# Patient Record
Sex: FEMALE | Race: WHITE | NOT HISPANIC OR LATINO
[De-identification: names, ages, dates, MRNs, and addresses within clinical notes are randomized per-mention and may not be internally consistent; named-entity substitution may affect disease eponyms.]

---

## 2017-06-20 ENCOUNTER — APPOINTMENT (OUTPATIENT)
Dept: NUCLEAR MEDICINE | Facility: CLINIC | Age: 67
End: 2017-06-20

## 2017-06-20 ENCOUNTER — APPOINTMENT (OUTPATIENT)
Dept: MRI IMAGING | Facility: CLINIC | Age: 67
End: 2017-06-20

## 2017-06-20 ENCOUNTER — OUTPATIENT (OUTPATIENT)
Dept: OUTPATIENT SERVICES | Facility: HOSPITAL | Age: 67
LOS: 1 days | End: 2017-06-20
Payer: MEDICARE

## 2017-06-20 DIAGNOSIS — Z00.8 ENCOUNTER FOR OTHER GENERAL EXAMINATION: ICD-10-CM

## 2017-06-20 PROCEDURE — 72195 MRI PELVIS W/O DYE: CPT | Mod: 26,52

## 2017-06-23 ENCOUNTER — APPOINTMENT (OUTPATIENT)
Dept: NUCLEAR MEDICINE | Facility: CLINIC | Age: 67
End: 2017-06-23

## 2017-06-23 ENCOUNTER — APPOINTMENT (OUTPATIENT)
Dept: MRI IMAGING | Facility: CLINIC | Age: 67
End: 2017-06-23

## 2017-06-23 ENCOUNTER — OUTPATIENT (OUTPATIENT)
Dept: OUTPATIENT SERVICES | Facility: HOSPITAL | Age: 67
LOS: 1 days | End: 2017-06-23

## 2017-06-23 DIAGNOSIS — Z00.8 ENCOUNTER FOR OTHER GENERAL EXAMINATION: ICD-10-CM

## 2017-06-28 ENCOUNTER — OUTPATIENT (OUTPATIENT)
Dept: OUTPATIENT SERVICES | Facility: HOSPITAL | Age: 67
LOS: 1 days | Discharge: HOME | End: 2017-06-28
Payer: MEDICARE

## 2017-06-28 DIAGNOSIS — C55 MALIGNANT NEOPLASM OF UTERUS, PART UNSPECIFIED: ICD-10-CM

## 2017-06-29 ENCOUNTER — APPOINTMENT (OUTPATIENT)
Dept: HEMATOLOGY ONCOLOGY | Facility: CLINIC | Age: 67
End: 2017-06-29

## 2017-06-29 VITALS
OXYGEN SATURATION: 94 % | TEMPERATURE: 99.1 F | BODY MASS INDEX: 29.24 KG/M2 | HEIGHT: 64.57 IN | RESPIRATION RATE: 16 BRPM | DIASTOLIC BLOOD PRESSURE: 90 MMHG | HEART RATE: 85 BPM | WEIGHT: 173.37 LBS | SYSTOLIC BLOOD PRESSURE: 160 MMHG

## 2017-06-29 DIAGNOSIS — F17.200 NICOTINE DEPENDENCE, UNSPECIFIED, UNCOMPLICATED: ICD-10-CM

## 2017-06-29 DIAGNOSIS — Z80.3 FAMILY HISTORY OF MALIGNANT NEOPLASM OF BREAST: ICD-10-CM

## 2017-06-29 DIAGNOSIS — Z78.9 OTHER SPECIFIED HEALTH STATUS: ICD-10-CM

## 2017-06-29 DIAGNOSIS — Z63.4 DISAPPEARANCE AND DEATH OF FAMILY MEMBER: ICD-10-CM

## 2017-06-29 DIAGNOSIS — Z80.1 FAMILY HISTORY OF MALIGNANT NEOPLASM OF TRACHEA, BRONCHUS AND LUNG: ICD-10-CM

## 2017-06-29 SDOH — SOCIAL STABILITY - SOCIAL INSECURITY: DISSAPEARANCE AND DEATH OF FAMILY MEMBER: Z63.4

## 2017-07-10 ENCOUNTER — APPOINTMENT (OUTPATIENT)
Dept: HEMATOLOGY ONCOLOGY | Facility: CLINIC | Age: 67
End: 2017-07-10

## 2017-07-10 ENCOUNTER — RESULT REVIEW (OUTPATIENT)
Age: 67
End: 2017-07-10

## 2017-07-10 VITALS
RESPIRATION RATE: 18 BRPM | SYSTOLIC BLOOD PRESSURE: 150 MMHG | WEIGHT: 170.4 LBS | OXYGEN SATURATION: 94 % | DIASTOLIC BLOOD PRESSURE: 80 MMHG | HEART RATE: 77 BPM | TEMPERATURE: 98.5 F | BODY MASS INDEX: 28.74 KG/M2

## 2017-07-10 LAB
BASOPHILS # BLD AUTO: 0.1 K/UL — SIGNIFICANT CHANGE UP (ref 0–0.2)
BASOPHILS NFR BLD AUTO: 1.2 % — SIGNIFICANT CHANGE UP (ref 0–2)
EOSINOPHIL # BLD AUTO: 0.4 K/UL — SIGNIFICANT CHANGE UP (ref 0–0.5)
EOSINOPHIL NFR BLD AUTO: 3.6 % — SIGNIFICANT CHANGE UP (ref 0–6)
HCT VFR BLD CALC: 43.4 % — SIGNIFICANT CHANGE UP (ref 34.5–45)
HGB BLD-MCNC: 15 G/DL — SIGNIFICANT CHANGE UP (ref 11.5–15.5)
LYMPHOCYTES # BLD AUTO: 19.6 % — SIGNIFICANT CHANGE UP (ref 13–44)
LYMPHOCYTES # BLD AUTO: 2.3 K/UL — SIGNIFICANT CHANGE UP (ref 1–3.3)
MCHC RBC-ENTMCNC: 30.3 PG — SIGNIFICANT CHANGE UP (ref 27–34)
MCHC RBC-ENTMCNC: 34.6 GM/DL — SIGNIFICANT CHANGE UP (ref 32–36)
MCV RBC AUTO: 87.4 FL — SIGNIFICANT CHANGE UP (ref 80–100)
MONOCYTES # BLD AUTO: 0.7 K/UL — SIGNIFICANT CHANGE UP (ref 0–0.9)
MONOCYTES NFR BLD AUTO: 6 % — SIGNIFICANT CHANGE UP (ref 2–14)
NEUTROPHILS # BLD AUTO: 8.2 K/UL — HIGH (ref 1.8–7.4)
NEUTROPHILS NFR BLD AUTO: 69.5 % — SIGNIFICANT CHANGE UP (ref 43–77)
PLATELET # BLD AUTO: 297 K/UL — SIGNIFICANT CHANGE UP (ref 150–400)
RBC # BLD: 4.96 M/UL — SIGNIFICANT CHANGE UP (ref 3.8–5.2)
RBC # FLD: 11.2 % — SIGNIFICANT CHANGE UP (ref 10.3–14.5)
WBC # BLD: 11.9 K/UL — HIGH (ref 3.8–10.5)
WBC # FLD AUTO: 11.9 K/UL — HIGH (ref 3.8–10.5)

## 2017-07-10 RX ORDER — DIAZEPAM 5 MG/1
TABLET ORAL
Refills: 0 | Status: DISCONTINUED | COMMUNITY

## 2017-07-11 ENCOUNTER — CLINICAL ADVICE (OUTPATIENT)
Age: 67
End: 2017-07-11

## 2017-07-11 ENCOUNTER — APPOINTMENT (OUTPATIENT)
Dept: INFUSION THERAPY | Facility: CLINIC | Age: 67
End: 2017-07-11

## 2017-07-11 LAB
ALBUMIN SERPL ELPH-MCNC: 4.9 G/DL
ALP BLD-CCNC: 68 U/L
ALT SERPL-CCNC: 16 U/L
ANION GAP SERPL CALC-SCNC: 21 MMOL/L
AST SERPL-CCNC: 19 U/L
BILIRUB SERPL-MCNC: 0.5 MG/DL
BUN SERPL-MCNC: 21 MG/DL
CALCIUM SERPL-MCNC: 9.6 MG/DL
CHLORIDE SERPL-SCNC: 102 MMOL/L
CO2 SERPL-SCNC: 21 MMOL/L
CREAT SERPL-MCNC: 0.97 MG/DL
GLUCOSE SERPL-MCNC: 114 MG/DL
HBV CORE IGM SER QL: NONREACTIVE
HBV SURFACE AB SER QL: NONREACTIVE
HBV SURFACE AB SERPL IA-ACNC: <3 MIU/ML
HBV SURFACE AG SER QL: NONREACTIVE
HCV AB SER QL: NONREACTIVE
HCV S/CO RATIO: 0.11 S/CO
MAGNESIUM SERPL-MCNC: 2.3 MG/DL
POTASSIUM SERPL-SCNC: 4.2 MMOL/L
PROT SERPL-MCNC: 7.7 G/DL
SODIUM SERPL-SCNC: 144 MMOL/L

## 2017-07-11 PROCEDURE — 93010 ELECTROCARDIOGRAM REPORT: CPT

## 2017-07-12 DIAGNOSIS — Z51.11 ENCOUNTER FOR ANTINEOPLASTIC CHEMOTHERAPY: ICD-10-CM

## 2017-07-12 DIAGNOSIS — C54.1 MALIGNANT NEOPLASM OF ENDOMETRIUM: ICD-10-CM

## 2017-07-12 DIAGNOSIS — R11.2 NAUSEA WITH VOMITING, UNSPECIFIED: ICD-10-CM

## 2017-07-17 ENCOUNTER — OTHER (OUTPATIENT)
Age: 67
End: 2017-07-17

## 2017-07-20 ENCOUNTER — RESULT REVIEW (OUTPATIENT)
Age: 67
End: 2017-07-20

## 2017-07-20 ENCOUNTER — APPOINTMENT (OUTPATIENT)
Dept: HEMATOLOGY ONCOLOGY | Facility: CLINIC | Age: 67
End: 2017-07-20

## 2017-07-20 VITALS
HEART RATE: 68 BPM | DIASTOLIC BLOOD PRESSURE: 75 MMHG | WEIGHT: 167.99 LBS | BODY MASS INDEX: 28.33 KG/M2 | RESPIRATION RATE: 16 BRPM | OXYGEN SATURATION: 98 % | SYSTOLIC BLOOD PRESSURE: 132 MMHG | TEMPERATURE: 98.5 F

## 2017-07-20 LAB
BASOPHILS # BLD AUTO: 0 K/UL — SIGNIFICANT CHANGE UP (ref 0–0.2)
BASOPHILS NFR BLD AUTO: 0.8 % — SIGNIFICANT CHANGE UP (ref 0–2)
EOSINOPHIL # BLD AUTO: 0.3 K/UL — SIGNIFICANT CHANGE UP (ref 0–0.5)
EOSINOPHIL NFR BLD AUTO: 5.1 % — SIGNIFICANT CHANGE UP (ref 0–6)
HCT VFR BLD CALC: 39.3 % — SIGNIFICANT CHANGE UP (ref 34.5–45)
HGB BLD-MCNC: 13.7 G/DL — SIGNIFICANT CHANGE UP (ref 11.5–15.5)
LYMPHOCYTES # BLD AUTO: 1.5 K/UL — SIGNIFICANT CHANGE UP (ref 1–3.3)
LYMPHOCYTES # BLD AUTO: 29 % — SIGNIFICANT CHANGE UP (ref 13–44)
MCHC RBC-ENTMCNC: 30.2 PG — SIGNIFICANT CHANGE UP (ref 27–34)
MCHC RBC-ENTMCNC: 34.9 GM/DL — SIGNIFICANT CHANGE UP (ref 32–36)
MCV RBC AUTO: 86.4 FL — SIGNIFICANT CHANGE UP (ref 80–100)
MONOCYTES # BLD AUTO: 0.4 K/UL — SIGNIFICANT CHANGE UP (ref 0–0.9)
MONOCYTES NFR BLD AUTO: 8 % — SIGNIFICANT CHANGE UP (ref 2–14)
NEUTROPHILS # BLD AUTO: 3 K/UL — SIGNIFICANT CHANGE UP (ref 1.8–7.4)
NEUTROPHILS NFR BLD AUTO: 57 % — SIGNIFICANT CHANGE UP (ref 43–77)
PLATELET # BLD AUTO: 246 K/UL — SIGNIFICANT CHANGE UP (ref 150–400)
RBC # BLD: 4.54 M/UL — SIGNIFICANT CHANGE UP (ref 3.8–5.2)
RBC # FLD: 10.7 % — SIGNIFICANT CHANGE UP (ref 10.3–14.5)
WBC # BLD: 5.3 K/UL — SIGNIFICANT CHANGE UP (ref 3.8–10.5)
WBC # FLD AUTO: 5.3 K/UL — SIGNIFICANT CHANGE UP (ref 3.8–10.5)

## 2017-07-20 RX ORDER — AZITHROMYCIN 250 MG/1
250 TABLET, FILM COATED ORAL
Qty: 6 | Refills: 0 | Status: DISCONTINUED | COMMUNITY
Start: 2017-05-20

## 2017-07-20 RX ORDER — ROSUVASTATIN CALCIUM 5 MG/1
TABLET, FILM COATED ORAL
Refills: 0 | Status: DISCONTINUED | COMMUNITY
End: 2017-07-20

## 2017-07-21 LAB
ALBUMIN SERPL ELPH-MCNC: 4.6 G/DL
ALP BLD-CCNC: 71 U/L
ALT SERPL-CCNC: 11 U/L
ANION GAP SERPL CALC-SCNC: 23 MMOL/L
AST SERPL-CCNC: 15 U/L
BILIRUB SERPL-MCNC: 0.5 MG/DL
BUN SERPL-MCNC: 36 MG/DL
CALCIUM SERPL-MCNC: 10.3 MG/DL
CHLORIDE SERPL-SCNC: 106 MMOL/L
CO2 SERPL-SCNC: 20 MMOL/L
CREAT SERPL-MCNC: 1.59 MG/DL
MAGNESIUM SERPL-MCNC: 2 MG/DL
POTASSIUM SERPL-SCNC: 4.2 MMOL/L
PROT SERPL-MCNC: 7.3 G/DL
SODIUM SERPL-SCNC: 149 MMOL/L

## 2017-07-28 ENCOUNTER — OUTPATIENT (OUTPATIENT)
Dept: OUTPATIENT SERVICES | Facility: HOSPITAL | Age: 67
LOS: 1 days | Discharge: HOME | End: 2017-07-28

## 2017-07-28 DIAGNOSIS — C55 MALIGNANT NEOPLASM OF UTERUS, PART UNSPECIFIED: ICD-10-CM

## 2017-08-01 ENCOUNTER — LABORATORY RESULT (OUTPATIENT)
Age: 67
End: 2017-08-01

## 2017-08-01 ENCOUNTER — RESULT REVIEW (OUTPATIENT)
Age: 67
End: 2017-08-01

## 2017-08-01 ENCOUNTER — APPOINTMENT (OUTPATIENT)
Dept: INFUSION THERAPY | Facility: CLINIC | Age: 67
End: 2017-08-01

## 2017-08-01 ENCOUNTER — APPOINTMENT (OUTPATIENT)
Dept: HEMATOLOGY ONCOLOGY | Facility: CLINIC | Age: 67
End: 2017-08-01
Payer: MEDICARE

## 2017-08-01 VITALS
DIASTOLIC BLOOD PRESSURE: 85 MMHG | TEMPERATURE: 98.5 F | SYSTOLIC BLOOD PRESSURE: 145 MMHG | BODY MASS INDEX: 27.96 KG/M2 | WEIGHT: 165.77 LBS | RESPIRATION RATE: 16 BRPM | OXYGEN SATURATION: 95 % | HEART RATE: 97 BPM

## 2017-08-01 LAB
BASOPHILS # BLD AUTO: 0.1 K/UL — SIGNIFICANT CHANGE UP (ref 0–0.2)
BASOPHILS NFR BLD AUTO: 0.4 % — SIGNIFICANT CHANGE UP (ref 0–2)
BUN SERPL-MCNC: 26 MG/DL — HIGH (ref 7–23)
CA-I BLDA-SCNC: 1.18 MMOL/L — SIGNIFICANT CHANGE UP (ref 1.12–1.3)
CHLORIDE SERPL-SCNC: 103 MMOL/L — SIGNIFICANT CHANGE UP (ref 96–108)
CO2 SERPL-SCNC: 25 MMOL/L — SIGNIFICANT CHANGE UP (ref 22–31)
CREAT SERPL-MCNC: 0.8 MG/DL — SIGNIFICANT CHANGE UP (ref 0.5–1.3)
EOSINOPHIL # BLD AUTO: 0 K/UL — SIGNIFICANT CHANGE UP (ref 0–0.5)
EOSINOPHIL NFR BLD AUTO: 0 % — SIGNIFICANT CHANGE UP (ref 0–6)
GLUCOSE SERPL-MCNC: 199 MG/DL — HIGH (ref 70–99)
HCT VFR BLD CALC: 40.5 % — SIGNIFICANT CHANGE UP (ref 34.5–45)
HGB BLD-MCNC: 14.9 G/DL — SIGNIFICANT CHANGE UP (ref 11.5–15.5)
LYMPHOCYTES # BLD AUTO: 1 K/UL — SIGNIFICANT CHANGE UP (ref 1–3.3)
LYMPHOCYTES # BLD AUTO: 6.1 % — LOW (ref 13–44)
MCHC RBC-ENTMCNC: 32.5 PG — SIGNIFICANT CHANGE UP (ref 27–34)
MCHC RBC-ENTMCNC: 36.8 GM/DL — HIGH (ref 32–36)
MCV RBC AUTO: 88.4 FL — SIGNIFICANT CHANGE UP (ref 80–100)
MONOCYTES # BLD AUTO: 0.2 K/UL — SIGNIFICANT CHANGE UP (ref 0–0.9)
MONOCYTES NFR BLD AUTO: 1.2 % — LOW (ref 2–14)
NEUTROPHILS # BLD AUTO: 15.2 K/UL — HIGH (ref 1.8–7.4)
NEUTROPHILS NFR BLD AUTO: 92.3 % — HIGH (ref 43–77)
PLATELET # BLD AUTO: 294 K/UL — SIGNIFICANT CHANGE UP (ref 150–400)
POTASSIUM SERPL-MCNC: 3.9 MMOL/L — SIGNIFICANT CHANGE UP (ref 3.5–5.3)
POTASSIUM SERPL-SCNC: 3.9 MMOL/L — SIGNIFICANT CHANGE UP (ref 3.5–5.3)
RBC # BLD: 4.58 M/UL — SIGNIFICANT CHANGE UP (ref 3.8–5.2)
RBC # FLD: 11.7 % — SIGNIFICANT CHANGE UP (ref 10.3–14.5)
SODIUM SERPL-SCNC: 141 MMOL/L — SIGNIFICANT CHANGE UP (ref 135–145)
WBC # BLD: 16.5 K/UL — HIGH (ref 3.8–10.5)
WBC # FLD AUTO: 16.5 K/UL — HIGH (ref 3.8–10.5)

## 2017-08-01 PROCEDURE — 99214 OFFICE O/P EST MOD 30 MIN: CPT

## 2017-08-02 DIAGNOSIS — C54.1 MALIGNANT NEOPLASM OF ENDOMETRIUM: ICD-10-CM

## 2017-08-02 DIAGNOSIS — R11.2 NAUSEA WITH VOMITING, UNSPECIFIED: ICD-10-CM

## 2017-08-02 DIAGNOSIS — Z51.11 ENCOUNTER FOR ANTINEOPLASTIC CHEMOTHERAPY: ICD-10-CM

## 2017-08-21 ENCOUNTER — OUTPATIENT (OUTPATIENT)
Dept: OUTPATIENT SERVICES | Facility: HOSPITAL | Age: 67
LOS: 1 days | End: 2017-08-21

## 2017-08-21 ENCOUNTER — APPOINTMENT (OUTPATIENT)
Dept: NUCLEAR MEDICINE | Facility: CLINIC | Age: 67
End: 2017-08-21
Payer: MEDICARE

## 2017-08-21 DIAGNOSIS — Z00.8 ENCOUNTER FOR OTHER GENERAL EXAMINATION: ICD-10-CM

## 2017-08-21 PROCEDURE — 78815 PET IMAGE W/CT SKULL-THIGH: CPT | Mod: 26,PS

## 2017-08-22 ENCOUNTER — APPOINTMENT (OUTPATIENT)
Dept: HEMATOLOGY ONCOLOGY | Facility: CLINIC | Age: 67
End: 2017-08-22
Payer: MEDICARE

## 2017-08-22 VITALS
HEART RATE: 97 BPM | DIASTOLIC BLOOD PRESSURE: 73 MMHG | WEIGHT: 163.14 LBS | BODY MASS INDEX: 27.51 KG/M2 | SYSTOLIC BLOOD PRESSURE: 122 MMHG | RESPIRATION RATE: 16 BRPM | TEMPERATURE: 99 F | OXYGEN SATURATION: 95 %

## 2017-08-22 PROCEDURE — 99214 OFFICE O/P EST MOD 30 MIN: CPT

## 2017-08-28 ENCOUNTER — OUTPATIENT (OUTPATIENT)
Dept: OUTPATIENT SERVICES | Facility: HOSPITAL | Age: 67
LOS: 1 days | Discharge: HOME | End: 2017-08-28

## 2017-08-28 DIAGNOSIS — C55 MALIGNANT NEOPLASM OF UTERUS, PART UNSPECIFIED: ICD-10-CM

## 2017-08-31 ENCOUNTER — RESULT REVIEW (OUTPATIENT)
Age: 67
End: 2017-08-31

## 2017-08-31 ENCOUNTER — LABORATORY RESULT (OUTPATIENT)
Age: 67
End: 2017-08-31

## 2017-08-31 ENCOUNTER — APPOINTMENT (OUTPATIENT)
Dept: INFUSION THERAPY | Facility: CLINIC | Age: 67
End: 2017-08-31

## 2017-08-31 LAB
ANISOCYTOSIS BLD QL: SLIGHT — SIGNIFICANT CHANGE UP
BASO STIPL BLD QL SMEAR: PRESENT — SIGNIFICANT CHANGE UP
BASOPHILS # BLD AUTO: 0 K/UL — SIGNIFICANT CHANGE UP (ref 0–0.2)
EOSINOPHIL # BLD AUTO: 0 K/UL — SIGNIFICANT CHANGE UP (ref 0–0.5)
HCT VFR BLD CALC: 33.5 % — LOW (ref 34.5–45)
HGB BLD-MCNC: 12.8 G/DL — SIGNIFICANT CHANGE UP (ref 11.5–15.5)
LYMPHOCYTES # BLD AUTO: 0.5 K/UL — LOW (ref 1–3.3)
LYMPHOCYTES # BLD AUTO: 9 % — LOW (ref 13–44)
MCHC RBC-ENTMCNC: 34.1 PG — HIGH (ref 27–34)
MCHC RBC-ENTMCNC: 38.2 GM/DL — HIGH (ref 32–36)
MCV RBC AUTO: 89.3 FL — SIGNIFICANT CHANGE UP (ref 80–100)
MONOCYTES # BLD AUTO: 0.1 K/UL — SIGNIFICANT CHANGE UP (ref 0–0.9)
MONOCYTES NFR BLD AUTO: 2 % — SIGNIFICANT CHANGE UP (ref 2–14)
NEUTROPHILS # BLD AUTO: 7.1 K/UL — SIGNIFICANT CHANGE UP (ref 1.8–7.4)
NEUTROPHILS NFR BLD AUTO: 89 % — HIGH (ref 43–77)
NEUTS HYPERSEG # BLD: PRESENT — SIGNIFICANT CHANGE UP
PLAT MORPH BLD: NORMAL — SIGNIFICANT CHANGE UP
PLATELET # BLD AUTO: 411 K/UL — HIGH (ref 150–400)
POLYCHROMASIA BLD QL SMEAR: SLIGHT — SIGNIFICANT CHANGE UP
RBC # BLD: 3.75 M/UL — LOW (ref 3.8–5.2)
RBC # FLD: 16.5 % — HIGH (ref 10.3–14.5)
RBC BLD AUTO: NORMAL — SIGNIFICANT CHANGE UP
SPHEROCYTES BLD QL SMEAR: SLIGHT — SIGNIFICANT CHANGE UP
WBC # BLD: 7.6 K/UL — SIGNIFICANT CHANGE UP (ref 3.8–10.5)
WBC # FLD AUTO: 7.6 K/UL — SIGNIFICANT CHANGE UP (ref 3.8–10.5)

## 2017-09-01 DIAGNOSIS — R11.2 NAUSEA WITH VOMITING, UNSPECIFIED: ICD-10-CM

## 2017-09-01 DIAGNOSIS — C54.1 MALIGNANT NEOPLASM OF ENDOMETRIUM: ICD-10-CM

## 2017-09-01 DIAGNOSIS — Z51.11 ENCOUNTER FOR ANTINEOPLASTIC CHEMOTHERAPY: ICD-10-CM

## 2017-09-05 ENCOUNTER — APPOINTMENT (OUTPATIENT)
Dept: HEMATOLOGY ONCOLOGY | Facility: CLINIC | Age: 67
End: 2017-09-05
Payer: MEDICARE

## 2017-09-05 VITALS
TEMPERATURE: 98.9 F | WEIGHT: 161.03 LBS | OXYGEN SATURATION: 97 % | RESPIRATION RATE: 14 BRPM | HEART RATE: 99 BPM | SYSTOLIC BLOOD PRESSURE: 134 MMHG | DIASTOLIC BLOOD PRESSURE: 81 MMHG | BODY MASS INDEX: 27.16 KG/M2

## 2017-09-05 PROCEDURE — 99214 OFFICE O/P EST MOD 30 MIN: CPT

## 2017-09-21 ENCOUNTER — APPOINTMENT (OUTPATIENT)
Dept: INFUSION THERAPY | Facility: CLINIC | Age: 67
End: 2017-09-21

## 2017-09-21 ENCOUNTER — APPOINTMENT (OUTPATIENT)
Dept: HEMATOLOGY ONCOLOGY | Facility: CLINIC | Age: 67
End: 2017-09-21
Payer: MEDICARE

## 2017-09-21 ENCOUNTER — LABORATORY RESULT (OUTPATIENT)
Age: 67
End: 2017-09-21

## 2017-09-21 ENCOUNTER — RESULT REVIEW (OUTPATIENT)
Age: 67
End: 2017-09-21

## 2017-09-21 LAB
BASOPHILS # BLD AUTO: 0 K/UL — SIGNIFICANT CHANGE UP (ref 0–0.2)
BASOPHILS NFR BLD AUTO: 0.4 % — SIGNIFICANT CHANGE UP (ref 0–2)
EOSINOPHIL # BLD AUTO: 0 K/UL — SIGNIFICANT CHANGE UP (ref 0–0.5)
EOSINOPHIL NFR BLD AUTO: 0.1 % — SIGNIFICANT CHANGE UP (ref 0–6)
HCT VFR BLD CALC: 24.8 % — LOW (ref 34.5–45)
HGB BLD-MCNC: 9.7 G/DL — LOW (ref 11.5–15.5)
LYMPHOCYTES # BLD AUTO: 0.5 K/UL — LOW (ref 1–3.3)
LYMPHOCYTES # BLD AUTO: 4.7 % — LOW (ref 13–44)
MCHC RBC-ENTMCNC: 35.8 PG — HIGH (ref 27–34)
MCHC RBC-ENTMCNC: 39.3 GM/DL — HIGH (ref 32–36)
MCV RBC AUTO: 91.1 FL — SIGNIFICANT CHANGE UP (ref 80–100)
MONOCYTES # BLD AUTO: 0.1 K/UL — SIGNIFICANT CHANGE UP (ref 0–0.9)
MONOCYTES NFR BLD AUTO: 1 % — LOW (ref 2–14)
NEUTROPHILS # BLD AUTO: 9.8 K/UL — HIGH (ref 1.8–7.4)
NEUTROPHILS NFR BLD AUTO: 93.8 % — HIGH (ref 43–77)
PLATELET # BLD AUTO: 181 K/UL — SIGNIFICANT CHANGE UP (ref 150–400)
RBC # BLD: 2.72 M/UL — LOW (ref 3.8–5.2)
RBC # FLD: 17.5 % — HIGH (ref 10.3–14.5)
WBC # BLD: 10.5 K/UL — SIGNIFICANT CHANGE UP (ref 3.8–10.5)
WBC # FLD AUTO: 10.5 K/UL — SIGNIFICANT CHANGE UP (ref 3.8–10.5)

## 2017-09-21 PROCEDURE — 99214 OFFICE O/P EST MOD 30 MIN: CPT

## 2017-10-09 ENCOUNTER — OUTPATIENT (OUTPATIENT)
Dept: OUTPATIENT SERVICES | Facility: HOSPITAL | Age: 67
LOS: 1 days | Discharge: HOME | End: 2017-10-09

## 2017-10-09 DIAGNOSIS — C55 MALIGNANT NEOPLASM OF UTERUS, PART UNSPECIFIED: ICD-10-CM

## 2017-10-12 ENCOUNTER — OUTPATIENT (OUTPATIENT)
Dept: OUTPATIENT SERVICES | Facility: HOSPITAL | Age: 67
LOS: 1 days | End: 2017-10-12
Payer: MEDICARE

## 2017-10-12 ENCOUNTER — LABORATORY RESULT (OUTPATIENT)
Age: 67
End: 2017-10-12

## 2017-10-12 ENCOUNTER — RESULT REVIEW (OUTPATIENT)
Age: 67
End: 2017-10-12

## 2017-10-12 ENCOUNTER — APPOINTMENT (OUTPATIENT)
Dept: HEMATOLOGY ONCOLOGY | Facility: CLINIC | Age: 67
End: 2017-10-12
Payer: MEDICARE

## 2017-10-12 ENCOUNTER — APPOINTMENT (OUTPATIENT)
Dept: INFUSION THERAPY | Facility: CLINIC | Age: 67
End: 2017-10-12

## 2017-10-12 DIAGNOSIS — D64.89 OTHER SPECIFIED ANEMIAS: ICD-10-CM

## 2017-10-12 LAB
ALLERGY+IMMUNOLOGY DIAG STUDY NOTE: SIGNIFICANT CHANGE UP
BASOPHILS # BLD AUTO: 0 K/UL — SIGNIFICANT CHANGE UP (ref 0–0.2)
BASOPHILS NFR BLD AUTO: 0.1 % — SIGNIFICANT CHANGE UP (ref 0–2)
BLD GP AB SCN SERPL QL: SIGNIFICANT CHANGE UP
DIR ANTIGLOB POLYSPECIFIC INTERPRETATION: SIGNIFICANT CHANGE UP
EOSINOPHIL # BLD AUTO: 0 K/UL — SIGNIFICANT CHANGE UP (ref 0–0.5)
EOSINOPHIL NFR BLD AUTO: 0.2 % — SIGNIFICANT CHANGE UP (ref 0–6)
HCT VFR BLD CALC: 21 % — CRITICAL LOW (ref 34.5–45)
HGB BLD-MCNC: 8.1 G/DL — LOW (ref 11.5–15.5)
LYMPHOCYTES # BLD AUTO: 0.6 K/UL — LOW (ref 1–3.3)
LYMPHOCYTES # BLD AUTO: 8.8 % — LOW (ref 13–44)
MCHC RBC-ENTMCNC: 37.2 PG — HIGH (ref 27–34)
MCHC RBC-ENTMCNC: 38.6 GM/DL — HIGH (ref 32–36)
MCV RBC AUTO: 96.5 FL — SIGNIFICANT CHANGE UP (ref 80–100)
MONOCYTES # BLD AUTO: 0.1 K/UL — SIGNIFICANT CHANGE UP (ref 0–0.9)
MONOCYTES NFR BLD AUTO: 1 % — LOW (ref 2–14)
NEUTROPHILS # BLD AUTO: 6.5 K/UL — SIGNIFICANT CHANGE UP (ref 1.8–7.4)
NEUTROPHILS NFR BLD AUTO: 89.9 % — HIGH (ref 43–77)
PLATELET # BLD AUTO: 121 K/UL — LOW (ref 150–400)
RBC # BLD: 2.18 M/UL — LOW (ref 3.8–5.2)
RBC # FLD: 19.2 % — HIGH (ref 10.3–14.5)
TYPE + AB SCN PNL BLD: SIGNIFICANT CHANGE UP
WBC # BLD: 7.2 K/UL — SIGNIFICANT CHANGE UP (ref 3.8–10.5)
WBC # FLD AUTO: 7.2 K/UL — SIGNIFICANT CHANGE UP (ref 3.8–10.5)

## 2017-10-12 PROCEDURE — 86900 BLOOD TYPING SEROLOGIC ABO: CPT

## 2017-10-12 PROCEDURE — 99214 OFFICE O/P EST MOD 30 MIN: CPT

## 2017-10-12 PROCEDURE — 86901 BLOOD TYPING SEROLOGIC RH(D): CPT

## 2017-10-12 PROCEDURE — 86850 RBC ANTIBODY SCREEN: CPT

## 2017-10-12 PROCEDURE — 86870 RBC ANTIBODY IDENTIFICATION: CPT

## 2017-10-12 PROCEDURE — 86077 PHYS BLOOD BANK SERV XMATCH: CPT

## 2017-10-12 PROCEDURE — 86922 COMPATIBILITY TEST ANTIGLOB: CPT

## 2017-10-12 PROCEDURE — 86880 COOMBS TEST DIRECT: CPT

## 2017-10-12 RX ORDER — ACETAMINOPHEN 500 MG
650 TABLET ORAL ONCE
Qty: 0 | Refills: 0 | Status: COMPLETED | OUTPATIENT
Start: 2017-10-13 | End: 2017-10-13

## 2017-10-13 ENCOUNTER — OUTPATIENT (OUTPATIENT)
Dept: INPATIENT UNIT | Facility: HOSPITAL | Age: 67
LOS: 1 days | End: 2017-10-13
Payer: MEDICARE

## 2017-10-13 VITALS
OXYGEN SATURATION: 99 % | HEART RATE: 78 BPM | TEMPERATURE: 99 F | SYSTOLIC BLOOD PRESSURE: 112 MMHG | DIASTOLIC BLOOD PRESSURE: 80 MMHG | RESPIRATION RATE: 18 BRPM

## 2017-10-13 VITALS — TEMPERATURE: 99 F

## 2017-10-13 DIAGNOSIS — D64.9 ANEMIA, UNSPECIFIED: ICD-10-CM

## 2017-10-13 DIAGNOSIS — Z51.11 ENCOUNTER FOR ANTINEOPLASTIC CHEMOTHERAPY: ICD-10-CM

## 2017-10-13 DIAGNOSIS — R11.2 NAUSEA WITH VOMITING, UNSPECIFIED: ICD-10-CM

## 2017-10-13 PROCEDURE — 36430 TRANSFUSION BLD/BLD COMPNT: CPT

## 2017-10-13 PROCEDURE — P9016: CPT

## 2017-10-13 RX ADMIN — Medication 650 MILLIGRAM(S): at 10:35

## 2017-10-19 ENCOUNTER — APPOINTMENT (OUTPATIENT)
Dept: INFUSION THERAPY | Facility: CLINIC | Age: 67
End: 2017-10-19

## 2017-11-02 ENCOUNTER — LABORATORY RESULT (OUTPATIENT)
Age: 67
End: 2017-11-02

## 2017-11-02 ENCOUNTER — APPOINTMENT (OUTPATIENT)
Dept: INFUSION THERAPY | Facility: CLINIC | Age: 67
End: 2017-11-02

## 2017-11-02 ENCOUNTER — RESULT REVIEW (OUTPATIENT)
Age: 67
End: 2017-11-02

## 2017-11-02 LAB
BASOPHILS # BLD AUTO: 0 K/UL — SIGNIFICANT CHANGE UP (ref 0–0.2)
BASOPHILS NFR BLD AUTO: 0.1 % — SIGNIFICANT CHANGE UP (ref 0–2)
EOSINOPHIL # BLD AUTO: 0 K/UL — SIGNIFICANT CHANGE UP (ref 0–0.5)
EOSINOPHIL NFR BLD AUTO: 0.3 % — SIGNIFICANT CHANGE UP (ref 0–6)
HCT VFR BLD CALC: 22.4 % — LOW (ref 34.5–45)
HGB BLD-MCNC: 8.5 G/DL — LOW (ref 11.5–15.5)
LYMPHOCYTES # BLD AUTO: 0.5 K/UL — LOW (ref 1–3.3)
LYMPHOCYTES # BLD AUTO: 6.4 % — LOW (ref 13–44)
MCHC RBC-ENTMCNC: 37.8 PG — HIGH (ref 27–34)
MCHC RBC-ENTMCNC: 38 GM/DL — HIGH (ref 32–36)
MCV RBC AUTO: 99.5 FL — SIGNIFICANT CHANGE UP (ref 80–100)
MONOCYTES # BLD AUTO: 0.1 K/UL — SIGNIFICANT CHANGE UP (ref 0–0.9)
MONOCYTES NFR BLD AUTO: 0.9 % — LOW (ref 2–14)
NEUTROPHILS # BLD AUTO: 7.6 K/UL — HIGH (ref 1.8–7.4)
NEUTROPHILS NFR BLD AUTO: 92.3 % — HIGH (ref 43–77)
PLATELET # BLD AUTO: 113 K/UL — LOW (ref 150–400)
RBC # BLD: 2.25 M/UL — LOW (ref 3.8–5.2)
RBC # FLD: 18 % — HIGH (ref 10.3–14.5)
WBC # BLD: 8.2 K/UL — SIGNIFICANT CHANGE UP (ref 3.8–10.5)
WBC # FLD AUTO: 8.2 K/UL — SIGNIFICANT CHANGE UP (ref 3.8–10.5)

## 2017-11-03 DIAGNOSIS — C54.1 MALIGNANT NEOPLASM OF ENDOMETRIUM: ICD-10-CM

## 2017-11-07 ENCOUNTER — RESULT REVIEW (OUTPATIENT)
Age: 67
End: 2017-11-07

## 2017-11-07 ENCOUNTER — OUTPATIENT (OUTPATIENT)
Dept: OUTPATIENT SERVICES | Facility: HOSPITAL | Age: 67
LOS: 1 days | End: 2017-11-07
Payer: MEDICARE

## 2017-11-07 ENCOUNTER — APPOINTMENT (OUTPATIENT)
Dept: HEMATOLOGY ONCOLOGY | Facility: CLINIC | Age: 67
End: 2017-11-07
Payer: MEDICARE

## 2017-11-07 VITALS
TEMPERATURE: 98.4 F | BODY MASS INDEX: 25.09 KG/M2 | HEART RATE: 98 BPM | OXYGEN SATURATION: 100 % | DIASTOLIC BLOOD PRESSURE: 58 MMHG | SYSTOLIC BLOOD PRESSURE: 101 MMHG | WEIGHT: 148.81 LBS

## 2017-11-07 DIAGNOSIS — D64.9 ANEMIA, UNSPECIFIED: ICD-10-CM

## 2017-11-07 LAB
BASOPHILS # BLD AUTO: 0 K/UL — SIGNIFICANT CHANGE UP (ref 0–0.2)
BASOPHILS NFR BLD AUTO: 0.5 % — SIGNIFICANT CHANGE UP (ref 0–2)
BLD GP AB SCN SERPL QL: SIGNIFICANT CHANGE UP
EOSINOPHIL # BLD AUTO: 0.1 K/UL — SIGNIFICANT CHANGE UP (ref 0–0.5)
EOSINOPHIL NFR BLD AUTO: 1.5 % — SIGNIFICANT CHANGE UP (ref 0–6)
HCT VFR BLD CALC: 20 % — CRITICAL LOW (ref 34.5–45)
HGB BLD-MCNC: 7.6 G/DL — LOW (ref 11.5–15.5)
LYMPHOCYTES # BLD AUTO: 1.5 K/UL — SIGNIFICANT CHANGE UP (ref 1–3.3)
LYMPHOCYTES # BLD AUTO: 34.1 % — SIGNIFICANT CHANGE UP (ref 13–44)
MCHC RBC-ENTMCNC: 37.1 PG — HIGH (ref 27–34)
MCHC RBC-ENTMCNC: 38.2 GM/DL — HIGH (ref 32–36)
MCV RBC AUTO: 97.2 FL — SIGNIFICANT CHANGE UP (ref 80–100)
MONOCYTES # BLD AUTO: 0.1 K/UL — SIGNIFICANT CHANGE UP (ref 0–0.9)
MONOCYTES NFR BLD AUTO: 1.5 % — LOW (ref 2–14)
NEUTROPHILS # BLD AUTO: 2.8 K/UL — SIGNIFICANT CHANGE UP (ref 1.8–7.4)
NEUTROPHILS NFR BLD AUTO: 62.4 % — SIGNIFICANT CHANGE UP (ref 43–77)
PLATELET # BLD AUTO: 74 K/UL — LOW (ref 150–400)
RBC # BLD: 2.05 M/UL — LOW (ref 3.8–5.2)
RBC # FLD: 15.2 % — HIGH (ref 10.3–14.5)
TYPE + AB SCN PNL BLD: SIGNIFICANT CHANGE UP
WBC # BLD: 4.5 K/UL — SIGNIFICANT CHANGE UP (ref 3.8–10.5)
WBC # FLD AUTO: 4.5 K/UL — SIGNIFICANT CHANGE UP (ref 3.8–10.5)

## 2017-11-07 PROCEDURE — 99214 OFFICE O/P EST MOD 30 MIN: CPT

## 2017-11-09 ENCOUNTER — OUTPATIENT (OUTPATIENT)
Dept: OUTPATIENT SERVICES | Facility: HOSPITAL | Age: 67
LOS: 1 days | End: 2017-11-09
Payer: MEDICARE

## 2017-11-09 VITALS
DIASTOLIC BLOOD PRESSURE: 67 MMHG | HEART RATE: 75 BPM | TEMPERATURE: 98 F | SYSTOLIC BLOOD PRESSURE: 118 MMHG | OXYGEN SATURATION: 93 % | RESPIRATION RATE: 20 BRPM

## 2017-11-09 VITALS
DIASTOLIC BLOOD PRESSURE: 77 MMHG | RESPIRATION RATE: 18 BRPM | HEART RATE: 72 BPM | TEMPERATURE: 99 F | OXYGEN SATURATION: 95 % | SYSTOLIC BLOOD PRESSURE: 118 MMHG

## 2017-11-09 DIAGNOSIS — D64.9 ANEMIA, UNSPECIFIED: ICD-10-CM

## 2017-11-09 PROCEDURE — 36415 COLL VENOUS BLD VENIPUNCTURE: CPT

## 2017-11-09 PROCEDURE — 86922 COMPATIBILITY TEST ANTIGLOB: CPT

## 2017-11-09 PROCEDURE — 86850 RBC ANTIBODY SCREEN: CPT

## 2017-11-09 PROCEDURE — 36430 TRANSFUSION BLD/BLD COMPNT: CPT

## 2017-11-09 PROCEDURE — 86900 BLOOD TYPING SEROLOGIC ABO: CPT

## 2017-11-09 PROCEDURE — 86901 BLOOD TYPING SEROLOGIC RH(D): CPT

## 2017-11-09 PROCEDURE — P9016: CPT

## 2017-11-09 RX ORDER — ACETAMINOPHEN 500 MG
650 TABLET ORAL ONCE
Qty: 0 | Refills: 0 | Status: COMPLETED | OUTPATIENT
Start: 2017-11-09 | End: 2017-11-09

## 2017-11-09 RX ADMIN — Medication 650 MILLIGRAM(S): at 11:39

## 2017-11-13 ENCOUNTER — FORM ENCOUNTER (OUTPATIENT)
Age: 67
End: 2017-11-13

## 2017-11-14 ENCOUNTER — APPOINTMENT (OUTPATIENT)
Dept: NUCLEAR MEDICINE | Facility: CLINIC | Age: 67
End: 2017-11-14
Payer: MEDICARE

## 2017-11-14 ENCOUNTER — OUTPATIENT (OUTPATIENT)
Dept: OUTPATIENT SERVICES | Facility: HOSPITAL | Age: 67
LOS: 1 days | End: 2017-11-14

## 2017-11-14 PROCEDURE — 78815 PET IMAGE W/CT SKULL-THIGH: CPT | Mod: 26,PS

## 2017-11-16 ENCOUNTER — OUTPATIENT (OUTPATIENT)
Dept: OUTPATIENT SERVICES | Facility: HOSPITAL | Age: 67
LOS: 1 days | Discharge: HOME | End: 2017-11-16

## 2017-11-16 DIAGNOSIS — C55 MALIGNANT NEOPLASM OF UTERUS, PART UNSPECIFIED: ICD-10-CM

## 2017-11-21 ENCOUNTER — RESULT REVIEW (OUTPATIENT)
Age: 67
End: 2017-11-21

## 2017-11-21 ENCOUNTER — APPOINTMENT (OUTPATIENT)
Dept: HEMATOLOGY ONCOLOGY | Facility: CLINIC | Age: 67
End: 2017-11-21
Payer: MEDICARE

## 2017-11-21 ENCOUNTER — APPOINTMENT (OUTPATIENT)
Dept: NUCLEAR MEDICINE | Facility: CLINIC | Age: 67
End: 2017-11-21

## 2017-11-21 VITALS
TEMPERATURE: 98.4 F | OXYGEN SATURATION: 97 % | WEIGHT: 155.21 LBS | HEART RATE: 92 BPM | DIASTOLIC BLOOD PRESSURE: 75 MMHG | SYSTOLIC BLOOD PRESSURE: 132 MMHG | BODY MASS INDEX: 26.17 KG/M2

## 2017-11-21 LAB
ANISOCYTOSIS BLD QL: SLIGHT — SIGNIFICANT CHANGE UP
BASO STIPL BLD QL SMEAR: PRESENT — SIGNIFICANT CHANGE UP
BASOPHILS # BLD AUTO: 0.2 K/UL — SIGNIFICANT CHANGE UP (ref 0–0.2)
BASOPHILS NFR BLD AUTO: 3.4 % — HIGH (ref 0–2)
EOSINOPHIL # BLD AUTO: 0 K/UL — SIGNIFICANT CHANGE UP (ref 0–0.5)
EOSINOPHIL NFR BLD AUTO: 0.8 % — SIGNIFICANT CHANGE UP (ref 0–6)
HCT VFR BLD CALC: 24.4 % — LOW (ref 34.5–45)
HGB BLD-MCNC: 8.8 G/DL — LOW (ref 11.5–15.5)
LYMPHOCYTES # BLD AUTO: 1.4 K/UL — SIGNIFICANT CHANGE UP (ref 1–3.3)
LYMPHOCYTES # BLD AUTO: 26.9 % — SIGNIFICANT CHANGE UP (ref 13–44)
MACROCYTES BLD QL: SLIGHT — SIGNIFICANT CHANGE UP
MCHC RBC-ENTMCNC: 33.7 PG — SIGNIFICANT CHANGE UP (ref 27–34)
MCHC RBC-ENTMCNC: 36 GM/DL — SIGNIFICANT CHANGE UP (ref 32–36)
MCV RBC AUTO: 93.7 FL — SIGNIFICANT CHANGE UP (ref 80–100)
MICROCYTES BLD QL: SLIGHT — SIGNIFICANT CHANGE UP
MONOCYTES # BLD AUTO: 0.6 K/UL — SIGNIFICANT CHANGE UP (ref 0–0.9)
MONOCYTES NFR BLD AUTO: 12 % — SIGNIFICANT CHANGE UP (ref 2–14)
NEUTROPHILS # BLD AUTO: 2.9 K/UL — SIGNIFICANT CHANGE UP (ref 1.8–7.4)
NEUTROPHILS NFR BLD AUTO: 56.9 % — SIGNIFICANT CHANGE UP (ref 43–77)
PLAT MORPH BLD: NORMAL — SIGNIFICANT CHANGE UP
PLATELET # BLD AUTO: 71 K/UL — LOW (ref 150–400)
POIKILOCYTOSIS BLD QL AUTO: SLIGHT — SIGNIFICANT CHANGE UP
POLYCHROMASIA BLD QL SMEAR: SLIGHT — SIGNIFICANT CHANGE UP
RBC # BLD: 2.61 M/UL — LOW (ref 3.8–5.2)
RBC # FLD: 17 % — HIGH (ref 10.3–14.5)
RBC BLD AUTO: ABNORMAL
SPHEROCYTES BLD QL SMEAR: SLIGHT — SIGNIFICANT CHANGE UP
WBC # BLD: 5.1 K/UL — SIGNIFICANT CHANGE UP (ref 3.8–10.5)
WBC # FLD AUTO: 5.1 K/UL — SIGNIFICANT CHANGE UP (ref 3.8–10.5)

## 2017-11-21 PROCEDURE — 99214 OFFICE O/P EST MOD 30 MIN: CPT

## 2017-11-22 ENCOUNTER — APPOINTMENT (OUTPATIENT)
Dept: INFUSION THERAPY | Facility: CLINIC | Age: 67
End: 2017-11-22

## 2017-12-08 ENCOUNTER — OUTPATIENT (OUTPATIENT)
Dept: OUTPATIENT SERVICES | Facility: HOSPITAL | Age: 67
LOS: 1 days | End: 2017-12-08
Payer: MEDICARE

## 2017-12-08 VITALS
RESPIRATION RATE: 16 BRPM | TEMPERATURE: 98 F | WEIGHT: 152.12 LBS | DIASTOLIC BLOOD PRESSURE: 80 MMHG | HEART RATE: 80 BPM | SYSTOLIC BLOOD PRESSURE: 140 MMHG | HEIGHT: 62 IN

## 2017-12-08 DIAGNOSIS — C54.1 MALIGNANT NEOPLASM OF ENDOMETRIUM: ICD-10-CM

## 2017-12-08 DIAGNOSIS — Z01.818 ENCOUNTER FOR OTHER PREPROCEDURAL EXAMINATION: ICD-10-CM

## 2017-12-08 LAB
ALBUMIN SERPL ELPH-MCNC: 4.6 G/DL — SIGNIFICANT CHANGE UP (ref 3.3–5.2)
ALP SERPL-CCNC: 63 U/L — SIGNIFICANT CHANGE UP (ref 40–120)
ALT FLD-CCNC: 9 U/L — SIGNIFICANT CHANGE UP
ANION GAP SERPL CALC-SCNC: 15 MMOL/L — SIGNIFICANT CHANGE UP (ref 5–17)
APTT BLD: 31.9 SEC — SIGNIFICANT CHANGE UP (ref 27.5–37.4)
AST SERPL-CCNC: 13 U/L — SIGNIFICANT CHANGE UP
BASOPHILS # BLD AUTO: 0 K/UL — SIGNIFICANT CHANGE UP (ref 0–0.2)
BASOPHILS NFR BLD AUTO: 0.3 % — SIGNIFICANT CHANGE UP (ref 0–2)
BILIRUB DIRECT SERPL-MCNC: 0.2 MG/DL — SIGNIFICANT CHANGE UP (ref 0–0.3)
BILIRUB INDIRECT FLD-MCNC: 0.7 MG/DL — SIGNIFICANT CHANGE UP (ref 0.2–1)
BILIRUB SERPL-MCNC: 0.9 MG/DL — SIGNIFICANT CHANGE UP (ref 0.4–2)
BUN SERPL-MCNC: 16 MG/DL — SIGNIFICANT CHANGE UP (ref 8–20)
CALCIUM SERPL-MCNC: 9.7 MG/DL — SIGNIFICANT CHANGE UP (ref 8.6–10.2)
CHLORIDE SERPL-SCNC: 101 MMOL/L — SIGNIFICANT CHANGE UP (ref 98–107)
CO2 SERPL-SCNC: 27 MMOL/L — SIGNIFICANT CHANGE UP (ref 22–29)
COMMENT - BLOOD BANK: SIGNIFICANT CHANGE UP
CREAT SERPL-MCNC: 0.65 MG/DL — SIGNIFICANT CHANGE UP (ref 0.5–1.3)
EOSINOPHIL # BLD AUTO: 0 K/UL — SIGNIFICANT CHANGE UP (ref 0–0.5)
EOSINOPHIL NFR BLD AUTO: 1.3 % — SIGNIFICANT CHANGE UP (ref 0–6)
GLUCOSE SERPL-MCNC: 102 MG/DL — SIGNIFICANT CHANGE UP (ref 70–115)
HBA1C BLD-MCNC: 4.3 % — SIGNIFICANT CHANGE UP (ref 4–5.6)
HCT VFR BLD CALC: 25.9 % — LOW (ref 37–47)
HGB BLD-MCNC: 8.8 G/DL — LOW (ref 12–16)
INR BLD: 1.03 RATIO — SIGNIFICANT CHANGE UP (ref 0.88–1.16)
LYMPHOCYTES # BLD AUTO: 1.1 K/UL — SIGNIFICANT CHANGE UP (ref 1–4.8)
LYMPHOCYTES # BLD AUTO: 29.2 % — SIGNIFICANT CHANGE UP (ref 20–55)
MCHC RBC-ENTMCNC: 34 G/DL — SIGNIFICANT CHANGE UP (ref 32–36)
MCHC RBC-ENTMCNC: 34.5 PG — HIGH (ref 27–31)
MCV RBC AUTO: 101.6 FL — HIGH (ref 81–99)
MONOCYTES # BLD AUTO: 0.3 K/UL — SIGNIFICANT CHANGE UP (ref 0–0.8)
MONOCYTES NFR BLD AUTO: 8.3 % — SIGNIFICANT CHANGE UP (ref 3–10)
NEUTROPHILS # BLD AUTO: 2.3 K/UL — SIGNIFICANT CHANGE UP (ref 1.8–8)
NEUTROPHILS NFR BLD AUTO: 60.6 % — SIGNIFICANT CHANGE UP (ref 37–73)
PLATELET # BLD AUTO: 162 K/UL — SIGNIFICANT CHANGE UP (ref 150–400)
POTASSIUM SERPL-MCNC: 3.2 MMOL/L — LOW (ref 3.5–5.3)
POTASSIUM SERPL-SCNC: 3.2 MMOL/L — LOW (ref 3.5–5.3)
PROT SERPL-MCNC: 7.4 G/DL — SIGNIFICANT CHANGE UP (ref 6.6–8.7)
PROTHROM AB SERPL-ACNC: 11.3 SEC — SIGNIFICANT CHANGE UP (ref 9.8–12.7)
RBC # BLD: 2.55 M/UL — LOW (ref 4.4–5.2)
RBC # FLD: 21.6 % — HIGH (ref 11–15.6)
SODIUM SERPL-SCNC: 143 MMOL/L — SIGNIFICANT CHANGE UP (ref 135–145)
TYPE + AB SCN PNL BLD: SIGNIFICANT CHANGE UP
WBC # BLD: 3.8 K/UL — LOW (ref 4.8–10.8)
WBC # FLD AUTO: 3.8 K/UL — LOW (ref 4.8–10.8)

## 2017-12-08 PROCEDURE — 71020: CPT | Mod: 26

## 2017-12-08 PROCEDURE — 85610 PROTHROMBIN TIME: CPT

## 2017-12-08 PROCEDURE — 86901 BLOOD TYPING SEROLOGIC RH(D): CPT

## 2017-12-08 PROCEDURE — 93010 ELECTROCARDIOGRAM REPORT: CPT

## 2017-12-08 PROCEDURE — 80076 HEPATIC FUNCTION PANEL: CPT

## 2017-12-08 PROCEDURE — G0463: CPT

## 2017-12-08 PROCEDURE — 85027 COMPLETE CBC AUTOMATED: CPT

## 2017-12-08 PROCEDURE — 36415 COLL VENOUS BLD VENIPUNCTURE: CPT

## 2017-12-08 PROCEDURE — 86900 BLOOD TYPING SEROLOGIC ABO: CPT

## 2017-12-08 PROCEDURE — 86304 IMMUNOASSAY TUMOR CA 125: CPT

## 2017-12-08 PROCEDURE — 85730 THROMBOPLASTIN TIME PARTIAL: CPT

## 2017-12-08 PROCEDURE — 71046 X-RAY EXAM CHEST 2 VIEWS: CPT

## 2017-12-08 PROCEDURE — 83036 HEMOGLOBIN GLYCOSYLATED A1C: CPT

## 2017-12-08 PROCEDURE — 80048 BASIC METABOLIC PNL TOTAL CA: CPT

## 2017-12-08 PROCEDURE — 86850 RBC ANTIBODY SCREEN: CPT

## 2017-12-08 PROCEDURE — 93005 ELECTROCARDIOGRAM TRACING: CPT

## 2017-12-08 RX ORDER — GENTAMICIN SULFATE 40 MG/ML
150 VIAL (ML) INJECTION ONCE
Qty: 0 | Refills: 0 | Status: DISCONTINUED | OUTPATIENT
Start: 2017-12-18 | End: 2017-12-21

## 2017-12-08 RX ORDER — SODIUM CHLORIDE 9 MG/ML
3 INJECTION INTRAMUSCULAR; INTRAVENOUS; SUBCUTANEOUS EVERY 8 HOURS
Qty: 0 | Refills: 0 | Status: DISCONTINUED | OUTPATIENT
Start: 2017-12-18 | End: 2017-12-21

## 2017-12-08 NOTE — H&P PST ADULT - HISTORY OF PRESENT ILLNESS
67 year old female with a history of dyslipidemia presents with endometrial cancer s/p chemo scheduled for a SHAWNA BSO pelvic para aortic lymphadenectomy omentectomy on 12/18/17.

## 2017-12-08 NOTE — H&P PST ADULT - PROBLEM SELECTOR PLAN 1
CBC, BMP, T&S, Ca125, HgbA1C, CXR, PT/PTT/INR hepatic profile pending.  Medical clearance scheduled for 12/14/17-will request a copy for chart. CBC, BMP, T&S, Ca125, HgbA1C, CXR, PT/PTT/INR, hepatic profile pending.  Medical clearance scheduled for 12/14/17-will request a copy for chart.

## 2017-12-08 NOTE — H&P PST ADULT - ASSESSMENT
67 year old female presents with endometrial cancer scheduled for a HSAWNA BSO pelvic para aortic lymphadenectomy omentectomy on 12/18/17. 67 year old female presents with endometrial cancer scheduled for a SHAWNA BSO pelvic para aortic lymphadenectomy and omentectomy on 12/18/17.

## 2017-12-10 LAB — CANCER AG125 SERPL-ACNC: 36 U/ML — HIGH

## 2017-12-14 ENCOUNTER — APPOINTMENT (OUTPATIENT)
Dept: INFUSION THERAPY | Facility: CLINIC | Age: 67
End: 2017-12-14

## 2017-12-18 ENCOUNTER — RESULT REVIEW (OUTPATIENT)
Age: 67
End: 2017-12-18

## 2017-12-18 ENCOUNTER — INPATIENT (INPATIENT)
Facility: HOSPITAL | Age: 67
LOS: 2 days | Discharge: HOME | DRG: 740 | End: 2017-12-21
Attending: OBSTETRICS & GYNECOLOGY | Admitting: OBSTETRICS & GYNECOLOGY
Payer: MEDICARE

## 2017-12-18 VITALS
HEIGHT: 62 IN | TEMPERATURE: 98 F | DIASTOLIC BLOOD PRESSURE: 62 MMHG | HEART RATE: 91 BPM | WEIGHT: 152.12 LBS | OXYGEN SATURATION: 100 % | SYSTOLIC BLOOD PRESSURE: 131 MMHG | RESPIRATION RATE: 18 BRPM

## 2017-12-18 DIAGNOSIS — C54.1 MALIGNANT NEOPLASM OF ENDOMETRIUM: ICD-10-CM

## 2017-12-18 LAB
GLUCOSE BLDC GLUCOMTR-MCNC: 121 MG/DL — HIGH (ref 70–99)
GLUCOSE BLDC GLUCOMTR-MCNC: 128 MG/DL — HIGH (ref 70–99)
GLUCOSE BLDC GLUCOMTR-MCNC: 151 MG/DL — HIGH (ref 70–99)
TYPE + AB SCN PNL BLD: SIGNIFICANT CHANGE UP

## 2017-12-18 PROCEDURE — 88341 IMHCHEM/IMCYTCHM EA ADD ANTB: CPT | Mod: 26

## 2017-12-18 PROCEDURE — 88309 TISSUE EXAM BY PATHOLOGIST: CPT | Mod: 26

## 2017-12-18 PROCEDURE — 88342 IMHCHEM/IMCYTCHM 1ST ANTB: CPT | Mod: 26

## 2017-12-18 PROCEDURE — 88307 TISSUE EXAM BY PATHOLOGIST: CPT | Mod: 26

## 2017-12-18 PROCEDURE — 88305 TISSUE EXAM BY PATHOLOGIST: CPT | Mod: 26

## 2017-12-18 RX ORDER — ONDANSETRON 8 MG/1
4 TABLET, FILM COATED ORAL ONCE
Qty: 0 | Refills: 0 | Status: DISCONTINUED | OUTPATIENT
Start: 2017-12-18 | End: 2017-12-18

## 2017-12-18 RX ORDER — ENOXAPARIN SODIUM 100 MG/ML
40 INJECTION SUBCUTANEOUS ONCE
Qty: 0 | Refills: 0 | Status: COMPLETED | OUTPATIENT
Start: 2017-12-18 | End: 2017-12-18

## 2017-12-18 RX ORDER — HYDROMORPHONE HYDROCHLORIDE 2 MG/ML
30 INJECTION INTRAMUSCULAR; INTRAVENOUS; SUBCUTANEOUS
Qty: 0 | Refills: 0 | Status: DISCONTINUED | OUTPATIENT
Start: 2017-12-18 | End: 2017-12-19

## 2017-12-18 RX ORDER — VANCOMYCIN HCL 1 G
1000 VIAL (EA) INTRAVENOUS ONCE
Qty: 0 | Refills: 0 | Status: COMPLETED | OUTPATIENT
Start: 2017-12-18 | End: 2017-12-18

## 2017-12-18 RX ORDER — ENOXAPARIN SODIUM 100 MG/ML
40 INJECTION SUBCUTANEOUS DAILY
Qty: 0 | Refills: 0 | Status: DISCONTINUED | OUTPATIENT
Start: 2017-12-19 | End: 2017-12-21

## 2017-12-18 RX ORDER — ENOXAPARIN SODIUM 100 MG/ML
40 INJECTION SUBCUTANEOUS
Qty: 28 | Refills: 0 | OUTPATIENT
Start: 2017-12-18 | End: 2018-01-14

## 2017-12-18 RX ORDER — SODIUM CHLORIDE 9 MG/ML
1000 INJECTION, SOLUTION INTRAVENOUS
Qty: 0 | Refills: 0 | Status: DISCONTINUED | OUTPATIENT
Start: 2017-12-18 | End: 2017-12-18

## 2017-12-18 RX ORDER — HYDROMORPHONE HYDROCHLORIDE 2 MG/ML
0.5 INJECTION INTRAMUSCULAR; INTRAVENOUS; SUBCUTANEOUS
Qty: 0 | Refills: 0 | Status: DISCONTINUED | OUTPATIENT
Start: 2017-12-18 | End: 2017-12-18

## 2017-12-18 RX ORDER — NALOXONE HYDROCHLORIDE 4 MG/.1ML
0.1 SPRAY NASAL
Qty: 0 | Refills: 0 | Status: DISCONTINUED | OUTPATIENT
Start: 2017-12-18 | End: 2017-12-21

## 2017-12-18 RX ORDER — VANCOMYCIN HCL 1 G
1000 VIAL (EA) INTRAVENOUS ONCE
Qty: 0 | Refills: 0 | Status: COMPLETED | OUTPATIENT
Start: 2017-12-19 | End: 2017-12-19

## 2017-12-18 RX ORDER — DEXTROSE MONOHYDRATE, SODIUM CHLORIDE, AND POTASSIUM CHLORIDE 50; .745; 4.5 G/1000ML; G/1000ML; G/1000ML
1000 INJECTION, SOLUTION INTRAVENOUS
Qty: 0 | Refills: 0 | Status: DISCONTINUED | OUTPATIENT
Start: 2017-12-18 | End: 2017-12-19

## 2017-12-18 RX ORDER — DOCUSATE SODIUM 100 MG
100 CAPSULE ORAL THREE TIMES A DAY
Qty: 0 | Refills: 0 | Status: DISCONTINUED | OUTPATIENT
Start: 2017-12-18 | End: 2017-12-21

## 2017-12-18 RX ORDER — SODIUM CHLORIDE 9 MG/ML
1000 INJECTION, SOLUTION INTRAVENOUS ONCE
Qty: 0 | Refills: 0 | Status: COMPLETED | OUTPATIENT
Start: 2017-12-18 | End: 2017-12-18

## 2017-12-18 RX ORDER — ATORVASTATIN CALCIUM 80 MG/1
40 TABLET, FILM COATED ORAL AT BEDTIME
Qty: 0 | Refills: 0 | Status: DISCONTINUED | OUTPATIENT
Start: 2017-12-18 | End: 2017-12-21

## 2017-12-18 RX ORDER — ONDANSETRON 8 MG/1
4 TABLET, FILM COATED ORAL EVERY 6 HOURS
Qty: 0 | Refills: 0 | Status: DISCONTINUED | OUTPATIENT
Start: 2017-12-18 | End: 2017-12-19

## 2017-12-18 RX ORDER — ONDANSETRON 8 MG/1
4 TABLET, FILM COATED ORAL EVERY 6 HOURS
Qty: 0 | Refills: 0 | Status: DISCONTINUED | OUTPATIENT
Start: 2017-12-18 | End: 2017-12-21

## 2017-12-18 RX ADMIN — Medication 100 MILLIGRAM(S): at 22:08

## 2017-12-18 RX ADMIN — HYDROMORPHONE HYDROCHLORIDE 30 MILLILITER(S): 2 INJECTION INTRAMUSCULAR; INTRAVENOUS; SUBCUTANEOUS at 21:03

## 2017-12-18 RX ADMIN — HYDROMORPHONE HYDROCHLORIDE 30 MILLILITER(S): 2 INJECTION INTRAMUSCULAR; INTRAVENOUS; SUBCUTANEOUS at 17:41

## 2017-12-18 RX ADMIN — Medication 250 MILLIGRAM(S): at 12:51

## 2017-12-18 RX ADMIN — ATORVASTATIN CALCIUM 40 MILLIGRAM(S): 80 TABLET, FILM COATED ORAL at 23:17

## 2017-12-18 RX ADMIN — SODIUM CHLORIDE 1000 MILLILITER(S): 9 INJECTION, SOLUTION INTRAVENOUS at 21:59

## 2017-12-18 RX ADMIN — ENOXAPARIN SODIUM 40 MILLIGRAM(S): 100 INJECTION SUBCUTANEOUS at 22:08

## 2017-12-18 NOTE — PROGRESS NOTE ADULT - SUBJECTIVE AND OBJECTIVE BOX
GYNECOLOGIC ONCOLOGY PROGRESS NOTE        PROBLEMS:  Endometrial cancer      Pt seen and examined at bedside.     SUBJECTIVE:    Patient is without complaints.  Pain well-controlled.  Denies Nausea, Vomiting or Diarrhea.  Denies shortness of breath, chest pain or dyspnea on exertion.      OBJECTIVE:     VITALS:  T(F): 97.9 (12-18-17 @ 16:50), Max: 97.9 (12-18-17 @ 16:50)  HR: 55 (12-18-17 @ 18:45) (55 - 91)  BP: 103/49 (12-18-17 @ 18:45) (97/49 - 131/62)  RR: 15 (12-18-17 @ 18:45) (13 - 19)  SpO2: 100% (12-18-17 @ 18:45) (95% - 100%)  Wt(kg): --    I&O's Summary  silveira with 90 ml over 2 hrs      MEDICATIONS  (STANDING):  atorvastatin 40 milliGRAM(s) Oral at bedtime  dextrose 5% + sodium chloride 0.45% with potassium chloride 20 mEq/L 1000 milliLiter(s) (125 mL/Hr) IV Continuous <Continuous>  docusate sodium 100 milliGRAM(s) Oral three times a day  enoxaparin Injectable 40 milliGRAM(s) SubCutaneous once  gentamicin   IVPB 150 milliGRAM(s) IV Intermittent once  HYDROmorphone PCA (1 mG/mL) 30 milliLiter(s) PCA Continuous PCA Continuous  lactated ringers. 1000 milliLiter(s) (100 mL/Hr) IV Continuous <Continuous>  naproxen 500 milliGRAM(s) Oral every 12 hours    MEDICATIONS  (PRN):  HYDROmorphone  Injectable 0.5 milliGRAM(s) IV Push every 10 minutes PRN Moderate Pain (4 - 6)  naloxone Injectable 0.1 milliGRAM(s) IV Push every 3 minutes PRN For ANY of the following changes in patient status:  A. RR LESS THAN 10 breaths per minute, B. Oxygen saturation LESS THAN 90%, C. Sedation score of 6  ondansetron Injectable 4 milliGRAM(s) IV Push once PRN Nausea and/or Vomiting  ondansetron Injectable 4 milliGRAM(s) IV Push every 6 hours PRN Nausea  ondansetron Injectable 4 milliGRAM(s) IV Push every 6 hours PRN Postoperative Nausea and/or Vomiting      Physical Exam:  Constitutional: NAD  Pulmonary: clear to auscultation bilaterally   Cardiovascular: Regular rate and rhythm on auscultation   Abdomen: soft, non-tender, non-distended, no bowel sounds.   Extremities: no lower extremity edema or calf tenderness.  Incision: with Provena in place. Provena clean, dry, intact.

## 2017-12-18 NOTE — PROGRESS NOTE ADULT - ASSESSMENT
67 year old s/p SHAWNA BSO PPALND omentectomy, sigmoid resection and anastomosis today. Recovering well.

## 2017-12-18 NOTE — PROGRESS NOTE ADULT - PROBLEM SELECTOR PLAN 1
1. Regular diet  2. Colace TID  3. OOB as tolerated in AM  4. Pain control with PCA  5. DVT prophylaxis with lovenox  6. BLANCA silveira in AM  7. Incentive Spirometer    Further plan per Dr. Jaimes

## 2017-12-19 DIAGNOSIS — D64.9 ANEMIA, UNSPECIFIED: ICD-10-CM

## 2017-12-19 LAB
ALBUMIN SERPL ELPH-MCNC: 3.5 G/DL — SIGNIFICANT CHANGE UP (ref 3.3–5.2)
ALP SERPL-CCNC: 43 U/L — SIGNIFICANT CHANGE UP (ref 40–120)
ALT FLD-CCNC: 7 U/L — SIGNIFICANT CHANGE UP
ANION GAP SERPL CALC-SCNC: 13 MMOL/L — SIGNIFICANT CHANGE UP (ref 5–17)
AST SERPL-CCNC: 12 U/L — SIGNIFICANT CHANGE UP
BILIRUB SERPL-MCNC: 0.9 MG/DL — SIGNIFICANT CHANGE UP (ref 0.4–2)
BUN SERPL-MCNC: 17 MG/DL — SIGNIFICANT CHANGE UP (ref 8–20)
CALCIUM SERPL-MCNC: 8 MG/DL — LOW (ref 8.6–10.2)
CHLORIDE SERPL-SCNC: 100 MMOL/L — SIGNIFICANT CHANGE UP (ref 98–107)
CO2 SERPL-SCNC: 24 MMOL/L — SIGNIFICANT CHANGE UP (ref 22–29)
CREAT SERPL-MCNC: 0.87 MG/DL — SIGNIFICANT CHANGE UP (ref 0.5–1.3)
GLUCOSE SERPL-MCNC: 171 MG/DL — HIGH (ref 70–115)
HCT VFR BLD CALC: 22.2 % — LOW (ref 37–47)
HCT VFR BLD CALC: 27.4 % — LOW (ref 37–47)
HGB BLD-MCNC: 7.4 G/DL — LOW (ref 12–16)
HGB BLD-MCNC: 9.2 G/DL — LOW (ref 12–16)
LACTATE SERPL-SCNC: 2 MMOL/L — SIGNIFICANT CHANGE UP (ref 0.5–2)
MAGNESIUM SERPL-MCNC: 1.6 MG/DL — SIGNIFICANT CHANGE UP (ref 1.6–2.6)
MCHC RBC-ENTMCNC: 33.3 G/DL — SIGNIFICANT CHANGE UP (ref 32–36)
MCHC RBC-ENTMCNC: 33.3 PG — HIGH (ref 27–31)
MCHC RBC-ENTMCNC: 33.6 G/DL — SIGNIFICANT CHANGE UP (ref 32–36)
MCHC RBC-ENTMCNC: 34.9 PG — HIGH (ref 27–31)
MCV RBC AUTO: 104.7 FL — HIGH (ref 81–99)
MCV RBC AUTO: 99.3 FL — HIGH (ref 81–99)
PHOSPHATE SERPL-MCNC: 3.8 MG/DL — SIGNIFICANT CHANGE UP (ref 2.4–4.7)
PLATELET # BLD AUTO: 159 K/UL — SIGNIFICANT CHANGE UP (ref 150–400)
PLATELET # BLD AUTO: 163 K/UL — SIGNIFICANT CHANGE UP (ref 150–400)
POTASSIUM SERPL-MCNC: 4.3 MMOL/L — SIGNIFICANT CHANGE UP (ref 3.5–5.3)
POTASSIUM SERPL-SCNC: 4.3 MMOL/L — SIGNIFICANT CHANGE UP (ref 3.5–5.3)
PROT SERPL-MCNC: 5.6 G/DL — LOW (ref 6.6–8.7)
RBC # BLD: 2.12 M/UL — LOW (ref 4.4–5.2)
RBC # BLD: 2.76 M/UL — LOW (ref 4.4–5.2)
RBC # FLD: 21.3 % — HIGH (ref 11–15.6)
RBC # FLD: 23.4 % — HIGH (ref 11–15.6)
SODIUM SERPL-SCNC: 137 MMOL/L — SIGNIFICANT CHANGE UP (ref 135–145)
WBC # BLD: 6.2 K/UL — SIGNIFICANT CHANGE UP (ref 4.8–10.8)
WBC # BLD: 6.3 K/UL — SIGNIFICANT CHANGE UP (ref 4.8–10.8)
WBC # FLD AUTO: 6.2 K/UL — SIGNIFICANT CHANGE UP (ref 4.8–10.8)
WBC # FLD AUTO: 6.3 K/UL — SIGNIFICANT CHANGE UP (ref 4.8–10.8)

## 2017-12-19 RX ORDER — ACETAMINOPHEN 500 MG
1000 TABLET ORAL ONCE
Qty: 0 | Refills: 0 | Status: COMPLETED | OUTPATIENT
Start: 2017-12-19 | End: 2017-12-19

## 2017-12-19 RX ORDER — FAMOTIDINE 10 MG/ML
20 INJECTION INTRAVENOUS ONCE
Qty: 0 | Refills: 0 | Status: COMPLETED | OUTPATIENT
Start: 2017-12-19 | End: 2017-12-19

## 2017-12-19 RX ORDER — SODIUM CHLORIDE 9 MG/ML
1000 INJECTION INTRAMUSCULAR; INTRAVENOUS; SUBCUTANEOUS ONCE
Qty: 0 | Refills: 0 | Status: COMPLETED | OUTPATIENT
Start: 2017-12-19 | End: 2017-12-19

## 2017-12-19 RX ORDER — OXYCODONE AND ACETAMINOPHEN 5; 325 MG/1; MG/1
1 TABLET ORAL EVERY 4 HOURS
Qty: 0 | Refills: 0 | Status: DISCONTINUED | OUTPATIENT
Start: 2017-12-19 | End: 2017-12-21

## 2017-12-19 RX ORDER — OXYCODONE AND ACETAMINOPHEN 5; 325 MG/1; MG/1
2 TABLET ORAL EVERY 4 HOURS
Qty: 0 | Refills: 0 | Status: DISCONTINUED | OUTPATIENT
Start: 2017-12-19 | End: 2017-12-21

## 2017-12-19 RX ORDER — DEXTROSE MONOHYDRATE, SODIUM CHLORIDE, AND POTASSIUM CHLORIDE 50; .745; 4.5 G/1000ML; G/1000ML; G/1000ML
1000 INJECTION, SOLUTION INTRAVENOUS
Qty: 0 | Refills: 0 | Status: DISCONTINUED | OUTPATIENT
Start: 2017-12-19 | End: 2017-12-20

## 2017-12-19 RX ADMIN — Medication 100 MILLIGRAM(S): at 05:32

## 2017-12-19 RX ADMIN — ATORVASTATIN CALCIUM 40 MILLIGRAM(S): 80 TABLET, FILM COATED ORAL at 21:48

## 2017-12-19 RX ADMIN — Medication 400 MILLIGRAM(S): at 23:20

## 2017-12-19 RX ADMIN — OXYCODONE AND ACETAMINOPHEN 1 TABLET(S): 5; 325 TABLET ORAL at 18:00

## 2017-12-19 RX ADMIN — Medication 500 MILLIGRAM(S): at 05:32

## 2017-12-19 RX ADMIN — DEXTROSE MONOHYDRATE, SODIUM CHLORIDE, AND POTASSIUM CHLORIDE 125 MILLILITER(S): 50; .745; 4.5 INJECTION, SOLUTION INTRAVENOUS at 13:10

## 2017-12-19 RX ADMIN — SODIUM CHLORIDE 3 MILLILITER(S): 9 INJECTION INTRAMUSCULAR; INTRAVENOUS; SUBCUTANEOUS at 21:14

## 2017-12-19 RX ADMIN — DEXTROSE MONOHYDRATE, SODIUM CHLORIDE, AND POTASSIUM CHLORIDE 60 MILLILITER(S): 50; .745; 4.5 INJECTION, SOLUTION INTRAVENOUS at 21:48

## 2017-12-19 RX ADMIN — SODIUM CHLORIDE 500 MILLILITER(S): 9 INJECTION INTRAMUSCULAR; INTRAVENOUS; SUBCUTANEOUS at 21:47

## 2017-12-19 RX ADMIN — Medication 100 MILLIGRAM(S): at 13:13

## 2017-12-19 RX ADMIN — OXYCODONE AND ACETAMINOPHEN 1 TABLET(S): 5; 325 TABLET ORAL at 17:28

## 2017-12-19 RX ADMIN — Medication 100 MILLIGRAM(S): at 21:48

## 2017-12-19 RX ADMIN — Medication 500 MILLIGRAM(S): at 06:18

## 2017-12-19 RX ADMIN — Medication 1000 MILLIGRAM(S): at 23:40

## 2017-12-19 RX ADMIN — Medication 250 MILLIGRAM(S): at 04:25

## 2017-12-19 RX ADMIN — Medication 400 MILLIGRAM(S): at 14:15

## 2017-12-19 RX ADMIN — FAMOTIDINE 20 MILLIGRAM(S): 10 INJECTION INTRAVENOUS at 13:13

## 2017-12-19 RX ADMIN — SODIUM CHLORIDE 3 MILLILITER(S): 9 INJECTION INTRAMUSCULAR; INTRAVENOUS; SUBCUTANEOUS at 14:16

## 2017-12-19 RX ADMIN — OXYCODONE AND ACETAMINOPHEN 2 TABLET(S): 5; 325 TABLET ORAL at 11:03

## 2017-12-19 RX ADMIN — SODIUM CHLORIDE 3 MILLILITER(S): 9 INJECTION INTRAMUSCULAR; INTRAVENOUS; SUBCUTANEOUS at 05:32

## 2017-12-19 RX ADMIN — Medication 1000 MILLIGRAM(S): at 15:00

## 2017-12-19 RX ADMIN — HYDROMORPHONE HYDROCHLORIDE 30 MILLILITER(S): 2 INJECTION INTRAMUSCULAR; INTRAVENOUS; SUBCUTANEOUS at 07:33

## 2017-12-19 RX ADMIN — DEXTROSE MONOHYDRATE, SODIUM CHLORIDE, AND POTASSIUM CHLORIDE 125 MILLILITER(S): 50; .745; 4.5 INJECTION, SOLUTION INTRAVENOUS at 04:26

## 2017-12-19 RX ADMIN — OXYCODONE AND ACETAMINOPHEN 2 TABLET(S): 5; 325 TABLET ORAL at 11:35

## 2017-12-19 NOTE — PROGRESS NOTE ADULT - SUBJECTIVE AND OBJECTIVE BOX
pt pod 1 sp TAHBSO, omentectomy under GETA. Tolerated well. Denies any A/c. pt with low bp throughout night. team aware. fluids given as well as 1 u PRBC.   pt with no n/v @ this time. was using pain meds/PCA as ordered/needed with some pain relief. vss. spont vent. no issues with anesthesia at this time. pt resting comfortably @ this time.

## 2017-12-19 NOTE — PROGRESS NOTE ADULT - SUBJECTIVE AND OBJECTIVE BOX
Patient seen and examined this afternoon with Dr. Jaimes, recovering well.   BP trend noted, likely secondary to anesthesia/pain medication. S/p 2 units  PRBC today, post transfusion CBC pending. Patient is tolerating regular food  and voiding, OOB/ambulation encouraged. Will follow up PM CBC and continue   to monitor BP.

## 2017-12-19 NOTE — PROGRESS NOTE ADULT - ASSESSMENT
67 year old s/p SHAWNA BSO PPALND omentectomy, sigmoid resection and anastomosis today. Recovering well. 68 yo POD#1 s/p SHAWNA BSO PPALND omentectomy, sigmoid resection and anastomosis today. Recovering well.

## 2017-12-19 NOTE — PROGRESS NOTE ADULT - PROBLEM SELECTOR PLAN 2
currently receiving 1U PRBC  transfuse additional 1U PRBC  F/U post PRBC transfusion CBC currently receiving 1U PRBC  transfuse additional 1U PRBC for total of 2 units  F/U post PRBC transfusion CBC

## 2017-12-19 NOTE — CHART NOTE - NSCHARTNOTEFT_GEN_A_CORE
Patient with small drop in H/H on post op labs. Still asymptomatic. Has a sensation of thirst. No new complaints of pain. Blood consent obtained and order for 1U PRBC placed.  Will follow up post transfusion CBC.

## 2017-12-19 NOTE — PROGRESS NOTE ADULT - SUBJECTIVE AND OBJECTIVE BOX
GYNECOLOGIC ONCOLOGY PROGRESS NOTE    POD#1    PROBLEMS:  Endometrial cancer      Pt seen and examined at bedside, resting comfortably. Patient with asymptomatic hypotension overnight, noted with systolic BP 80-88 throughout the night. Patient s/p 1L LR bolus, currently receiving 1U PRBC.    SUBJECTIVE:    Patient is without complaints.  Pain well-controlled.  Flatus: No  Denies Nausea, Vomiting or Diarrhea.  Denies shortness of breath, chest pain or dyspnea on exertion.  Tolerating diet.    OBJECTIVE:     VITALS:  T(F): 98.6 (12-19-17 @ 05:40), Max: 98.6 (12-19-17 @ 04:49)  HR: 74 (12-19-17 @ 05:40) (55 - 91)  BP: 85/57 (12-19-17 @ 05:40) (80/42 - 131/62)  RR: 18 (12-19-17 @ 05:40) (12 - 19)  SpO2: 92% (12-19-17 @ 05:40) (92% - 100%)      I&O's Summary    Arce with 500mL output overnight       MEDICATIONS  (STANDING):  atorvastatin 40 milliGRAM(s) Oral at bedtime  dextrose 5% + sodium chloride 0.45% with potassium chloride 20 mEq/L 1000 milliLiter(s) (125 mL/Hr) IV Continuous <Continuous>  docusate sodium 100 milliGRAM(s) Oral three times a day  enoxaparin Injectable 40 milliGRAM(s) SubCutaneous daily  gentamicin   IVPB 150 milliGRAM(s) IV Intermittent once  naproxen 500 milliGRAM(s) Oral every 12 hours  sodium chloride 0.9% lock flush 3 milliLiter(s) IV Push every 8 hours    MEDICATIONS  (PRN):  naloxone Injectable 0.1 milliGRAM(s) IV Push every 3 minutes PRN For ANY of the following changes in patient status:  A. RR LESS THAN 10 breaths per minute, B. Oxygen saturation LESS THAN 90%, C. Sedation score of 6  ondansetron Injectable 4 milliGRAM(s) IV Push every 6 hours PRN Nausea  oxyCODONE    5 mG/acetaminophen 325 mG 1 Tablet(s) Oral every 4 hours PRN Moderate Pain (4 - 6)  oxyCODONE    5 mG/acetaminophen 325 mG 2 Tablet(s) Oral every 4 hours PRN Severe Pain (7 - 10)      Physical Exam:  Constitutional: NAD  Pulmonary: clear to auscultation bilaterally   Cardiovascular: Regular rate and rhythm   Abdomen: soft, non-tender, non-distended, normal bowel sounds  Extremities: no lower extremity edema or calve tenderness, Qasim's sign negative.  Incision: Clean, dry, prevena intact.  Without signs of infection or hernia.      LABS:                          7.4    6.2   )-----------( 159      ( 19 Dec 2017 03:35 )             22.2     12-19    137  |  100  |  17.0  ----------------------------<  171<H>  4.3   |  24.0  |  0.87    Ca    8.0<L>      19 Dec 2017 03:35  Phos  3.8     12-19  Mg     1.6     12-19    TPro  5.6<L>  /  Alb  3.5  /  TBili  0.9  /  DBili  x   /  AST  12  /  ALT  7   /  AlkPhos  43  12-19          Patient seen and examined with Dr. Hawkins. GYNECOLOGIC ONCOLOGY PROGRESS NOTE    POD#1    PROBLEMS:  Endometrial cancer      Pt seen and examined at bedside, resting comfortably. Patient with asymptomatic hypotension overnight, noted with systolic BP 80-88 throughout the night. Patient s/p 1L LR bolus, currently receiving 1U PRBC.    SUBJECTIVE:    Patient is without complaints.  Pain well-controlled.  Flatus: No  Denies Nausea, Vomiting or Diarrhea.  Denies shortness of breath, chest pain or dyspnea on exertion.  Tolerating diet.    OBJECTIVE:     VITALS:  T(F): 98.6 (12-19-17 @ 05:40), Max: 98.6 (12-19-17 @ 04:49)  HR: 74 (12-19-17 @ 05:40) (55 - 91)  BP: 85/57 (12-19-17 @ 05:40) (80/42 - 131/62)  RR: 18 (12-19-17 @ 05:40) (12 - 19)  SpO2: 92% (12-19-17 @ 05:40) (92% - 100%)    I&O's Summary    Arce with 500mL output overnight       MEDICATIONS  (STANDING):  atorvastatin 40 milliGRAM(s) Oral at bedtime  dextrose 5% + sodium chloride 0.45% with potassium chloride 20 mEq/L 1000 milliLiter(s) (125 mL/Hr) IV Continuous <Continuous>  docusate sodium 100 milliGRAM(s) Oral three times a day  enoxaparin Injectable 40 milliGRAM(s) SubCutaneous daily  gentamicin   IVPB 150 milliGRAM(s) IV Intermittent once  naproxen 500 milliGRAM(s) Oral every 12 hours  sodium chloride 0.9% lock flush 3 milliLiter(s) IV Push every 8 hours    MEDICATIONS  (PRN):  naloxone Injectable 0.1 milliGRAM(s) IV Push every 3 minutes PRN For ANY of the following changes in patient status:  A. RR LESS THAN 10 breaths per minute, B. Oxygen saturation LESS THAN 90%, C. Sedation score of 6  ondansetron Injectable 4 milliGRAM(s) IV Push every 6 hours PRN Nausea  oxyCODONE    5 mG/acetaminophen 325 mG 1 Tablet(s) Oral every 4 hours PRN Moderate Pain (4 - 6)  oxyCODONE    5 mG/acetaminophen 325 mG 2 Tablet(s) Oral every 4 hours PRN Severe Pain (7 - 10)      Physical Exam:  Constitutional: NAD  Pulmonary: clear to auscultation bilaterally   Cardiovascular: Regular rate and rhythm   Abdomen: soft, non-tender, non-distended, normal bowel sounds  Extremities: no lower extremity edema or calve tenderness, Qasim's sign negative.  Incision: Clean, dry, prevena intact.  Without signs of infection or hernia.      LABS:                          7.4    6.2   )-----------( 159      ( 19 Dec 2017 03:35 )             22.2     12-19    137  |  100  |  17.0  ----------------------------<  171<H>  4.3   |  24.0  |  0.87    Ca    8.0<L>      19 Dec 2017 03:35  Phos  3.8     12-19  Mg     1.6     12-19    TPro  5.6<L>  /  Alb  3.5  /  TBili  0.9  /  DBili  x   /  AST  12  /  ALT  7   /  AlkPhos  43  12-19        Patient seen and examined with Dr. Hawkins.

## 2017-12-19 NOTE — CHART NOTE - NSCHARTNOTEFT_GEN_A_CORE
Patient with asymptomatic hypotension in post op period. 500cc bolus x 1 given by ACP earlier in evening. Upon arrival to bedside patient awake and alert. She does not endorse chest pain, SOB, headache, lethargy. Complaining of vague abdominal pain, but manageable with PCA. Chart and Vitals reviewed    Vital Signs Last 24 Hrs  T(C): 36.6 (19 Dec 2017 01:12), Max: 36.6 (18 Dec 2017 16:50)  T(F): 97.9 (19 Dec 2017 01:12), Max: 97.9 (18 Dec 2017 16:50)  HR: 72 (19 Dec 2017 01:12) (55 - 91)  BP: 87/54 (19 Dec 2017 01:12) (80/42 - 131/62)  BP(mean): 58 (18 Dec 2017 19:00) (58 - 77)  RR: 18 (19 Dec 2017 01:12) (12 - 19)  SpO2: 93% (19 Dec 2017 01:12) (93% - 100%)    I&O's Detail    18 Dec 2017 07:01  -  19 Dec 2017 01:26  --------------------------------------------------------  IN:    lactated ringers.: 200 mL  Total IN: 200 mL    OUT:    Indwelling Catheter - Urethral: 200 mL  Total OUT: 200 mL    Total NET: 0 mL    MEDICATIONS  (STANDING):  atorvastatin 40 milliGRAM(s) Oral at bedtime  dextrose 5% + sodium chloride 0.45% with potassium chloride 20 mEq/L 1000 milliLiter(s) (125 mL/Hr) IV Continuous <Continuous>  docusate sodium 100 milliGRAM(s) Oral three times a day  enoxaparin Injectable 40 milliGRAM(s) SubCutaneous daily  gentamicin   IVPB 150 milliGRAM(s) IV Intermittent once  HYDROmorphone PCA (1 mG/mL) 30 milliLiter(s) PCA Continuous PCA Continuous  naproxen 500 milliGRAM(s) Oral every 12 hours  sodium chloride 0.9% lock flush 3 milliLiter(s) IV Push every 8 hours  vancomycin  IVPB 1000 milliGRAM(s) IV Intermittent once    MEDICATIONS  (PRN):  naloxone Injectable 0.1 milliGRAM(s) IV Push every 3 minutes PRN For ANY of the following changes in patient status:  A. RR LESS THAN 10 breaths per minute, B. Oxygen saturation LESS THAN 90%, C. Sedation score of 6  ondansetron Injectable 4 milliGRAM(s) IV Push every 6 hours PRN Nausea  ondansetron Injectable 4 milliGRAM(s) IV Push every 6 hours PRN Postoperative Nausea and/or Vomiting    Physical Exam:    General: Well appearing female in NAD  Neuro: A&O x 3, no focal deficits  Pulm: CTAB  CV: S1S2 RRR  Abd: skin vac in place. soft, appropriately TTP, no guarding or rebound  Ext; No c/c/e. brisk cap refill.     Assessment/Plan:   POD#0 SHAWNA/BSO, PPALND, omentectomy, sigmoid resection with anastomosis    - Low suspicion for any sequela related to surgery at present time as patient is not beta blocked and is not mounting a tachycardic response. Patient states her BP at home usually a bit higher than it is now, but offers no complaints. Dilaudid PCA may be contributing to some of the hypotension, but MAPs appropriate and exam is benign. Will have AM labs drawn at this time and will reevaluate patient based on symptoms and laboratory findings. Will continue with maintenance fluids at prescribed rate. No acute intervention is warranted. Will follow.

## 2017-12-19 NOTE — PROGRESS NOTE ADULT - PROBLEM SELECTOR PLAN 1
Regular diet  Colace TID  OOB as tolerated in AM  D/C PCA, pain control with PO pain medication  DVT prophylaxis with lovenox  Arce D/C, F/U TOV  Incentive Spirometer

## 2017-12-20 LAB
ANION GAP SERPL CALC-SCNC: 14 MMOL/L — SIGNIFICANT CHANGE UP (ref 5–17)
BASOPHILS # BLD AUTO: 0 K/UL — SIGNIFICANT CHANGE UP (ref 0–0.2)
BASOPHILS NFR BLD AUTO: 0.1 % — SIGNIFICANT CHANGE UP (ref 0–2)
BUN SERPL-MCNC: 13 MG/DL — SIGNIFICANT CHANGE UP (ref 8–20)
CALCIUM SERPL-MCNC: 8.7 MG/DL — SIGNIFICANT CHANGE UP (ref 8.6–10.2)
CHLORIDE SERPL-SCNC: 104 MMOL/L — SIGNIFICANT CHANGE UP (ref 98–107)
CO2 SERPL-SCNC: 24 MMOL/L — SIGNIFICANT CHANGE UP (ref 22–29)
CREAT SERPL-MCNC: 0.78 MG/DL — SIGNIFICANT CHANGE UP (ref 0.5–1.3)
EOSINOPHIL # BLD AUTO: 0.1 K/UL — SIGNIFICANT CHANGE UP (ref 0–0.5)
EOSINOPHIL NFR BLD AUTO: 0.8 % — SIGNIFICANT CHANGE UP (ref 0–6)
GLUCOSE SERPL-MCNC: 113 MG/DL — SIGNIFICANT CHANGE UP (ref 70–115)
HCT VFR BLD CALC: 28.9 % — LOW (ref 37–47)
HGB BLD-MCNC: 10 G/DL — LOW (ref 12–16)
LYMPHOCYTES # BLD AUTO: 0.7 K/UL — LOW (ref 1–4.8)
LYMPHOCYTES # BLD AUTO: 9.5 % — LOW (ref 20–55)
MCHC RBC-ENTMCNC: 33.7 PG — HIGH (ref 27–31)
MCHC RBC-ENTMCNC: 34.6 G/DL — SIGNIFICANT CHANGE UP (ref 32–36)
MCV RBC AUTO: 97.3 FL — SIGNIFICANT CHANGE UP (ref 81–99)
MONOCYTES # BLD AUTO: 0.5 K/UL — SIGNIFICANT CHANGE UP (ref 0–0.8)
MONOCYTES NFR BLD AUTO: 6.6 % — SIGNIFICANT CHANGE UP (ref 3–10)
NEUTROPHILS # BLD AUTO: 6.1 K/UL — SIGNIFICANT CHANGE UP (ref 1.8–8)
NEUTROPHILS NFR BLD AUTO: 82.7 % — HIGH (ref 37–73)
PLATELET # BLD AUTO: 154 K/UL — SIGNIFICANT CHANGE UP (ref 150–400)
POTASSIUM SERPL-MCNC: 4 MMOL/L — SIGNIFICANT CHANGE UP (ref 3.5–5.3)
POTASSIUM SERPL-SCNC: 4 MMOL/L — SIGNIFICANT CHANGE UP (ref 3.5–5.3)
RBC # BLD: 2.97 M/UL — LOW (ref 4.4–5.2)
RBC # FLD: 23.5 % — HIGH (ref 11–15.6)
SODIUM SERPL-SCNC: 142 MMOL/L — SIGNIFICANT CHANGE UP (ref 135–145)
WBC # BLD: 7.4 K/UL — SIGNIFICANT CHANGE UP (ref 4.8–10.8)
WBC # FLD AUTO: 7.4 K/UL — SIGNIFICANT CHANGE UP (ref 4.8–10.8)

## 2017-12-20 RX ADMIN — Medication 500 MILLIGRAM(S): at 05:13

## 2017-12-20 RX ADMIN — Medication 100 MILLIGRAM(S): at 13:42

## 2017-12-20 RX ADMIN — Medication 500 MILLIGRAM(S): at 17:48

## 2017-12-20 RX ADMIN — SODIUM CHLORIDE 3 MILLILITER(S): 9 INJECTION INTRAMUSCULAR; INTRAVENOUS; SUBCUTANEOUS at 05:10

## 2017-12-20 RX ADMIN — Medication 100 MILLIGRAM(S): at 22:03

## 2017-12-20 RX ADMIN — SODIUM CHLORIDE 3 MILLILITER(S): 9 INJECTION INTRAMUSCULAR; INTRAVENOUS; SUBCUTANEOUS at 13:42

## 2017-12-20 RX ADMIN — ENOXAPARIN SODIUM 40 MILLIGRAM(S): 100 INJECTION SUBCUTANEOUS at 11:57

## 2017-12-20 RX ADMIN — ATORVASTATIN CALCIUM 40 MILLIGRAM(S): 80 TABLET, FILM COATED ORAL at 22:03

## 2017-12-20 RX ADMIN — Medication 500 MILLIGRAM(S): at 19:02

## 2017-12-20 RX ADMIN — OXYCODONE AND ACETAMINOPHEN 1 TABLET(S): 5; 325 TABLET ORAL at 20:39

## 2017-12-20 RX ADMIN — OXYCODONE AND ACETAMINOPHEN 1 TABLET(S): 5; 325 TABLET ORAL at 21:00

## 2017-12-20 RX ADMIN — OXYCODONE AND ACETAMINOPHEN 1 TABLET(S): 5; 325 TABLET ORAL at 15:10

## 2017-12-20 RX ADMIN — OXYCODONE AND ACETAMINOPHEN 1 TABLET(S): 5; 325 TABLET ORAL at 01:00

## 2017-12-20 RX ADMIN — OXYCODONE AND ACETAMINOPHEN 1 TABLET(S): 5; 325 TABLET ORAL at 09:21

## 2017-12-20 RX ADMIN — Medication 100 MILLIGRAM(S): at 05:13

## 2017-12-20 RX ADMIN — OXYCODONE AND ACETAMINOPHEN 1 TABLET(S): 5; 325 TABLET ORAL at 19:25

## 2017-12-20 NOTE — PROGRESS NOTE ADULT - SUBJECTIVE AND OBJECTIVE BOX
Patient seen this afternoon with Dr. Jaimes. Reports feeling well.   Admits to tolerating a regular diet, voiding, pain control adequate.   Patient denies passing flatus yet and has been ambulating minimally.   Will consult PT to evaluate. DC pending continued clinical improvement,   flatus and PT consult.

## 2017-12-20 NOTE — PROGRESS NOTE ADULT - ASSESSMENT
68 yo POD#2 s/p SHAWNA BSO PPALND omentectomy, sigmoid resection and anastomosis today. Recovering well. 68 yo POD#2 s/p SHAWNA BSO PPALND omentectomy, sigmoid resection and anastomosis today. Recovering well

## 2017-12-20 NOTE — PROGRESS NOTE ADULT - SUBJECTIVE AND OBJECTIVE BOX
GYNECOLOGIC ONCOLOGY PROGRESS NOTE    POD#2    PROBLEMS:  Anemia  Endometrial cancer      Pt seen and examined at bedside, resting comfortably.  Patient s/p 2U PRBC yesterday, hgb this morning stable.     SUBJECTIVE:    Patient is without complaints.  Pain well-controlled.  Flatus: No  Denies Nausea, Vomiting or Diarrhea.  Denies shortness of breath, chest pain or dyspnea on exertion.  Tolerating diet.    OBJECTIVE:     VITALS:  T(F): 97.9 (12-20-17 @ 08:04), Max: 98.9 (12-19-17 @ 23:30)  HR: 73 (12-20-17 @ 08:04) (73 - 92)  BP: 100/60 (12-20-17 @ 08:04) (79/50 - 105/63)  RR: 18 (12-20-17 @ 08:04) (18 - 18)  SpO2: 90% (12-20-17 @ 08:04) (90% - 92%)    I&O's Summary    19 Dec 2017 07:01  -  20 Dec 2017 07:00  --------------------------------------------------------  IN: 985 mL / OUT: 403 mL / NET: 582 mL        MEDICATIONS  (STANDING):  atorvastatin 40 milliGRAM(s) Oral at bedtime  dextrose 5% + sodium chloride 0.45% with potassium chloride 20 mEq/L 1000 milliLiter(s) (60 mL/Hr) IV Continuous <Continuous>  docusate sodium 100 milliGRAM(s) Oral three times a day  enoxaparin Injectable 40 milliGRAM(s) SubCutaneous daily  gentamicin   IVPB 150 milliGRAM(s) IV Intermittent once  naproxen 500 milliGRAM(s) Oral every 12 hours  sodium chloride 0.9% lock flush 3 milliLiter(s) IV Push every 8 hours    MEDICATIONS  (PRN):  naloxone Injectable 0.1 milliGRAM(s) IV Push every 3 minutes PRN For ANY of the following changes in patient status:  A. RR LESS THAN 10 breaths per minute, B. Oxygen saturation LESS THAN 90%, C. Sedation score of 6  ondansetron Injectable 4 milliGRAM(s) IV Push every 6 hours PRN Nausea  oxyCODONE    5 mG/acetaminophen 325 mG 1 Tablet(s) Oral every 4 hours PRN Moderate Pain (4 - 6)  oxyCODONE    5 mG/acetaminophen 325 mG 2 Tablet(s) Oral every 4 hours PRN Severe Pain (7 - 10)      Physical Exam:  Constitutional: NAD  Pulmonary: clear to auscultation bilaterally   Cardiovascular: Regular rate and rhythm   Abdomen: softly distended, non-tender, normal bowel sounds  Extremities: no lower extremity edema or calve tenderness, Qasim's sign negative.  Incision: Clean, dry, prevena intact.  Without signs of infection or hernia.      LABS:                        10.0   7.4   )-----------( 154      ( 20 Dec 2017 07:11 )             28.9     12-20    142  |  104  |  13.0  ----------------------------<  113  4.0   |  24.0  |  0.78    Ca    8.7      20 Dec 2017 07:11  Phos  3.8     12-19  Mg     1.6     12-19    TPro  5.6<L>  /  Alb  3.5  /  TBili  0.9  /  DBili  x   /  AST  12  /  ALT  7   /  AlkPhos  43  12-19        Patient seen and examined with Dr. Hawkins. GYNECOLOGIC ONCOLOGY PROGRESS NOTE    POD#2    PROBLEMS:  Anemia  Endometrial cancer      Pt seen and examined at bedside, resting comfortably.  Patient s/p 2U PRBC yesterday, hgb this morning stable.     SUBJECTIVE:    Patient is without complaints.  Pain well-controlled.  Flatus: No  Denies Nausea, Vomiting or Diarrhea.  Denies shortness of breath, chest pain or dyspnea on exertion.  Tolerating diet.    OBJECTIVE:     VITALS:  T(F): 97.9 (12-20-17 @ 08:04), Max: 98.9 (12-19-17 @ 23:30)  HR: 73 (12-20-17 @ 08:04) (73 - 92)  BP: 100/60 (12-20-17 @ 08:04) (79/50 - 105/63)  RR: 18 (12-20-17 @ 08:04) (18 - 18)  SpO2: 90% (12-20-17 @ 08:04) (90% - 92%)    I&O's Summary    19 Dec 2017 07:01  -  20 Dec 2017 07:00  --------------------------------------------------------  IN: 985 mL / OUT: 403 mL / NET: 582 mL    MEDICATIONS  (STANDING):  atorvastatin 40 milliGRAM(s) Oral at bedtime  dextrose 5% + sodium chloride 0.45% with potassium chloride 20 mEq/L 1000 milliLiter(s) (60 mL/Hr) IV Continuous <Continuous>  docusate sodium 100 milliGRAM(s) Oral three times a day  enoxaparin Injectable 40 milliGRAM(s) SubCutaneous daily  gentamicin   IVPB 150 milliGRAM(s) IV Intermittent once  naproxen 500 milliGRAM(s) Oral every 12 hours  sodium chloride 0.9% lock flush 3 milliLiter(s) IV Push every 8 hours    MEDICATIONS  (PRN):  naloxone Injectable 0.1 milliGRAM(s) IV Push every 3 minutes PRN For ANY of the following changes in patient status:  A. RR LESS THAN 10 breaths per minute, B. Oxygen saturation LESS THAN 90%, C. Sedation score of 6  ondansetron Injectable 4 milliGRAM(s) IV Push every 6 hours PRN Nausea  oxyCODONE    5 mG/acetaminophen 325 mG 1 Tablet(s) Oral every 4 hours PRN Moderate Pain (4 - 6)  oxyCODONE    5 mG/acetaminophen 325 mG 2 Tablet(s) Oral every 4 hours PRN Severe Pain (7 - 10)      Physical Exam:  Constitutional: NAD  Pulmonary: clear to auscultation bilaterally   Cardiovascular: Regular rate and rhythm   Abdomen: softly distended, non-tender, normal bowel sounds  Extremities: no lower extremity edema or calve tenderness, Qasim's sign negative.  Incision: Clean, dry, prevena intact.  Without signs of infection or hernia.      LABS:                        10.0   7.4   )-----------( 154      ( 20 Dec 2017 07:11 )             28.9     12-20    142  |  104  |  13.0  ----------------------------<  113  4.0   |  24.0  |  0.78    Ca    8.7      20 Dec 2017 07:11  Phos  3.8     12-19  Mg     1.6     12-19    TPro  5.6<L>  /  Alb  3.5  /  TBili  0.9  /  DBili  x   /  AST  12  /  ALT  7   /  AlkPhos  43  12-19        Patient seen and examined with Dr. Hawkins.

## 2017-12-20 NOTE — PROGRESS NOTE ADULT - PROBLEM SELECTOR PLAN 1
Regular diet  Colace TID  OOB as tolerated  Pain control with PO pain medication  DVT prophylaxis with lovenox  Incentive Spirometer.

## 2017-12-21 ENCOUNTER — TRANSCRIPTION ENCOUNTER (OUTPATIENT)
Age: 67
End: 2017-12-21

## 2017-12-21 VITALS
SYSTOLIC BLOOD PRESSURE: 100 MMHG | RESPIRATION RATE: 18 BRPM | DIASTOLIC BLOOD PRESSURE: 62 MMHG | TEMPERATURE: 99 F | OXYGEN SATURATION: 97 % | HEART RATE: 84 BPM

## 2017-12-21 LAB
ANION GAP SERPL CALC-SCNC: 14 MMOL/L — SIGNIFICANT CHANGE UP (ref 5–17)
BASOPHILS # BLD AUTO: 0 K/UL — SIGNIFICANT CHANGE UP (ref 0–0.2)
BASOPHILS NFR BLD AUTO: 0.3 % — SIGNIFICANT CHANGE UP (ref 0–2)
BUN SERPL-MCNC: 12 MG/DL — SIGNIFICANT CHANGE UP (ref 8–20)
CALCIUM SERPL-MCNC: 8.7 MG/DL — SIGNIFICANT CHANGE UP (ref 8.6–10.2)
CHLORIDE SERPL-SCNC: 102 MMOL/L — SIGNIFICANT CHANGE UP (ref 98–107)
CO2 SERPL-SCNC: 24 MMOL/L — SIGNIFICANT CHANGE UP (ref 22–29)
CREAT SERPL-MCNC: 0.66 MG/DL — SIGNIFICANT CHANGE UP (ref 0.5–1.3)
EOSINOPHIL # BLD AUTO: 0.1 K/UL — SIGNIFICANT CHANGE UP (ref 0–0.5)
EOSINOPHIL NFR BLD AUTO: 1.5 % — SIGNIFICANT CHANGE UP (ref 0–6)
GLUCOSE SERPL-MCNC: 90 MG/DL — SIGNIFICANT CHANGE UP (ref 70–115)
HCT VFR BLD CALC: 29.2 % — LOW (ref 37–47)
HGB BLD-MCNC: 9.8 G/DL — LOW (ref 12–16)
LYMPHOCYTES # BLD AUTO: 0.6 K/UL — LOW (ref 1–4.8)
LYMPHOCYTES # BLD AUTO: 8.5 % — LOW (ref 20–55)
MCHC RBC-ENTMCNC: 33.1 PG — HIGH (ref 27–31)
MCHC RBC-ENTMCNC: 33.6 G/DL — SIGNIFICANT CHANGE UP (ref 32–36)
MCV RBC AUTO: 98.6 FL — SIGNIFICANT CHANGE UP (ref 81–99)
MONOCYTES # BLD AUTO: 0.5 K/UL — SIGNIFICANT CHANGE UP (ref 0–0.8)
MONOCYTES NFR BLD AUTO: 7.3 % — SIGNIFICANT CHANGE UP (ref 3–10)
NEUTROPHILS # BLD AUTO: 5.6 K/UL — SIGNIFICANT CHANGE UP (ref 1.8–8)
NEUTROPHILS NFR BLD AUTO: 82.3 % — HIGH (ref 37–73)
PLATELET # BLD AUTO: 155 K/UL — SIGNIFICANT CHANGE UP (ref 150–400)
POTASSIUM SERPL-MCNC: 3.4 MMOL/L — LOW (ref 3.5–5.3)
POTASSIUM SERPL-SCNC: 3.4 MMOL/L — LOW (ref 3.5–5.3)
RBC # BLD: 2.96 M/UL — LOW (ref 4.4–5.2)
RBC # FLD: 22.2 % — HIGH (ref 11–15.6)
SODIUM SERPL-SCNC: 140 MMOL/L — SIGNIFICANT CHANGE UP (ref 135–145)
WBC # BLD: 6.9 K/UL — SIGNIFICANT CHANGE UP (ref 4.8–10.8)
WBC # FLD AUTO: 6.9 K/UL — SIGNIFICANT CHANGE UP (ref 4.8–10.8)

## 2017-12-21 PROCEDURE — 88307 TISSUE EXAM BY PATHOLOGIST: CPT

## 2017-12-21 PROCEDURE — 88305 TISSUE EXAM BY PATHOLOGIST: CPT

## 2017-12-21 PROCEDURE — 86900 BLOOD TYPING SEROLOGIC ABO: CPT

## 2017-12-21 PROCEDURE — 80053 COMPREHEN METABOLIC PANEL: CPT

## 2017-12-21 PROCEDURE — 85027 COMPLETE CBC AUTOMATED: CPT

## 2017-12-21 PROCEDURE — P9016: CPT

## 2017-12-21 PROCEDURE — 88341 IMHCHEM/IMCYTCHM EA ADD ANTB: CPT

## 2017-12-21 PROCEDURE — 86850 RBC ANTIBODY SCREEN: CPT

## 2017-12-21 PROCEDURE — 83605 ASSAY OF LACTIC ACID: CPT

## 2017-12-21 PROCEDURE — 36430 TRANSFUSION BLD/BLD COMPNT: CPT

## 2017-12-21 PROCEDURE — 36415 COLL VENOUS BLD VENIPUNCTURE: CPT

## 2017-12-21 PROCEDURE — 86922 COMPATIBILITY TEST ANTIGLOB: CPT

## 2017-12-21 PROCEDURE — 84100 ASSAY OF PHOSPHORUS: CPT

## 2017-12-21 PROCEDURE — 82962 GLUCOSE BLOOD TEST: CPT

## 2017-12-21 PROCEDURE — 88309 TISSUE EXAM BY PATHOLOGIST: CPT

## 2017-12-21 PROCEDURE — 80048 BASIC METABOLIC PNL TOTAL CA: CPT

## 2017-12-21 PROCEDURE — 83735 ASSAY OF MAGNESIUM: CPT

## 2017-12-21 PROCEDURE — 88342 IMHCHEM/IMCYTCHM 1ST ANTB: CPT

## 2017-12-21 PROCEDURE — 86901 BLOOD TYPING SEROLOGIC RH(D): CPT

## 2017-12-21 RX ORDER — OXYCODONE HYDROCHLORIDE 5 MG/1
1 TABLET ORAL
Qty: 20 | Refills: 0 | OUTPATIENT
Start: 2017-12-21 | End: 2017-12-25

## 2017-12-21 RX ORDER — DOCUSATE SODIUM 100 MG
1 CAPSULE ORAL
Qty: 76 | Refills: 0 | OUTPATIENT
Start: 2017-12-21 | End: 2018-01-27

## 2017-12-21 RX ORDER — DOCUSATE SODIUM 100 MG
1 CAPSULE ORAL
Qty: 114 | Refills: 0 | OUTPATIENT
Start: 2017-12-21 | End: 2018-01-27

## 2017-12-21 RX ORDER — POTASSIUM CHLORIDE 20 MEQ
10 PACKET (EA) ORAL ONCE
Qty: 0 | Refills: 0 | Status: COMPLETED | OUTPATIENT
Start: 2017-12-21 | End: 2017-12-21

## 2017-12-21 RX ADMIN — SODIUM CHLORIDE 3 MILLILITER(S): 9 INJECTION INTRAMUSCULAR; INTRAVENOUS; SUBCUTANEOUS at 05:25

## 2017-12-21 RX ADMIN — OXYCODONE AND ACETAMINOPHEN 1 TABLET(S): 5; 325 TABLET ORAL at 11:00

## 2017-12-21 RX ADMIN — Medication 500 MILLIGRAM(S): at 05:26

## 2017-12-21 RX ADMIN — Medication 10 MILLIEQUIVALENT(S): at 12:17

## 2017-12-21 RX ADMIN — SODIUM CHLORIDE 3 MILLILITER(S): 9 INJECTION INTRAMUSCULAR; INTRAVENOUS; SUBCUTANEOUS at 00:53

## 2017-12-21 RX ADMIN — ENOXAPARIN SODIUM 40 MILLIGRAM(S): 100 INJECTION SUBCUTANEOUS at 12:17

## 2017-12-21 RX ADMIN — OXYCODONE AND ACETAMINOPHEN 1 TABLET(S): 5; 325 TABLET ORAL at 10:11

## 2017-12-21 RX ADMIN — Medication 500 MILLIGRAM(S): at 06:00

## 2017-12-21 RX ADMIN — Medication 100 MILLIGRAM(S): at 05:26

## 2017-12-21 NOTE — PROGRESS NOTE ADULT - PROBLEM SELECTOR PLAN 1
Continue routine post-op care  Ambulate  Continue DASH diet  Lovenox for DVT prophylaxis  Percocet for pain control  Continue colace  Incentive Spirometer  Dc planning per Dr. Jaimes Continue routine post-op care  Ambulate  Continue DASH diet  Lovenox for DVT prophylaxis  Percocet for pain control  Continue colace  Incentive Spirometer  Replete K, F/u AM CBC  Dc planning per Dr. Jaimes Continue routine post-op care  Ambulate  Continue DASH diet  Lovenox for DVT prophylaxis  Percocet for pain control  Continue colace  Incentive Spirometer  Replete K  Dc planning after flatus per Dr. Jaimes

## 2017-12-21 NOTE — DISCHARGE NOTE ADULT - CARE PROVIDER_API CALL
Ivan Jaimes), Gynecologic Oncology; Obstetrics and Gynecology  [address] [place] [address]  Phone: [phone]  Fax: [phone]

## 2017-12-21 NOTE — PROGRESS NOTE ADULT - SUBJECTIVE AND OBJECTIVE BOX
GYNECOLOGIC ONCOLOGY PROGRESS NOTE    POD#3    PROBLEMS:  Anemia  Endometrial cancer  Dyslipidemia  Hernia    Pt seen and examined at bedside.     SUBJECTIVE:    Patient is without complaints.  Pain well-controlled.  Flatus: No  Denies Nausea, Vomiting or Diarrhea.  Denies shortness of breath, chest pain or dyspnea on exertion.  Tolerating diet.    OBJECTIVE:     VITALS:  T(F): 99.2 (12-21-17 @ 08:15), Max: 99.2 (12-21-17 @ 08:15)  HR: 84 (12-21-17 @ 08:15) (77 - 93)  BP: 100/62 (12-21-17 @ 08:15) (87/57 - 102/63)  RR: 18 (12-21-17 @ 08:15) (18 - 18)  SpO2: 97% (12-21-17 @ 08:15) (90% - 97%)      MEDICATIONS  (STANDING):  atorvastatin 40 milliGRAM(s) Oral at bedtime  docusate sodium 100 milliGRAM(s) Oral three times a day  enoxaparin Injectable 40 milliGRAM(s) SubCutaneous daily  gentamicin   IVPB 150 milliGRAM(s) IV Intermittent once  naproxen 500 milliGRAM(s) Oral every 12 hours  sodium chloride 0.9% lock flush 3 milliLiter(s) IV Push every 8 hours    MEDICATIONS  (PRN):  naloxone Injectable 0.1 milliGRAM(s) IV Push every 3 minutes PRN For ANY of the following changes in patient status:  A. RR LESS THAN 10 breaths per minute, B. Oxygen saturation LESS THAN 90%, C. Sedation score of 6  ondansetron Injectable 4 milliGRAM(s) IV Push every 6 hours PRN Nausea  oxyCODONE    5 mG/acetaminophen 325 mG 1 Tablet(s) Oral every 4 hours PRN Moderate Pain (4 - 6)  oxyCODONE    5 mG/acetaminophen 325 mG 2 Tablet(s) Oral every 4 hours PRN Severe Pain (7 - 10)      Physical Exam:  Constitutional: NAD  Pulmonary: Clear to auscultation bilaterally   Cardiovascular: Regular rate and rhythm   Abdomen: soft, non-tender, non-distended, normal bowel sounds.   Extremities: No lower extremity edema or calve tenderness, Qasim's sign negative.  Incision: Clean, dry, intact.  Without signs of infection or hernia.      LABS:                        10.0   7.4   )-----------( 154      ( 20 Dec 2017 07:11 )             28.9     12-21    140  |  102  |  12.0  ----------------------------<  90  3.4<L>   |  24.0  |  0.66    Ca    8.7      21 Dec 2017 07:28          Pt. seen and examined with Dr. Hawkins GYNECOLOGIC ONCOLOGY PROGRESS NOTE    POD#3    PROBLEMS:  Anemia  Endometrial cancer  Dyslipidemia  Hernia    Pt seen and examined at bedside.     SUBJECTIVE:    Patient is without complaints.  Pain well-controlled.  Flatus: No  Denies Nausea, Vomiting or Diarrhea.  Denies shortness of breath, chest pain or dyspnea on exertion.  Tolerating diet.    OBJECTIVE:     VITALS:  T(F): 99.2 (12-21-17 @ 08:15), Max: 99.2 (12-21-17 @ 08:15)  HR: 84 (12-21-17 @ 08:15) (77 - 93)  BP: 100/62 (12-21-17 @ 08:15) (87/57 - 102/63)  RR: 18 (12-21-17 @ 08:15) (18 - 18)  SpO2: 97% (12-21-17 @ 08:15) (90% - 97%)      MEDICATIONS  (STANDING):  atorvastatin 40 milliGRAM(s) Oral at bedtime  docusate sodium 100 milliGRAM(s) Oral three times a day  enoxaparin Injectable 40 milliGRAM(s) SubCutaneous daily  gentamicin   IVPB 150 milliGRAM(s) IV Intermittent once  naproxen 500 milliGRAM(s) Oral every 12 hours  sodium chloride 0.9% lock flush 3 milliLiter(s) IV Push every 8 hours    MEDICATIONS  (PRN):  naloxone Injectable 0.1 milliGRAM(s) IV Push every 3 minutes PRN For ANY of the following changes in patient status:  A. RR LESS THAN 10 breaths per minute, B. Oxygen saturation LESS THAN 90%, C. Sedation score of 6  ondansetron Injectable 4 milliGRAM(s) IV Push every 6 hours PRN Nausea  oxyCODONE    5 mG/acetaminophen 325 mG 1 Tablet(s) Oral every 4 hours PRN Moderate Pain (4 - 6)  oxyCODONE    5 mG/acetaminophen 325 mG 2 Tablet(s) Oral every 4 hours PRN Severe Pain (7 - 10)      Physical Exam:  Constitutional: NAD  Pulmonary: Clear to auscultation bilaterally   Cardiovascular: Regular rate and rhythm   Abdomen: soft, non-tender, non-distended, normal bowel sounds.   Extremities: No lower extremity edema or calve tenderness, Qasim's sign negative.  Incision: Clean, dry, prevena intact.  Without signs of infection or hernia.      LABS:                        10.0   7.4   )-----------( 154      ( 20 Dec 2017 07:11 )             28.9     12-21    140  |  102  |  12.0  ----------------------------<  90  3.4<L>   |  24.0  |  0.66    Ca    8.7      21 Dec 2017 07:28          Pt. seen and examined with Dr. Hawkins

## 2017-12-21 NOTE — PROGRESS NOTE ADULT - ASSESSMENT
66 yo s/p SHAWNA BSO PPALND, omentectomy, sigmoid resection and anastamosis. 68 yo POD#3 s/p SHAWNA BSO PPALND, omentectomy, sigmoid resection and anastamosis.

## 2017-12-21 NOTE — DISCHARGE NOTE ADULT - HOSPITAL COURSE
66 yo s/p SHAWNA BSO PPALND, omentectomy, sigmoid resection and anastamosis. Patient post-operatively had an uncomplicated hospital course. Her pain was well controlled. She is tolerating a regular diet. She is ambulating independently. She was able to void after removal of silveira. Patient with flatus. Labs and Vitals WNL upon discharge.

## 2017-12-21 NOTE — DISCHARGE NOTE ADULT - CARE PLAN
Principal Discharge DX:	Endometrial cancer  Goal:	improve condition  Instructions for follow-up, activity and diet:	Please follow-up with PA in one week for post-op visit/removal of staples  Follow-up with Dr. Jaimes in two weeks to review pathology report.

## 2017-12-21 NOTE — DISCHARGE NOTE ADULT - PLAN OF CARE
improve condition Please follow-up with PA in one week for post-op visit/removal of staples  Follow-up with Dr. Jaimes in two weeks to review pathology report.

## 2017-12-21 NOTE — DISCHARGE NOTE ADULT - MEDICATION SUMMARY - MEDICATIONS TO TAKE
I will START or STAY ON the medications listed below when I get home from the hospital:    acetaminophen-oxycodone 325 mg-5 mg oral tablet  -- 1 tab(s) by mouth every 6 hours, As Needed -for severe pain MDD:4 tablets  -- Caution federal law prohibits the transfer of this drug to any person other  than the person for whom it was prescribed.  May cause drowsiness.  Alcohol may intensify this effect.  Use care when operating dangerous machinery.  This prescription cannot be refilled.  This product contains acetaminophen.  Do not use  with any other product containing acetaminophen to prevent possible liver damage.  Using more of this medication than prescribed may cause serious breathing problems.    -- Indication: For pain    naproxen 500 mg oral tablet  -- 1 tab(s) by mouth every 12 hours  -- Indication: For inflammation/pain    Lovenox 40 mg/0.4 mL injectable solution  -- 40 milligram(s) subcutaneously once a day   -- It is very important that you take or use this exactly as directed.  Do not skip doses or discontinue unless directed by your doctor.    -- Indication: For DVT prophylaxis    Crestor 10 mg oral tablet  -- 1 tab(s) by mouth once a day (at bedtime)  -- Indication: For per PMD    Colace 100 mg oral capsule  -- 1 cap(s) by mouth 3 times a day MDD:3 tabs  -- Medication should be taken with plenty of water.    -- Indication: For per Dr. Jaimes

## 2017-12-21 NOTE — DISCHARGE NOTE ADULT - PATIENT PORTAL LINK FT
“You can access the Follow[facility] Patient Portal, offered by [facility], by registering with the following website: [email or web address]”

## 2017-12-22 LAB — SURGICAL PATHOLOGY FINAL REPORT - CH: SIGNIFICANT CHANGE UP

## 2017-12-27 LAB — PATH REPORT ADDENDUM.SYNOPTIC DOC: SIGNIFICANT CHANGE UP

## 2018-01-16 ENCOUNTER — OUTPATIENT (OUTPATIENT)
Dept: OUTPATIENT SERVICES | Facility: HOSPITAL | Age: 68
LOS: 1 days | Discharge: HOME | End: 2018-01-16

## 2018-01-16 DIAGNOSIS — C54.1 MALIGNANT NEOPLASM OF ENDOMETRIUM: ICD-10-CM

## 2018-01-16 DIAGNOSIS — C55 MALIGNANT NEOPLASM OF UTERUS, PART UNSPECIFIED: ICD-10-CM

## 2018-01-18 ENCOUNTER — APPOINTMENT (OUTPATIENT)
Dept: HEMATOLOGY ONCOLOGY | Facility: CLINIC | Age: 68
End: 2018-01-18
Payer: MEDICARE

## 2018-01-18 VITALS
OXYGEN SATURATION: 96 % | HEART RATE: 86 BPM | BODY MASS INDEX: 24.95 KG/M2 | TEMPERATURE: 98.7 F | WEIGHT: 147.93 LBS | SYSTOLIC BLOOD PRESSURE: 112 MMHG | DIASTOLIC BLOOD PRESSURE: 72 MMHG

## 2018-01-18 PROCEDURE — 99214 OFFICE O/P EST MOD 30 MIN: CPT

## 2018-01-25 ENCOUNTER — RESULT REVIEW (OUTPATIENT)
Age: 68
End: 2018-01-25

## 2018-01-25 ENCOUNTER — LABORATORY RESULT (OUTPATIENT)
Age: 68
End: 2018-01-25

## 2018-01-25 ENCOUNTER — APPOINTMENT (OUTPATIENT)
Dept: INFUSION THERAPY | Facility: CLINIC | Age: 68
End: 2018-01-25

## 2018-01-25 LAB
BASOPHILS # BLD AUTO: 0 K/UL — SIGNIFICANT CHANGE UP (ref 0–0.2)
BASOPHILS NFR BLD AUTO: 0.2 % — SIGNIFICANT CHANGE UP (ref 0–2)
EOSINOPHIL # BLD AUTO: 0 K/UL — SIGNIFICANT CHANGE UP (ref 0–0.5)
EOSINOPHIL NFR BLD AUTO: 0.4 % — SIGNIFICANT CHANGE UP (ref 0–6)
HCT VFR BLD CALC: 38.1 % — SIGNIFICANT CHANGE UP (ref 34.5–45)
HGB BLD-MCNC: 14.6 G/DL — SIGNIFICANT CHANGE UP (ref 11.5–15.5)
LYMPHOCYTES # BLD AUTO: 0.6 K/UL — LOW (ref 1–3.3)
LYMPHOCYTES # BLD AUTO: 4.9 % — LOW (ref 13–44)
MCHC RBC-ENTMCNC: 35.4 PG — HIGH (ref 27–34)
MCHC RBC-ENTMCNC: 38.4 GM/DL — HIGH (ref 32–36)
MCV RBC AUTO: 92.2 FL — SIGNIFICANT CHANGE UP (ref 80–100)
MONOCYTES # BLD AUTO: 0.1 K/UL — SIGNIFICANT CHANGE UP (ref 0–0.9)
MONOCYTES NFR BLD AUTO: 0.9 % — LOW (ref 2–14)
NEUTROPHILS # BLD AUTO: 10.8 K/UL — HIGH (ref 1.8–7.4)
NEUTROPHILS NFR BLD AUTO: 93.6 % — HIGH (ref 43–77)
PLATELET # BLD AUTO: 223 K/UL — SIGNIFICANT CHANGE UP (ref 150–400)
RBC # BLD: 4.13 M/UL — SIGNIFICANT CHANGE UP (ref 3.8–5.2)
RBC # FLD: 13.1 % — SIGNIFICANT CHANGE UP (ref 10.3–14.5)
WBC # BLD: 11.5 K/UL — HIGH (ref 3.8–10.5)
WBC # FLD AUTO: 11.5 K/UL — HIGH (ref 3.8–10.5)

## 2018-01-26 ENCOUNTER — OTHER (OUTPATIENT)
Age: 68
End: 2018-01-26

## 2018-01-26 DIAGNOSIS — Z51.11 ENCOUNTER FOR ANTINEOPLASTIC CHEMOTHERAPY: ICD-10-CM

## 2018-01-26 DIAGNOSIS — R11.2 NAUSEA WITH VOMITING, UNSPECIFIED: ICD-10-CM

## 2018-02-13 ENCOUNTER — RESULT REVIEW (OUTPATIENT)
Age: 68
End: 2018-02-13

## 2018-02-13 ENCOUNTER — APPOINTMENT (OUTPATIENT)
Dept: HEMATOLOGY ONCOLOGY | Facility: CLINIC | Age: 68
End: 2018-02-13
Payer: MEDICARE

## 2018-02-13 VITALS
DIASTOLIC BLOOD PRESSURE: 71 MMHG | SYSTOLIC BLOOD PRESSURE: 128 MMHG | WEIGHT: 150.99 LBS | BODY MASS INDEX: 25.46 KG/M2 | HEART RATE: 79 BPM | OXYGEN SATURATION: 97 % | TEMPERATURE: 98.4 F

## 2018-02-13 LAB
BASOPHILS # BLD AUTO: 0 K/UL — SIGNIFICANT CHANGE UP (ref 0–0.2)
BASOPHILS NFR BLD AUTO: 0.7 % — SIGNIFICANT CHANGE UP (ref 0–2)
EOSINOPHIL # BLD AUTO: 0 K/UL — SIGNIFICANT CHANGE UP (ref 0–0.5)
EOSINOPHIL NFR BLD AUTO: 0.8 % — SIGNIFICANT CHANGE UP (ref 0–6)
HCT VFR BLD CALC: 29.2 % — LOW (ref 34.5–45)
HGB BLD-MCNC: 11.1 G/DL — LOW (ref 11.5–15.5)
LYMPHOCYTES # BLD AUTO: 1.4 K/UL — SIGNIFICANT CHANGE UP (ref 1–3.3)
LYMPHOCYTES # BLD AUTO: 30.6 % — SIGNIFICANT CHANGE UP (ref 13–44)
MCHC RBC-ENTMCNC: 35.3 PG — HIGH (ref 27–34)
MCHC RBC-ENTMCNC: 38.1 GM/DL — HIGH (ref 32–36)
MCV RBC AUTO: 92.7 FL — SIGNIFICANT CHANGE UP (ref 80–100)
MONOCYTES # BLD AUTO: 0.3 K/UL — SIGNIFICANT CHANGE UP (ref 0–0.9)
MONOCYTES NFR BLD AUTO: 5.9 % — SIGNIFICANT CHANGE UP (ref 2–14)
NEUTROPHILS # BLD AUTO: 2.8 K/UL — SIGNIFICANT CHANGE UP (ref 1.8–7.4)
NEUTROPHILS NFR BLD AUTO: 61.9 % — SIGNIFICANT CHANGE UP (ref 43–77)
PLATELET # BLD AUTO: 64 K/UL — LOW (ref 150–400)
RBC # BLD: 3.15 M/UL — LOW (ref 3.8–5.2)
RBC # FLD: 14.1 % — SIGNIFICANT CHANGE UP (ref 10.3–14.5)
WBC # BLD: 4.5 K/UL — SIGNIFICANT CHANGE UP (ref 3.8–10.5)
WBC # FLD AUTO: 4.5 K/UL — SIGNIFICANT CHANGE UP (ref 3.8–10.5)

## 2018-02-13 PROCEDURE — 99214 OFFICE O/P EST MOD 30 MIN: CPT

## 2018-02-13 RX ORDER — POTASSIUM CHLORIDE 1500 MG/1
20 TABLET, FILM COATED, EXTENDED RELEASE ORAL
Qty: 10 | Refills: 0 | Status: DISCONTINUED | COMMUNITY
Start: 2017-10-13 | End: 2018-02-13

## 2018-02-13 RX ORDER — MAGNESIUM OXIDE 241.3 MG/1000MG
400 TABLET ORAL DAILY
Qty: 30 | Refills: 0 | Status: DISCONTINUED | COMMUNITY
Start: 2017-11-07 | End: 2018-02-13

## 2018-02-14 LAB
ALBUMIN SERPL ELPH-MCNC: 4.4 G/DL
ALP BLD-CCNC: 71 U/L
ALT SERPL-CCNC: 17 U/L
ANION GAP SERPL CALC-SCNC: 16 MMOL/L
AST SERPL-CCNC: 15 U/L
BILIRUB SERPL-MCNC: 0.7 MG/DL
BUN SERPL-MCNC: 21 MG/DL
CALCIUM SERPL-MCNC: 9.7 MG/DL
CHLORIDE SERPL-SCNC: 101 MMOL/L
CO2 SERPL-SCNC: 25 MMOL/L
CREAT SERPL-MCNC: 0.83 MG/DL
POTASSIUM SERPL-SCNC: 3.5 MMOL/L
PROT SERPL-MCNC: 7.1 G/DL
SODIUM SERPL-SCNC: 142 MMOL/L

## 2018-02-20 ENCOUNTER — OUTPATIENT (OUTPATIENT)
Dept: OUTPATIENT SERVICES | Facility: HOSPITAL | Age: 68
LOS: 1 days | Discharge: HOME | End: 2018-02-20

## 2018-02-20 DIAGNOSIS — C54.1 MALIGNANT NEOPLASM OF ENDOMETRIUM: ICD-10-CM

## 2018-02-20 DIAGNOSIS — C55 MALIGNANT NEOPLASM OF UTERUS, PART UNSPECIFIED: ICD-10-CM

## 2018-02-23 ENCOUNTER — RESULT REVIEW (OUTPATIENT)
Age: 68
End: 2018-02-23

## 2018-02-23 ENCOUNTER — APPOINTMENT (OUTPATIENT)
Dept: INFUSION THERAPY | Facility: CLINIC | Age: 68
End: 2018-02-23

## 2018-02-23 ENCOUNTER — LABORATORY RESULT (OUTPATIENT)
Age: 68
End: 2018-02-23

## 2018-02-23 LAB
BASOPHILS # BLD AUTO: 0 K/UL — SIGNIFICANT CHANGE UP (ref 0–0.2)
BASOPHILS NFR BLD AUTO: 0.2 % — SIGNIFICANT CHANGE UP (ref 0–2)
EOSINOPHIL # BLD AUTO: 0.1 K/UL — SIGNIFICANT CHANGE UP (ref 0–0.5)
EOSINOPHIL NFR BLD AUTO: 1.3 % — SIGNIFICANT CHANGE UP (ref 0–6)
HCT VFR BLD CALC: 29.6 % — LOW (ref 34.5–45)
HGB BLD-MCNC: 11.3 G/DL — LOW (ref 11.5–15.5)
LYMPHOCYTES # BLD AUTO: 0.6 K/UL — LOW (ref 1–3.3)
LYMPHOCYTES # BLD AUTO: 9.2 % — LOW (ref 13–44)
MCHC RBC-ENTMCNC: 34.5 PG — HIGH (ref 27–34)
MCHC RBC-ENTMCNC: 38.1 GM/DL — HIGH (ref 32–36)
MCV RBC AUTO: 90.6 FL — SIGNIFICANT CHANGE UP (ref 80–100)
MONOCYTES # BLD AUTO: 0.1 K/UL — SIGNIFICANT CHANGE UP (ref 0–0.9)
MONOCYTES NFR BLD AUTO: 2 % — SIGNIFICANT CHANGE UP (ref 2–14)
NEUTROPHILS # BLD AUTO: 5.5 K/UL — SIGNIFICANT CHANGE UP (ref 1.8–7.4)
NEUTROPHILS NFR BLD AUTO: 87.3 % — HIGH (ref 43–77)
PLATELET # BLD AUTO: 158 K/UL — SIGNIFICANT CHANGE UP (ref 150–400)
RBC # BLD: 3.26 M/UL — LOW (ref 3.8–5.2)
RBC # FLD: 16.1 % — HIGH (ref 10.3–14.5)
WBC # BLD: 6.3 K/UL — SIGNIFICANT CHANGE UP (ref 3.8–10.5)
WBC # FLD AUTO: 6.3 K/UL — SIGNIFICANT CHANGE UP (ref 3.8–10.5)

## 2018-02-26 ENCOUNTER — OUTPATIENT (OUTPATIENT)
Dept: OUTPATIENT SERVICES | Facility: HOSPITAL | Age: 68
LOS: 1 days | Discharge: HOME | End: 2018-02-26

## 2018-02-26 DIAGNOSIS — Z51.11 ENCOUNTER FOR ANTINEOPLASTIC CHEMOTHERAPY: ICD-10-CM

## 2018-02-26 DIAGNOSIS — R11.2 NAUSEA WITH VOMITING, UNSPECIFIED: ICD-10-CM

## 2018-02-27 ENCOUNTER — APPOINTMENT (OUTPATIENT)
Dept: RADIATION ONCOLOGY | Facility: CLINIC | Age: 68
End: 2018-02-27
Payer: MEDICARE

## 2018-02-27 VITALS
RESPIRATION RATE: 16 BRPM | SYSTOLIC BLOOD PRESSURE: 102 MMHG | TEMPERATURE: 98.6 F | HEART RATE: 100 BPM | HEIGHT: 64 IN | BODY MASS INDEX: 24.92 KG/M2 | DIASTOLIC BLOOD PRESSURE: 67 MMHG | OXYGEN SATURATION: 100 % | WEIGHT: 146 LBS

## 2018-02-27 DIAGNOSIS — Z80.41 FAMILY HISTORY OF MALIGNANT NEOPLASM OF OVARY: ICD-10-CM

## 2018-02-27 DIAGNOSIS — Z85.42 PERSONAL HISTORY OF MALIGNANT NEOPLASM OF OTHER PARTS OF UTERUS: ICD-10-CM

## 2018-02-27 PROCEDURE — 99204 OFFICE O/P NEW MOD 45 MIN: CPT | Mod: 25

## 2018-03-06 ENCOUNTER — RESULT REVIEW (OUTPATIENT)
Age: 68
End: 2018-03-06

## 2018-03-06 PROCEDURE — 88321 CONSLTJ&REPRT SLD PREP ELSWR: CPT

## 2018-03-08 ENCOUNTER — RESULT REVIEW (OUTPATIENT)
Age: 68
End: 2018-03-08

## 2018-03-08 ENCOUNTER — APPOINTMENT (OUTPATIENT)
Dept: HEMATOLOGY ONCOLOGY | Facility: CLINIC | Age: 68
End: 2018-03-08
Payer: MEDICARE

## 2018-03-08 VITALS
HEART RATE: 91 BPM | WEIGHT: 151.1 LBS | OXYGEN SATURATION: 100 % | SYSTOLIC BLOOD PRESSURE: 113 MMHG | TEMPERATURE: 98.5 F | DIASTOLIC BLOOD PRESSURE: 68 MMHG | BODY MASS INDEX: 25.94 KG/M2

## 2018-03-08 LAB
ANISOCYTOSIS BLD QL: SLIGHT — SIGNIFICANT CHANGE UP
BASOPHILS # BLD AUTO: 0 K/UL — SIGNIFICANT CHANGE UP (ref 0–0.2)
ELLIPTOCYTES BLD QL SMEAR: SLIGHT — SIGNIFICANT CHANGE UP
EOSINOPHIL # BLD AUTO: 0 K/UL — SIGNIFICANT CHANGE UP (ref 0–0.5)
HCT VFR BLD CALC: 20.5 % — CRITICAL LOW (ref 34.5–45)
HGB BLD-MCNC: 8.1 G/DL — LOW (ref 11.5–15.5)
LYMPHOCYTES # BLD AUTO: 1.2 K/UL — SIGNIFICANT CHANGE UP (ref 1–3.3)
LYMPHOCYTES # BLD AUTO: 60 % — HIGH (ref 13–44)
LYMPHOCYTES # SPEC AUTO: 1 % — HIGH (ref 0–0)
MACROCYTES BLD QL: SLIGHT — SIGNIFICANT CHANGE UP
MCHC RBC-ENTMCNC: 35.5 PG — HIGH (ref 27–34)
MCHC RBC-ENTMCNC: 39.6 GM/DL — HIGH (ref 32–36)
MCV RBC AUTO: 89.6 FL — SIGNIFICANT CHANGE UP (ref 80–100)
MICROCYTES BLD QL: SLIGHT — SIGNIFICANT CHANGE UP
MONOCYTES # BLD AUTO: 0.3 K/UL — SIGNIFICANT CHANGE UP (ref 0–0.9)
MONOCYTES NFR BLD AUTO: 18 % — HIGH (ref 2–14)
NEUTROPHILS # BLD AUTO: 0.5 K/UL — LOW (ref 1.8–7.4)
NEUTROPHILS NFR BLD AUTO: 21 % — LOW (ref 43–77)
NRBC # BLD: 1 /100 — HIGH (ref 0–0)
OVALOCYTES BLD QL SMEAR: SLIGHT — SIGNIFICANT CHANGE UP
PLAT MORPH BLD: NORMAL — SIGNIFICANT CHANGE UP
PLATELET # BLD AUTO: 77 K/UL — LOW (ref 150–400)
POIKILOCYTOSIS BLD QL AUTO: SLIGHT — SIGNIFICANT CHANGE UP
RBC # BLD: 2.29 M/UL — LOW (ref 3.8–5.2)
RBC # FLD: 16.2 % — HIGH (ref 10.3–14.5)
RBC BLD AUTO: ABNORMAL
WBC # BLD: 2 K/UL — LOW (ref 3.8–10.5)
WBC # FLD AUTO: 2 K/UL — LOW (ref 3.8–10.5)

## 2018-03-08 PROCEDURE — 99214 OFFICE O/P EST MOD 30 MIN: CPT

## 2018-03-22 ENCOUNTER — OUTPATIENT (OUTPATIENT)
Dept: OUTPATIENT SERVICES | Facility: HOSPITAL | Age: 68
LOS: 1 days | Discharge: HOME | End: 2018-03-22

## 2018-03-22 DIAGNOSIS — C55 MALIGNANT NEOPLASM OF UTERUS, PART UNSPECIFIED: ICD-10-CM

## 2018-03-22 DIAGNOSIS — C54.1 MALIGNANT NEOPLASM OF ENDOMETRIUM: ICD-10-CM

## 2018-03-23 ENCOUNTER — APPOINTMENT (OUTPATIENT)
Dept: HEMATOLOGY ONCOLOGY | Facility: CLINIC | Age: 68
End: 2018-03-23
Payer: MEDICARE

## 2018-03-23 ENCOUNTER — RESULT REVIEW (OUTPATIENT)
Age: 68
End: 2018-03-23

## 2018-03-23 ENCOUNTER — OUTPATIENT (OUTPATIENT)
Dept: OUTPATIENT SERVICES | Facility: HOSPITAL | Age: 68
LOS: 1 days | End: 2018-03-23
Payer: MEDICARE

## 2018-03-23 VITALS
HEIGHT: 64 IN | BODY MASS INDEX: 26.01 KG/M2 | SYSTOLIC BLOOD PRESSURE: 98 MMHG | HEART RATE: 84 BPM | DIASTOLIC BLOOD PRESSURE: 63 MMHG | WEIGHT: 152.34 LBS | OXYGEN SATURATION: 99 % | TEMPERATURE: 97.9 F

## 2018-03-23 DIAGNOSIS — Z00.8 ENCOUNTER FOR OTHER GENERAL EXAMINATION: ICD-10-CM

## 2018-03-23 LAB
BASOPHILS # BLD AUTO: 0.1 K/UL — SIGNIFICANT CHANGE UP (ref 0–0.2)
BASOPHILS NFR BLD AUTO: 1 % — SIGNIFICANT CHANGE UP (ref 0–2)
EOSINOPHIL # BLD AUTO: 0.1 K/UL — SIGNIFICANT CHANGE UP (ref 0–0.5)
EOSINOPHIL NFR BLD AUTO: 0.9 % — SIGNIFICANT CHANGE UP (ref 0–6)
HCT VFR BLD CALC: 19.6 % — CRITICAL LOW (ref 34.5–45)
HGB BLD-MCNC: 7.4 G/DL — LOW (ref 11.5–15.5)
LYMPHOCYTES # BLD AUTO: 2.1 K/UL — SIGNIFICANT CHANGE UP (ref 1–3.3)
LYMPHOCYTES # BLD AUTO: 27.1 % — SIGNIFICANT CHANGE UP (ref 13–44)
MCHC RBC-ENTMCNC: 37.7 PG — HIGH (ref 27–34)
MCHC RBC-ENTMCNC: 37.8 GM/DL — HIGH (ref 32–36)
MCV RBC AUTO: 100 FL — SIGNIFICANT CHANGE UP (ref 80–100)
MONOCYTES # BLD AUTO: 0.5 K/UL — SIGNIFICANT CHANGE UP (ref 0–0.9)
MONOCYTES NFR BLD AUTO: 6 % — SIGNIFICANT CHANGE UP (ref 2–14)
NEUTROPHILS # BLD AUTO: 5.2 K/UL — SIGNIFICANT CHANGE UP (ref 1.8–7.4)
NEUTROPHILS NFR BLD AUTO: 65.1 % — SIGNIFICANT CHANGE UP (ref 43–77)
PLATELET # BLD AUTO: 80 K/UL — LOW (ref 150–400)
RBC # BLD: 1.96 M/UL — LOW (ref 3.8–5.2)
RBC # FLD: 22.4 % — HIGH (ref 10.3–14.5)
WBC # BLD: 7.9 K/UL — SIGNIFICANT CHANGE UP (ref 3.8–10.5)
WBC # FLD AUTO: 7.9 K/UL — SIGNIFICANT CHANGE UP (ref 3.8–10.5)

## 2018-03-23 PROCEDURE — 99214 OFFICE O/P EST MOD 30 MIN: CPT

## 2018-03-23 RX ORDER — ACETAMINOPHEN 500 MG
650 TABLET ORAL ONCE
Qty: 0 | Refills: 0 | Status: COMPLETED | OUTPATIENT
Start: 2018-03-24 | End: 2018-03-24

## 2018-03-24 ENCOUNTER — OUTPATIENT (OUTPATIENT)
Dept: OUTPATIENT SERVICES | Facility: HOSPITAL | Age: 68
LOS: 1 days | End: 2018-03-24
Payer: MEDICARE

## 2018-03-24 VITALS
TEMPERATURE: 99 F | RESPIRATION RATE: 18 BRPM | DIASTOLIC BLOOD PRESSURE: 61 MMHG | HEART RATE: 79 BPM | OXYGEN SATURATION: 98 % | SYSTOLIC BLOOD PRESSURE: 131 MMHG

## 2018-03-24 VITALS
HEART RATE: 74 BPM | SYSTOLIC BLOOD PRESSURE: 98 MMHG | TEMPERATURE: 98 F | OXYGEN SATURATION: 96 % | DIASTOLIC BLOOD PRESSURE: 58 MMHG | RESPIRATION RATE: 18 BRPM

## 2018-03-24 DIAGNOSIS — D64.89 OTHER SPECIFIED ANEMIAS: ICD-10-CM

## 2018-03-24 PROCEDURE — 86901 BLOOD TYPING SEROLOGIC RH(D): CPT

## 2018-03-24 PROCEDURE — 36415 COLL VENOUS BLD VENIPUNCTURE: CPT

## 2018-03-24 PROCEDURE — 86922 COMPATIBILITY TEST ANTIGLOB: CPT

## 2018-03-24 PROCEDURE — 86850 RBC ANTIBODY SCREEN: CPT

## 2018-03-24 PROCEDURE — P9040: CPT

## 2018-03-24 PROCEDURE — 86900 BLOOD TYPING SEROLOGIC ABO: CPT

## 2018-03-24 PROCEDURE — 36430 TRANSFUSION BLD/BLD COMPNT: CPT

## 2018-03-24 RX ADMIN — Medication 650 MILLIGRAM(S): at 10:48

## 2018-03-26 ENCOUNTER — APPOINTMENT (OUTPATIENT)
Dept: INFUSION THERAPY | Facility: CLINIC | Age: 68
End: 2018-03-26

## 2018-03-29 ENCOUNTER — APPOINTMENT (OUTPATIENT)
Dept: NUCLEAR MEDICINE | Facility: CLINIC | Age: 68
End: 2018-03-29
Payer: MEDICARE

## 2018-03-29 ENCOUNTER — OUTPATIENT (OUTPATIENT)
Dept: OUTPATIENT SERVICES | Facility: HOSPITAL | Age: 68
LOS: 1 days | End: 2018-03-29

## 2018-03-29 DIAGNOSIS — Z00.8 ENCOUNTER FOR OTHER GENERAL EXAMINATION: ICD-10-CM

## 2018-03-29 PROCEDURE — 78815 PET IMAGE W/CT SKULL-THIGH: CPT | Mod: 26,PS

## 2018-04-04 PROCEDURE — 77263 THER RADIOLOGY TX PLNG CPLX: CPT

## 2018-04-19 ENCOUNTER — RESULT REVIEW (OUTPATIENT)
Age: 68
End: 2018-04-19

## 2018-04-19 ENCOUNTER — APPOINTMENT (OUTPATIENT)
Dept: HEMATOLOGY ONCOLOGY | Facility: CLINIC | Age: 68
End: 2018-04-19
Payer: MEDICARE

## 2018-04-19 VITALS
DIASTOLIC BLOOD PRESSURE: 70 MMHG | OXYGEN SATURATION: 91 % | TEMPERATURE: 98.6 F | BODY MASS INDEX: 26.72 KG/M2 | WEIGHT: 156.53 LBS | HEART RATE: 81 BPM | HEIGHT: 64 IN | SYSTOLIC BLOOD PRESSURE: 123 MMHG

## 2018-04-19 LAB
BASOPHILS # BLD AUTO: 0 K/UL — SIGNIFICANT CHANGE UP (ref 0–0.2)
BASOPHILS NFR BLD AUTO: 0.8 % — SIGNIFICANT CHANGE UP (ref 0–2)
EOSINOPHIL # BLD AUTO: 0.2 K/UL — SIGNIFICANT CHANGE UP (ref 0–0.5)
EOSINOPHIL NFR BLD AUTO: 2.8 % — SIGNIFICANT CHANGE UP (ref 0–6)
HCT VFR BLD CALC: 33 % — LOW (ref 34.5–45)
HGB BLD-MCNC: 12.1 G/DL — SIGNIFICANT CHANGE UP (ref 11.5–15.5)
LYMPHOCYTES # BLD AUTO: 1.3 K/UL — SIGNIFICANT CHANGE UP (ref 1–3.3)
LYMPHOCYTES # BLD AUTO: 25.2 % — SIGNIFICANT CHANGE UP (ref 13–44)
MACROCYTES BLD QL: SLIGHT — SIGNIFICANT CHANGE UP
MCHC RBC-ENTMCNC: 36.8 GM/DL — HIGH (ref 32–36)
MCHC RBC-ENTMCNC: 37.2 PG — HIGH (ref 27–34)
MCV RBC AUTO: 101.2 FL — HIGH (ref 80–100)
MONOCYTES # BLD AUTO: 0.4 K/UL — SIGNIFICANT CHANGE UP (ref 0–0.9)
MONOCYTES NFR BLD AUTO: 8.1 % — SIGNIFICANT CHANGE UP (ref 2–14)
NEUTROPHILS # BLD AUTO: 3.4 K/UL — SIGNIFICANT CHANGE UP (ref 1.8–7.4)
NEUTROPHILS NFR BLD AUTO: 63.1 % — SIGNIFICANT CHANGE UP (ref 43–77)
PLAT MORPH BLD: NORMAL — SIGNIFICANT CHANGE UP
PLATELET # BLD AUTO: 160 K/UL — SIGNIFICANT CHANGE UP (ref 150–400)
POLYCHROMASIA BLD QL SMEAR: SLIGHT — SIGNIFICANT CHANGE UP
RBC # BLD: 3.26 M/UL — LOW (ref 3.8–5.2)
RBC # FLD: 16.2 % — HIGH (ref 10.3–14.5)
RBC BLD AUTO: SIGNIFICANT CHANGE UP
WBC # BLD: 5.3 K/UL — SIGNIFICANT CHANGE UP (ref 3.8–10.5)
WBC # FLD AUTO: 5.3 K/UL — SIGNIFICANT CHANGE UP (ref 3.8–10.5)

## 2018-04-19 PROCEDURE — 99214 OFFICE O/P EST MOD 30 MIN: CPT

## 2018-04-19 RX ORDER — NAPROXEN 500 MG/1
500 TABLET ORAL
Qty: 6 | Refills: 0 | Status: DISCONTINUED | COMMUNITY
Start: 2017-12-21

## 2018-04-19 RX ORDER — METRONIDAZOLE 500 MG/1
500 TABLET ORAL
Qty: 3 | Refills: 0 | Status: DISCONTINUED | COMMUNITY
Start: 2017-12-01

## 2018-04-19 RX ORDER — ENOXAPARIN SODIUM 100 MG/ML
40 INJECTION SUBCUTANEOUS
Qty: 112 | Refills: 0 | Status: DISCONTINUED | COMMUNITY
Start: 2017-12-18

## 2018-04-19 RX ORDER — NEOMYCIN SULFATE 500 MG/1
500 TABLET ORAL
Qty: 6 | Refills: 0 | Status: DISCONTINUED | COMMUNITY
Start: 2017-12-01

## 2018-04-19 RX ORDER — CHLORHEXIDINE GLUCONATE 4 %
400 (240 MG) LIQUID (ML) TOPICAL
Qty: 30 | Refills: 0 | Status: DISCONTINUED | COMMUNITY
Start: 2017-11-07

## 2018-04-19 RX ORDER — DEXAMETHASONE 4 MG/1
4 TABLET ORAL
Qty: 120 | Refills: 0 | Status: DISCONTINUED | COMMUNITY
Start: 2017-07-07 | End: 2018-04-19

## 2018-04-19 RX ORDER — OXYCODONE AND ACETAMINOPHEN 5; 325 MG/1; MG/1
5-325 TABLET ORAL
Qty: 20 | Refills: 0 | Status: DISCONTINUED | COMMUNITY
Start: 2017-12-21

## 2018-04-27 ENCOUNTER — APPOINTMENT (OUTPATIENT)
Dept: GASTROENTEROLOGY | Facility: CLINIC | Age: 68
End: 2018-04-27
Payer: MEDICARE

## 2018-04-27 VITALS
SYSTOLIC BLOOD PRESSURE: 126 MMHG | WEIGHT: 158 LBS | HEART RATE: 80 BPM | BODY MASS INDEX: 26.98 KG/M2 | HEIGHT: 64 IN | DIASTOLIC BLOOD PRESSURE: 87 MMHG

## 2018-04-27 DIAGNOSIS — D61.810 ANTINEOPLASTIC CHEMOTHERAPY INDUCED PANCYTOPENIA: ICD-10-CM

## 2018-04-27 DIAGNOSIS — Z86.39 PERSONAL HISTORY OF OTHER ENDOCRINE, NUTRITIONAL AND METABOLIC DISEASE: ICD-10-CM

## 2018-04-27 DIAGNOSIS — Z12.11 ENCOUNTER FOR SCREENING FOR MALIGNANT NEOPLASM OF COLON: ICD-10-CM

## 2018-04-27 PROCEDURE — 99203 OFFICE O/P NEW LOW 30 MIN: CPT

## 2018-06-28 ENCOUNTER — APPOINTMENT (OUTPATIENT)
Dept: NUCLEAR MEDICINE | Facility: CLINIC | Age: 68
End: 2018-06-28
Payer: MEDICARE

## 2018-06-28 ENCOUNTER — OUTPATIENT (OUTPATIENT)
Dept: OUTPATIENT SERVICES | Facility: HOSPITAL | Age: 68
LOS: 1 days | End: 2018-06-28

## 2018-06-28 DIAGNOSIS — Z00.8 ENCOUNTER FOR OTHER GENERAL EXAMINATION: ICD-10-CM

## 2018-06-28 PROCEDURE — 78815 PET IMAGE W/CT SKULL-THIGH: CPT | Mod: 26,KX,PS

## 2018-07-09 ENCOUNTER — OUTPATIENT (OUTPATIENT)
Dept: OUTPATIENT SERVICES | Facility: HOSPITAL | Age: 68
LOS: 1 days | Discharge: HOME | End: 2018-07-09

## 2018-07-09 DIAGNOSIS — C55 MALIGNANT NEOPLASM OF UTERUS, PART UNSPECIFIED: ICD-10-CM

## 2018-07-10 ENCOUNTER — APPOINTMENT (OUTPATIENT)
Dept: RADIATION ONCOLOGY | Facility: CLINIC | Age: 68
End: 2018-07-10
Payer: MEDICARE

## 2018-07-10 VITALS
HEART RATE: 78 BPM | BODY MASS INDEX: 26.63 KG/M2 | SYSTOLIC BLOOD PRESSURE: 119 MMHG | RESPIRATION RATE: 16 BRPM | WEIGHT: 156 LBS | DIASTOLIC BLOOD PRESSURE: 74 MMHG | OXYGEN SATURATION: 93 % | HEIGHT: 64 IN

## 2018-07-10 PROCEDURE — 99213 OFFICE O/P EST LOW 20 MIN: CPT

## 2018-07-12 ENCOUNTER — RESULT REVIEW (OUTPATIENT)
Age: 68
End: 2018-07-12

## 2018-07-12 ENCOUNTER — APPOINTMENT (OUTPATIENT)
Dept: HEMATOLOGY ONCOLOGY | Facility: CLINIC | Age: 68
End: 2018-07-12
Payer: MEDICARE

## 2018-07-12 VITALS
OXYGEN SATURATION: 97 % | SYSTOLIC BLOOD PRESSURE: 129 MMHG | WEIGHT: 156.07 LBS | HEART RATE: 70 BPM | BODY MASS INDEX: 26.79 KG/M2 | TEMPERATURE: 98.7 F | DIASTOLIC BLOOD PRESSURE: 75 MMHG

## 2018-07-12 LAB
BASOPHILS # BLD AUTO: 0.1 K/UL — SIGNIFICANT CHANGE UP (ref 0–0.2)
BASOPHILS NFR BLD AUTO: 0.8 % — SIGNIFICANT CHANGE UP (ref 0–2)
EOSINOPHIL # BLD AUTO: 0.2 K/UL — SIGNIFICANT CHANGE UP (ref 0–0.5)
EOSINOPHIL NFR BLD AUTO: 3.4 % — SIGNIFICANT CHANGE UP (ref 0–6)
HCT VFR BLD CALC: 39.3 % — SIGNIFICANT CHANGE UP (ref 34.5–45)
HGB BLD-MCNC: 15 G/DL — SIGNIFICANT CHANGE UP (ref 11.5–15.5)
LYMPHOCYTES # BLD AUTO: 1.2 K/UL — SIGNIFICANT CHANGE UP (ref 1–3.3)
LYMPHOCYTES # BLD AUTO: 19.9 % — SIGNIFICANT CHANGE UP (ref 13–44)
MCHC RBC-ENTMCNC: 36.6 PG — HIGH (ref 27–34)
MCHC RBC-ENTMCNC: 38.3 GM/DL — HIGH (ref 32–36)
MCV RBC AUTO: 95.5 FL — SIGNIFICANT CHANGE UP (ref 80–100)
MONOCYTES # BLD AUTO: 0.4 K/UL — SIGNIFICANT CHANGE UP (ref 0–0.9)
MONOCYTES NFR BLD AUTO: 5.7 % — SIGNIFICANT CHANGE UP (ref 2–14)
NEUTROPHILS # BLD AUTO: 4.4 K/UL — SIGNIFICANT CHANGE UP (ref 1.8–7.4)
NEUTROPHILS NFR BLD AUTO: 70.1 % — SIGNIFICANT CHANGE UP (ref 43–77)
PLATELET # BLD AUTO: 176 K/UL — SIGNIFICANT CHANGE UP (ref 150–400)
RBC # BLD: 4.11 M/UL — SIGNIFICANT CHANGE UP (ref 3.8–5.2)
RBC # FLD: 12.2 % — SIGNIFICANT CHANGE UP (ref 10.3–14.5)
WBC # BLD: 6.3 K/UL — SIGNIFICANT CHANGE UP (ref 3.8–10.5)
WBC # FLD AUTO: 6.3 K/UL — SIGNIFICANT CHANGE UP (ref 3.8–10.5)

## 2018-07-12 PROCEDURE — 99214 OFFICE O/P EST MOD 30 MIN: CPT

## 2018-07-23 PROCEDURE — 77334 RADIATION TREATMENT AID(S): CPT | Mod: 26

## 2018-07-23 PROCEDURE — 77332 RADIATION TREATMENT AID(S): CPT | Mod: 26,59

## 2018-07-23 PROCEDURE — 57156 INS VAG BRACHYTX DEVICE: CPT

## 2018-07-23 PROCEDURE — 77770 HDR RDNCL NTRSTL/ICAV BRCHTX: CPT | Mod: 26

## 2018-07-23 PROCEDURE — 77316 BRACHYTX ISODOSE PLAN SIMPLE: CPT | Mod: 26

## 2018-07-23 PROCEDURE — 77290 THER RAD SIMULAJ FIELD CPLX: CPT | Mod: 26

## 2018-07-30 PROCEDURE — 57156 INS VAG BRACHYTX DEVICE: CPT

## 2018-07-30 PROCEDURE — 77770 HDR RDNCL NTRSTL/ICAV BRCHTX: CPT | Mod: 26

## 2018-07-30 PROCEDURE — 77332 RADIATION TREATMENT AID(S): CPT | Mod: 26

## 2018-07-31 ENCOUNTER — APPOINTMENT (OUTPATIENT)
Dept: GASTROENTEROLOGY | Facility: GI CENTER | Age: 68
End: 2018-07-31

## 2018-08-02 PROBLEM — E78.5 HYPERLIPIDEMIA, UNSPECIFIED: Chronic | Status: ACTIVE | Noted: 2017-12-08

## 2018-08-02 PROBLEM — C54.1 MALIGNANT NEOPLASM OF ENDOMETRIUM: Chronic | Status: ACTIVE | Noted: 2017-12-08

## 2018-08-06 PROCEDURE — 57156 INS VAG BRACHYTX DEVICE: CPT

## 2018-08-06 PROCEDURE — 77770 HDR RDNCL NTRSTL/ICAV BRCHTX: CPT | Mod: 26

## 2018-08-06 PROCEDURE — 77332 RADIATION TREATMENT AID(S): CPT | Mod: 26

## 2018-09-05 ENCOUNTER — APPOINTMENT (OUTPATIENT)
Dept: RADIATION ONCOLOGY | Facility: CLINIC | Age: 68
End: 2018-09-05
Payer: MEDICARE

## 2018-09-05 VITALS
HEART RATE: 91 BPM | OXYGEN SATURATION: 94 % | SYSTOLIC BLOOD PRESSURE: 117 MMHG | RESPIRATION RATE: 16 BRPM | HEIGHT: 64 IN | DIASTOLIC BLOOD PRESSURE: 72 MMHG | WEIGHT: 151 LBS | BODY MASS INDEX: 25.78 KG/M2

## 2018-09-05 PROCEDURE — 99024 POSTOP FOLLOW-UP VISIT: CPT

## 2018-10-09 ENCOUNTER — OUTPATIENT (OUTPATIENT)
Dept: OUTPATIENT SERVICES | Facility: HOSPITAL | Age: 68
LOS: 1 days | Discharge: HOME | End: 2018-10-09

## 2018-10-09 DIAGNOSIS — C55 MALIGNANT NEOPLASM OF UTERUS, PART UNSPECIFIED: ICD-10-CM

## 2018-10-16 ENCOUNTER — RESULT REVIEW (OUTPATIENT)
Age: 68
End: 2018-10-16

## 2018-10-16 ENCOUNTER — APPOINTMENT (OUTPATIENT)
Dept: HEMATOLOGY ONCOLOGY | Facility: CLINIC | Age: 68
End: 2018-10-16
Payer: MEDICARE

## 2018-10-16 ENCOUNTER — LABORATORY RESULT (OUTPATIENT)
Age: 68
End: 2018-10-16

## 2018-10-16 VITALS
HEART RATE: 71 BPM | HEIGHT: 64 IN | OXYGEN SATURATION: 95 % | TEMPERATURE: 98.6 F | WEIGHT: 155.97 LBS | DIASTOLIC BLOOD PRESSURE: 73 MMHG | BODY MASS INDEX: 26.63 KG/M2 | SYSTOLIC BLOOD PRESSURE: 117 MMHG

## 2018-10-16 LAB
ALBUMIN SERPL ELPH-MCNC: 4.9 G/DL
ALP BLD-CCNC: 63 U/L
ALT SERPL-CCNC: 12 U/L
ANION GAP SERPL CALC-SCNC: 26 MMOL/L
AST SERPL-CCNC: 26 U/L
BASOPHILS # BLD AUTO: 0.1 K/UL — SIGNIFICANT CHANGE UP (ref 0–0.2)
BASOPHILS NFR BLD AUTO: 1 % — SIGNIFICANT CHANGE UP (ref 0–2)
BILIRUB SERPL-MCNC: 0.6 MG/DL
BUN SERPL-MCNC: 22 MG/DL
CALCIUM SERPL-MCNC: 10.2 MG/DL
CHLORIDE SERPL-SCNC: 102 MMOL/L
CO2 SERPL-SCNC: 20 MMOL/L
CREAT SERPL-MCNC: 0.87 MG/DL
EOSINOPHIL # BLD AUTO: 0.1 K/UL — SIGNIFICANT CHANGE UP (ref 0–0.5)
EOSINOPHIL NFR BLD AUTO: 1.8 % — SIGNIFICANT CHANGE UP (ref 0–6)
GLUCOSE SERPL-MCNC: 158 MG/DL
HCT VFR BLD CALC: 40.4 % — SIGNIFICANT CHANGE UP (ref 34.5–45)
HGB BLD-MCNC: 14.5 G/DL — SIGNIFICANT CHANGE UP (ref 11.5–15.5)
LYMPHOCYTES # BLD AUTO: 1.2 K/UL — SIGNIFICANT CHANGE UP (ref 1–3.3)
LYMPHOCYTES # BLD AUTO: 14.4 % — SIGNIFICANT CHANGE UP (ref 13–44)
MCHC RBC-ENTMCNC: 33.9 PG — SIGNIFICANT CHANGE UP (ref 27–34)
MCHC RBC-ENTMCNC: 35.8 GM/DL — SIGNIFICANT CHANGE UP (ref 32–36)
MCV RBC AUTO: 94.6 FL — SIGNIFICANT CHANGE UP (ref 80–100)
MONOCYTES # BLD AUTO: 0.5 K/UL — SIGNIFICANT CHANGE UP (ref 0–0.9)
MONOCYTES NFR BLD AUTO: 6 % — SIGNIFICANT CHANGE UP (ref 2–14)
NEUTROPHILS # BLD AUTO: 6.1 K/UL — SIGNIFICANT CHANGE UP (ref 1.8–7.4)
NEUTROPHILS NFR BLD AUTO: 76.8 % — SIGNIFICANT CHANGE UP (ref 43–77)
PLATELET # BLD AUTO: 212 K/UL — SIGNIFICANT CHANGE UP (ref 150–400)
POTASSIUM SERPL-SCNC: 5.3 MMOL/L
PROT SERPL-MCNC: 7.6 G/DL
RBC # BLD: 4.28 M/UL — SIGNIFICANT CHANGE UP (ref 3.8–5.2)
RBC # FLD: 12.9 % — SIGNIFICANT CHANGE UP (ref 10.3–14.5)
SODIUM SERPL-SCNC: 147 MMOL/L
WBC # BLD: 8 K/UL — SIGNIFICANT CHANGE UP (ref 3.8–10.5)
WBC # FLD AUTO: 8 K/UL — SIGNIFICANT CHANGE UP (ref 3.8–10.5)

## 2018-10-16 PROCEDURE — 99214 OFFICE O/P EST MOD 30 MIN: CPT

## 2018-10-16 RX ORDER — ONDANSETRON 8 MG/1
8 TABLET ORAL
Qty: 90 | Refills: 1 | Status: DISCONTINUED | COMMUNITY
Start: 2017-07-07 | End: 2018-10-16

## 2018-10-16 RX ORDER — PROCHLORPERAZINE MALEATE 10 MG/1
10 TABLET ORAL EVERY 6 HOURS
Qty: 120 | Refills: 1 | Status: DISCONTINUED | COMMUNITY
Start: 2017-07-07 | End: 2018-10-16

## 2018-10-19 LAB
ALBUMIN SERPL ELPH-MCNC: 4.8 G/DL
ALP BLD-CCNC: 70 U/L
ALT SERPL-CCNC: 14 U/L
ANION GAP SERPL CALC-SCNC: 16 MMOL/L
AST SERPL-CCNC: 15 U/L
BILIRUB SERPL-MCNC: 0.6 MG/DL
BUN SERPL-MCNC: 22 MG/DL
CALCIUM SERPL-MCNC: 9.7 MG/DL
CHLORIDE SERPL-SCNC: 100 MMOL/L
CO2 SERPL-SCNC: 25 MMOL/L
CREAT SERPL-MCNC: 0.81 MG/DL
GLUCOSE SERPL-MCNC: 90 MG/DL
POTASSIUM SERPL-SCNC: 3.7 MMOL/L
PROT SERPL-MCNC: 7.2 G/DL
SODIUM SERPL-SCNC: 141 MMOL/L

## 2018-12-11 ENCOUNTER — APPOINTMENT (OUTPATIENT)
Dept: NEUROLOGY | Facility: CLINIC | Age: 68
End: 2018-12-11
Payer: MEDICARE

## 2018-12-11 VITALS
BODY MASS INDEX: 26.58 KG/M2 | WEIGHT: 150 LBS | DIASTOLIC BLOOD PRESSURE: 60 MMHG | SYSTOLIC BLOOD PRESSURE: 108 MMHG | HEIGHT: 63 IN

## 2018-12-11 PROCEDURE — 99204 OFFICE O/P NEW MOD 45 MIN: CPT

## 2018-12-13 ENCOUNTER — FORM ENCOUNTER (OUTPATIENT)
Age: 68
End: 2018-12-13

## 2018-12-14 ENCOUNTER — OUTPATIENT (OUTPATIENT)
Dept: OUTPATIENT SERVICES | Facility: HOSPITAL | Age: 68
LOS: 1 days | End: 2018-12-14
Payer: MEDICARE

## 2018-12-14 ENCOUNTER — APPOINTMENT (OUTPATIENT)
Dept: CT IMAGING | Facility: CLINIC | Age: 68
End: 2018-12-14
Payer: MEDICARE

## 2018-12-14 DIAGNOSIS — C55 MALIGNANT NEOPLASM OF UTERUS, PART UNSPECIFIED: ICD-10-CM

## 2018-12-14 PROCEDURE — 71260 CT THORAX DX C+: CPT | Mod: 26

## 2018-12-14 PROCEDURE — 71260 CT THORAX DX C+: CPT

## 2018-12-14 PROCEDURE — 74177 CT ABD & PELVIS W/CONTRAST: CPT | Mod: 26

## 2018-12-14 PROCEDURE — 74177 CT ABD & PELVIS W/CONTRAST: CPT

## 2019-01-07 ENCOUNTER — OUTPATIENT (OUTPATIENT)
Dept: OUTPATIENT SERVICES | Facility: HOSPITAL | Age: 69
LOS: 1 days | Discharge: HOME | End: 2019-01-07

## 2019-01-07 DIAGNOSIS — C54.1 MALIGNANT NEOPLASM OF ENDOMETRIUM: ICD-10-CM

## 2019-01-11 ENCOUNTER — APPOINTMENT (OUTPATIENT)
Dept: HEMATOLOGY ONCOLOGY | Facility: CLINIC | Age: 69
End: 2019-01-11
Payer: MEDICARE

## 2019-01-11 ENCOUNTER — RESULT REVIEW (OUTPATIENT)
Age: 69
End: 2019-01-11

## 2019-01-11 VITALS
TEMPERATURE: 98.7 F | OXYGEN SATURATION: 92 % | SYSTOLIC BLOOD PRESSURE: 111 MMHG | DIASTOLIC BLOOD PRESSURE: 73 MMHG | HEIGHT: 63 IN | HEART RATE: 80 BPM | WEIGHT: 155.02 LBS | BODY MASS INDEX: 27.47 KG/M2

## 2019-01-11 LAB
BASOPHILS # BLD AUTO: 0.1 K/UL — SIGNIFICANT CHANGE UP (ref 0–0.2)
BASOPHILS NFR BLD AUTO: 0.8 % — SIGNIFICANT CHANGE UP (ref 0–2)
EOSINOPHIL # BLD AUTO: 0.2 K/UL — SIGNIFICANT CHANGE UP (ref 0–0.5)
EOSINOPHIL NFR BLD AUTO: 2.4 % — SIGNIFICANT CHANGE UP (ref 0–6)
HCT VFR BLD CALC: 38.6 % — SIGNIFICANT CHANGE UP (ref 34.5–45)
HGB BLD-MCNC: 13.9 G/DL — SIGNIFICANT CHANGE UP (ref 11.5–15.5)
LYMPHOCYTES # BLD AUTO: 1.6 K/UL — SIGNIFICANT CHANGE UP (ref 1–3.3)
LYMPHOCYTES # BLD AUTO: 20.9 % — SIGNIFICANT CHANGE UP (ref 13–44)
MCHC RBC-ENTMCNC: 33 PG — SIGNIFICANT CHANGE UP (ref 27–34)
MCHC RBC-ENTMCNC: 36.1 GM/DL — HIGH (ref 32–36)
MCV RBC AUTO: 91.4 FL — SIGNIFICANT CHANGE UP (ref 80–100)
MONOCYTES # BLD AUTO: 0.6 K/UL — SIGNIFICANT CHANGE UP (ref 0–0.9)
MONOCYTES NFR BLD AUTO: 8 % — SIGNIFICANT CHANGE UP (ref 2–14)
NEUTROPHILS # BLD AUTO: 5.1 K/UL — SIGNIFICANT CHANGE UP (ref 1.8–7.4)
NEUTROPHILS NFR BLD AUTO: 68 % — SIGNIFICANT CHANGE UP (ref 43–77)
PLATELET # BLD AUTO: 217 K/UL — SIGNIFICANT CHANGE UP (ref 150–400)
RBC # BLD: 4.22 M/UL — SIGNIFICANT CHANGE UP (ref 3.8–5.2)
RBC # FLD: 13.9 % — SIGNIFICANT CHANGE UP (ref 10.3–14.5)
WBC # BLD: 7.6 K/UL — SIGNIFICANT CHANGE UP (ref 3.8–10.5)
WBC # FLD AUTO: 7.6 K/UL — SIGNIFICANT CHANGE UP (ref 3.8–10.5)

## 2019-01-11 PROCEDURE — 99214 OFFICE O/P EST MOD 30 MIN: CPT

## 2019-01-11 RX ORDER — GREEN TEA/HOODIA GORDONII 315-12.5MG
500 CAPSULE ORAL
Qty: 90 | Refills: 1 | Status: ACTIVE | COMMUNITY
Start: 2019-01-11 | End: 1900-01-01

## 2019-01-11 NOTE — PHYSICAL EXAM
[Fully active, able to carry on all pre-disease performance without restriction] : Status 0 - Fully active, able to carry on all pre-disease performance without restriction [Normal] : affect appropriate [de-identified] : NAD [de-identified] : supple [de-identified] : clear bilaterally [de-identified] : S1/S2 RR [de-identified] : No edema [de-identified] : soft, non tender, non distended

## 2019-01-11 NOTE — ASSESSMENT
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 She received 6 cycles of carbo-taxol followed by cytoreductive surgery, followed by 2 cycles of adjuvant carbo-taxol.   C9 omitted given prolonged cytopenias.  She completed vaginal cuff brachytherapy on 8/6/18. \par 6/28/18 surveillance PET - [facility]EUS.\par \par Doing well clinically.  12/14/18 - [facility]\par \par Plan:\par Surveillance Follow up every 3-6 months with CT scans every 6 months x 3 years and then every 6-12 months x 2 years. Next scans in 6/2019\par Low B12 - intrinsic factor Ab, repeat B12 level. Start B12 500 mcg sublingual daily\par F/u with Dr. Chandra. Needs screening colonoscopy\par RTO 3 months

## 2019-01-11 NOTE — ADDENDUM
[FreeTextEntry1] : I, Yuly Escalona, acted solely as a scribe for Dr. Wilma Ochoa on this date 1/11/19.

## 2019-01-11 NOTE — HISTORY OF PRESENT ILLNESS
[Disease: _____________________] : Disease: [unfilled] [M: ___] : M[unfilled] [AJCC Stage: ____] : AJCC Stage: [unfilled] [de-identified] : Ms. Haas is a 67 year old female with recently diagnosed endometrial cancer. She had an abnormal Pap smear on May 10, 2017, which showed HGSIL, positive HPV 16. On May 30, 2017, she underwent colposcopic examination. Cervical biopsies were performed with anterior cervical biopsy showing LGSIL and smaller fragments, suspicious for HGSIL; posterior cervical biopsy showing small focus of HGSIL. Endocervical curettage showed papillary serous carcinoma with psamomma body formation, and a small separate fragment of dysplastic squamous epithelium, faver high-grade squamous intraepithelial lesion (CORDELIA 2/moderate dysplasia).\par \par 144[address] MRI pelvis - There is abnormal increased signal in the lower uterine segment endometrial canal also involving the upper portion of the cervical canal. This is secondary \par to a hypoenhancing mass in this region measuring 1.8 x 0.9 x 1.9 cm.The left ovary/left adnexal region is enlarged and heterogeneous in appearance measuring 4.1 x 3.6 x 2.6 cm. It is in close proximity to the sigmoid colon which demonstrates extensive diverticulosis and a focal fluid collection inseparable from the wall of the sigmoid colon adjacent to the left ovary. This focal fluid collection measures 2.3 x 1.8 cm and demonstrates prominent enhancement of the tissue surrounding it which is inseparable from enhancement adjacent to the left ovary. Patient is reportedly asymptomatic but these findings may represent subacute sigmoid diverticulitis with intramural/serosal sigmoid fluid collection/abscess which secondarily involves the left ovary. Neoplastic disease in this region is felt to be less likely.There is a 1.4 cm enhancing polypoid lesion inseparable from the cecum likely arising from the serosal surface which is suspicious for a tumor implant. Lymph nodes within pelvis are normal in size.  There is an abnormal appearing left inguinal lymph node measuring 2.4 x 2.1 cm.   \par \par 6/23/17 PET CT -  FDG avid left inguinal lymph node, 2.5 x 2.1 cm (SUV 10.6; image 210). FDG avid left para-aortic lymph node, 1.4 x 1.3 cm (SUV 10.9; image 158).\par 1.3 x 1.2 cm soft tissue nodule along the serosal surface of the cecum (SUV 13.0; image 209), as well as an additional nodule 1.2 x 1.1 cm (image 208; SUV 11.6). FDG avidity in the sigmoid colon in an area of diverticulosis SUV 12.2 (image 203).Focus of FDG avidity in the endometrial cavity (SUV 6.7; \par image 210) .\par \par s/p cytoreduction on 12/18/17.  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 Haas returns for for follow up.  \par She saw neurology for short term memory issues. \par No bloating or pelvic pain. No abdominal pain. No nausea, vomiting, diarrhea or constipation. No imbalance issues. \par She reports fatigue but attributes it to waking up very early in the morning.

## 2019-01-11 NOTE — RESULTS/DATA
[FreeTextEntry1] : 12/14/18 CT C/A/P: Stable examination without evidence of recurrent or metastatic disease\par \par 3/29/18 PET CT\par IMPRESSION: FDG-PET/CT scan demonstrates: \par 1. Status post interval hysterectomy as compared to PET/CT from 11/14/2017. No evidence of recurrent or metastatic disease. \par 2. Unchanged subcentimeter right lower lobe lung nodule, which is too small to characterize with PET. \par 3. Segmental hypermetabolism in the area of sigmoid diverticulosis, unchanged in intensity but less extensive, and inflammatory. \par \par \par 11/14/17 PET CT -\par 1. Resolution of hypermetabolism associated with the left inguinal lymph node with decrease in size of the corresponding lymph node. Further decrease in size of small left para-aortic lymph node.\par 2. No discrete hypermetabolism in the endometrial cavity.\par 3. Resolution of hypermetabolism associated with small serosal implants along the cecum and the corresponding nodules are not delineated on the current exam.\par 4. Unchanged subcentimeter right lower lobe lung nodule, which is too small for PET characterization.\par 5. Segmental hypermetabolism in an area of sigmoid diverticulosis, not significantly changed, probably inflammatory.\par \par 8/21/17 PET CT - IMPRESSION:  \par 1. Decreased size and FDG avidity of left inguinal lymph node. Decreased size of left periaortic lymph node with resolution of FDG avidity.\par 2. No discrete FDG avidity in the endometrial cavity.\par 3. Decreased and/or resolved serosal implants involving the cecum.\par 4. Unchanged long segment sigmoid colonic uptake and an area of diverticulosis, possibly inflammatory.\par 5. Unchanged indeterminate 0.6 cm right lower lobe pulmonary nodule.\par \par 6/23/17 PET CT - IMPRESSION:  \par 1. FDG avidity in the endometrial cavity, probably representing primary malignancy.\par 2. FDG avid left inguinal and left para-aortic lymph nodes are probably metastatic.\par 3. FDG avid serosal nodules involving the cecum, probably represent tumor implants.\par 4. FDG avidity involving long segment of sigmoid colon in an area of diverticulitis may represent inflammatory changes, however neoplasm/serosal implants in this location are not excluded.\par 5.  Indeterminate 0.6 cm right lower lobe pulmonary nodule, below resolution of PET.

## 2019-01-14 LAB
ALBUMIN SERPL ELPH-MCNC: 4.9 G/DL
ALP BLD-CCNC: 75 U/L
ALT SERPL-CCNC: 13 U/L
ANION GAP SERPL CALC-SCNC: 12 MMOL/L
AST SERPL-CCNC: 16 U/L
BILIRUB SERPL-MCNC: 0.7 MG/DL
BUN SERPL-MCNC: 20 MG/DL
CALCIUM SERPL-MCNC: 10.2 MG/DL
CHLORIDE SERPL-SCNC: 103 MMOL/L
CO2 SERPL-SCNC: 28 MMOL/L
CREAT SERPL-MCNC: 0.97 MG/DL
FERRITIN SERPL-MCNC: 270 NG/ML
FOLATE SERPL-MCNC: 16.3 NG/ML
IF BLOCK AB SER QL: NORMAL
IRON SATN MFR SERPL: 19 %
IRON SERPL-MCNC: 61 UG/DL
POTASSIUM SERPL-SCNC: 4.4 MMOL/L
PROT SERPL-MCNC: 7.5 G/DL
SODIUM SERPL-SCNC: 143 MMOL/L
TIBC SERPL-MCNC: 315 UG/DL
UIBC SERPL-MCNC: 254 UG/DL
VIT B12 SERPL-MCNC: 220 PG/ML

## 2019-03-05 ENCOUNTER — APPOINTMENT (OUTPATIENT)
Dept: RADIATION ONCOLOGY | Facility: CLINIC | Age: 69
End: 2019-03-05
Payer: MEDICARE

## 2019-03-05 VITALS
SYSTOLIC BLOOD PRESSURE: 119 MMHG | WEIGHT: 149 LBS | RESPIRATION RATE: 16 BRPM | TEMPERATURE: 98 F | BODY MASS INDEX: 26.4 KG/M2 | HEART RATE: 71 BPM | DIASTOLIC BLOOD PRESSURE: 72 MMHG | HEIGHT: 63 IN | OXYGEN SATURATION: 96 %

## 2019-03-05 PROCEDURE — 99213 OFFICE O/P EST LOW 20 MIN: CPT

## 2019-03-05 NOTE — REASON FOR VISIT
[Post-Treatment Evaluation] : post-treatment evaluation for [Endometrial Cancer] : endometrial cancer [Other: _____] : [unfilled]

## 2019-03-05 NOTE — REVIEW OF SYSTEMS
[Negative] : Allergic/Immunologic [Edema Limbs: Grade 0] : Edema Limbs: Grade 0  [Fatigue: Grade 0] : Fatigue: Grade 0 [Localized Edema: Grade 0] : Localized Edema: Grade 0  [Neck Edema: Grade 0] : Neck Edema: Grade 0 [Urinary Incontinence: Grade 0] : Urinary Incontinence: Grade 0  [Genital Edema: Grade 0] : Genital Edema: Grade 0 [Vaginal Stricture: Grade 0] : Vaginal Stricture: Grade 0 [Vaginal Infection: Grade 0] : Vaginal Infection: Grade 0  [Cognitive Disturbance: Grade 1 - Mild cognitive disability; not interfering with work/school/life performance; specialized educational services/devices not indicated] : Cognitive Disturbance: Grade 1 - Mild cognitive disability; not interfering with work/school/life performance; specialized educational services/devices not indicated

## 2019-03-05 NOTE — PHYSICAL EXAM
[Normal] : oriented to person, place and time, the affect was normal, the mood was normal and not anxious [de-identified] : deferred [de-identified] : s/p SHAWNA/BSO ; very slight blood with pelvic exam.

## 2019-03-05 NOTE — HISTORY OF PRESENT ILLNESS
[FreeTextEntry1] : This 68 year old female returns today s/p radiation therapy (2,100 cG) to the vaginal cuff for  [id number], stage lVb adenocarcinoma of the endometrium completed 8/6/18. Denies vaginal itch, discharge, edema, change in bowel habits, change in urination, pain, fatigue, orweight loss.  Follows with Dr Ochoa every 3 months and is s/p  6 cycles of carbo/taxol followed by cytoreductive surgery, which was followed by 2 cycles of adjuvant carbo/taxol.  CT scan 12/7/18 showed no evidence of recurrence.  Reports she is not sexually active.

## 2019-03-05 NOTE — DISEASE MANAGEMENT
[Pathological] : TNM Stage: p [IVB] : IVB [FreeTextEntry4] : adenocarcinoma endometrium [TTNM] : 3a [NTNM] : 1 [MTNM] : 1 [de-identified] : 2100 cGy [de-identified] : vaginal cuff

## 2019-03-05 NOTE — LETTER GREETING
[Dear Doctor] : Dear Doctor, [Follow-Up] : Your patient, [unfilled] was seen in my office today for follow-up [Please see my note below.] : Please see my note below. [FreeTextEntry2] : Wilma Ochoa MD

## 2019-03-05 NOTE — LETTER CLOSING
[Sincerely yours,] : Sincerely yours, [FreeTextEntry3] : Raleigh Freire MD\par Physician in Chief\par Department of Radiation Medicine\par [facility] [place]\par [facility]\par \par  of Radiation Medicine\par [facility]\par at  [facility]\par \par Radiation \par [facility]/\par [facility] [place]\par [address]\par [place], [place] [address]\par \par Tel: [phone]\par Fax: [phone]\par

## 2019-03-28 ENCOUNTER — OUTPATIENT (OUTPATIENT)
Dept: OUTPATIENT SERVICES | Facility: HOSPITAL | Age: 69
LOS: 1 days | Discharge: HOME | End: 2019-03-28

## 2019-03-28 DIAGNOSIS — C55 MALIGNANT NEOPLASM OF UTERUS, PART UNSPECIFIED: ICD-10-CM

## 2019-03-28 DIAGNOSIS — C54.1 MALIGNANT NEOPLASM OF ENDOMETRIUM: ICD-10-CM

## 2019-04-08 ENCOUNTER — APPOINTMENT (OUTPATIENT)
Dept: HEMATOLOGY ONCOLOGY | Facility: CLINIC | Age: 69
End: 2019-04-08
Payer: MEDICARE

## 2019-04-08 ENCOUNTER — RESULT REVIEW (OUTPATIENT)
Age: 69
End: 2019-04-08

## 2019-04-08 VITALS
WEIGHT: 148.44 LBS | HEART RATE: 90 BPM | TEMPERATURE: 98.9 F | BODY MASS INDEX: 26.3 KG/M2 | SYSTOLIC BLOOD PRESSURE: 126 MMHG | DIASTOLIC BLOOD PRESSURE: 73 MMHG | OXYGEN SATURATION: 96 % | HEIGHT: 63 IN

## 2019-04-08 LAB
BASOPHILS # BLD AUTO: 0.1 K/UL — SIGNIFICANT CHANGE UP (ref 0–0.2)
BASOPHILS NFR BLD AUTO: 1.4 % — SIGNIFICANT CHANGE UP (ref 0–2)
EOSINOPHIL # BLD AUTO: 0.2 K/UL — SIGNIFICANT CHANGE UP (ref 0–0.5)
EOSINOPHIL NFR BLD AUTO: 3.5 % — SIGNIFICANT CHANGE UP (ref 0–6)
HCT VFR BLD CALC: 38.8 % — SIGNIFICANT CHANGE UP (ref 34.5–45)
HGB BLD-MCNC: 14.2 G/DL — SIGNIFICANT CHANGE UP (ref 11.5–15.5)
LYMPHOCYTES # BLD AUTO: 1.3 K/UL — SIGNIFICANT CHANGE UP (ref 1–3.3)
LYMPHOCYTES # BLD AUTO: 18.7 % — SIGNIFICANT CHANGE UP (ref 13–44)
MCHC RBC-ENTMCNC: 32 PG — SIGNIFICANT CHANGE UP (ref 27–34)
MCHC RBC-ENTMCNC: 36.5 G/DL — HIGH (ref 32–36)
MCV RBC AUTO: 87.8 FL — SIGNIFICANT CHANGE UP (ref 80–100)
MONOCYTES # BLD AUTO: 0.5 K/UL — SIGNIFICANT CHANGE UP (ref 0–0.9)
MONOCYTES NFR BLD AUTO: 6.6 % — SIGNIFICANT CHANGE UP (ref 2–14)
NEUTROPHILS # BLD AUTO: 4.9 K/UL — SIGNIFICANT CHANGE UP (ref 1.8–7.4)
NEUTROPHILS NFR BLD AUTO: 69.7 % — SIGNIFICANT CHANGE UP (ref 43–77)
PLATELET # BLD AUTO: 251 K/UL — SIGNIFICANT CHANGE UP (ref 150–400)
RBC # BLD: 4.42 M/UL — SIGNIFICANT CHANGE UP (ref 3.8–5.2)
RBC # FLD: 12.4 % — SIGNIFICANT CHANGE UP (ref 10.3–14.5)
WBC # BLD: 7.1 K/UL — SIGNIFICANT CHANGE UP (ref 3.8–10.5)
WBC # FLD AUTO: 7.1 K/UL — SIGNIFICANT CHANGE UP (ref 3.8–10.5)

## 2019-04-08 PROCEDURE — 99214 OFFICE O/P EST MOD 30 MIN: CPT

## 2019-04-08 NOTE — HISTORY OF PRESENT ILLNESS
[Disease: _____________________] : Disease: [unfilled] [M: ___] : M[unfilled] [AJCC Stage: ____] : AJCC Stage: [unfilled] [de-identified] : Ms. Haas is a 67 year old female with recently diagnosed endometrial cancer. She had an abnormal Pap smear on May 10, 2017, which showed HGSIL, positive HPV 16. On May 30, 2017, she underwent colposcopic examination. Cervical biopsies were performed with anterior cervical biopsy showing LGSIL and smaller fragments, suspicious for HGSIL; posterior cervical biopsy showing small focus of HGSIL. Endocervical curettage showed papillary serous carcinoma with psamomma body formation, and a small separate fragment of dysplastic squamous epithelium, faver high-grade squamous intraepithelial lesion (CORDELIA 2/moderate dysplasia).\par \par [address]407 MRI pelvis - There is abnormal increased signal in the lower uterine segment endometrial canal also involving the upper portion of the cervical canal. This is secondary \par to a hypoenhancing mass in this region measuring 1.8 x 0.9 x 1.9 cm.The left ovary/left adnexal region is enlarged and heterogeneous in appearance measuring 4.1 x 3.6 x 2.6 cm. It is in close proximity to the sigmoid colon which demonstrates extensive diverticulosis and a focal fluid collection inseparable from the wall of the sigmoid colon adjacent to the left ovary. This focal fluid collection measures 2.3 x 1.8 cm and demonstrates prominent enhancement of the tissue surrounding it which is inseparable from enhancement adjacent to the left ovary. Patient is reportedly asymptomatic but these findings may represent subacute sigmoid diverticulitis with intramural/serosal sigmoid fluid collection/abscess which secondarily involves the left ovary. Neoplastic disease in this region is felt to be less likely.There is a 1.4 cm enhancing polypoid lesion inseparable from the cecum likely arising from the serosal surface which is suspicious for a tumor implant. Lymph nodes within pelvis are normal in size.  There is an abnormal appearing left inguinal lymph node measuring 2.4 x 2.1 cm.   \par \par 6/23/17 PET CT -  FDG avid left inguinal lymph node, 2.5 x 2.1 cm (SUV 10.6; image 210). FDG avid left para-aortic lymph node, 1.4 x 1.3 cm (SUV 10.9; image 158).\par 1.3 x 1.2 cm soft tissue nodule along the serosal surface of the cecum (SUV 13.0; image 209), as well as an additional nodule 1.2 x 1.1 cm (image 208; SUV 11.6). FDG avidity in the sigmoid colon in an area of diverticulosis SUV 12.2 (image 203).Focus of FDG avidity in the endometrial cavity (SUV 6.7; \par image 210) .\par \par s/p cytoreduction on 12/18/17.  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 Haas returns for for follow up.  \par Yvan took B12 for 2 months and then stopped as she moved and misplaced it. \par No abdominal pain or pelvic pain. No abdominal distention. No abnormal masses.\par No nausea or vomiting. No balance issues. \par She denies fatigue. \par Continues to smoke up to 1 PPD.\par She is now living with her daughter.

## 2019-04-08 NOTE — ASSESSMENT
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 She received 6 cycles of carbo-taxol followed by cytoreductive surgery, followed by 2 cycles of adjuvant carbo-taxol.   C9 omitted given prolonged cytopenias.  She completed vaginal cuff brachytherapy on 8/6/18. \par 12/14/18 CT scans- ZEUS\par \par Doing well clinically. \par \par Plan:\par Surveillance Follow up every 3-6 months with CT scans every 6 months x 3 years and then every 6-12 months x 2 years. Next scans in 6/2019\par Resume B12 500 mcg sublingual daily\par F/u with Dr. Chandra. Needs screening colonoscopy\par RTO 3 months

## 2019-04-08 NOTE — PHYSICAL EXAM
[Fully active, able to carry on all pre-disease performance without restriction] : Status 0 - Fully active, able to carry on all pre-disease performance without restriction [Normal] : affect appropriate [de-identified] : NAD [de-identified] : supple [de-identified] : clear bilaterally [de-identified] : S1/S2 RR [de-identified] : No edema [de-identified] : soft, non tender, non distended

## 2019-06-11 ENCOUNTER — APPOINTMENT (OUTPATIENT)
Dept: NEUROLOGY | Facility: CLINIC | Age: 69
End: 2019-06-11
Payer: MEDICARE

## 2019-06-11 VITALS
WEIGHT: 145 LBS | SYSTOLIC BLOOD PRESSURE: 120 MMHG | HEIGHT: 64 IN | DIASTOLIC BLOOD PRESSURE: 70 MMHG | BODY MASS INDEX: 24.75 KG/M2

## 2019-06-11 PROCEDURE — 99213 OFFICE O/P EST LOW 20 MIN: CPT

## 2019-06-11 RX ORDER — POLYETHYLENE GLYCOL 3350, SODIUM SULFATE, SODIUM CHLORIDE, POTASSIUM CHLORIDE, ASCORBIC ACID, SODIUM ASCORBATE 7.5-2.691G
100 KIT ORAL
Qty: 1 | Refills: 0 | Status: DISCONTINUED | COMMUNITY
Start: 2018-04-27 | End: 2019-06-11

## 2019-06-11 NOTE — HISTORY OF PRESENT ILLNESS
[FreeTextEntry1] : Followup visit June 11, 2019:\par This is a 69-year-old woman who returns for followup. She was initially seen in December with problem membrane conversations. She is not having any new issues. She finds that at about the same as it was previously. Is not getting lost. She's having any trouble with ADLs. She is doing fine in her home. She may forget something that was told to her a few days later. She is here today for neurologic followup.

## 2019-06-11 NOTE — PHYSICAL EXAM
[Person] : oriented to person [Time] : oriented to time [Short Term Intact] : short term memory intact [Place] : oriented to place [Registration Intact] : recent registration memory intact [Remote Intact] : remote memory intact [Span Intact] : the attention span was normal [Concentration Intact] : normal concentrating ability [Naming Objects] : no difficulty naming common objects [Visual Intact] : visual attention was ~T not ~L decreased [Repeating Phrases] : no difficulty repeating a phrase [Fluency] : fluency intact [Current Events] : adequate knowledge of current events [Past History] : adequate knowledge of personal past history [Comprehension] : comprehension intact [Cranial Nerves Optic (II)] : visual acuity intact bilaterally,  visual fields full to confrontation, pupils equal round and reactive to light [Cranial Nerves Oculomotor (III)] : extraocular motion intact [Cranial Nerves Trigeminal (V)] : facial sensation intact symmetrically [Cranial Nerves Vestibulocochlear (VIII)] : hearing was intact bilaterally [Cranial Nerves Facial (VII)] : face symmetrical [Cranial Nerves Glossopharyngeal (IX)] : tongue and palate midline [Cranial Nerves Accessory (XI - Cranial And Spinal)] : head turning and shoulder shrug symmetric [Motor Tone] : muscle tone was normal in all four extremities [Cranial Nerves Hypoglossal (XII)] : there was no tongue deviation with protrusion [Motor Strength] : muscle strength was normal in all four extremities [Involuntary Movements] : no involuntary movements were seen [Paresis Pronator Drift Right-Sided] : no pronator drift on the right [No Muscle Atrophy] : normal bulk in all four extremities [Paresis Pronator Drift Left-Sided] : no pronator drift on the left [Sensation Tactile Decrease] : light touch was intact [Sensation Pain / Temperature Decrease] : pain and temperature was intact [Sensation Vibration Decrease] : vibration was intact [Proprioception] : proprioception was intact [Abnormal Walk] : normal gait [Balance] : balance was intact [Tremor] : no tremor present [Past-pointing] : there was no past-pointing [Coordination - Dysmetria Impaired Finger-to-Nose Bilateral] : not present [2+] : Patella left 2+ [FreeTextEntry4] : 2/3 recall [Extraocular Movements] : extraocular movements were intact [Sclera] : the sclera and conjunctiva were normal [PERRL With Normal Accommodation] : pupils were equal in size, round, reactive to light, with normal accommodation [No APD] : no afferent pupillary defect [No VINICIO] : no internuclear ophthalmoplegia [Full Visual Field] : full visual field

## 2019-06-11 NOTE — ASSESSMENT
[FreeTextEntry1] : This is a 69-year-old woman who has some problems with remembering conversations. She is not have any other signs of memory loss. I doubt that she has dementia at this point. I asked her to call me should the memory worsened precipitously. Otherwise I will see her back in the office as needed.

## 2019-06-11 NOTE — CONSULT LETTER
[Dear  ___] : Dear  [unfilled], [Courtesy Letter:] : I had the pleasure of seeing your patient, [unfilled], in my office today. [Please see my note below.] : Please see my note below. [Consult Closing:] : Thank you very much for allowing me to participate in the care of this patient.  If you have any questions, please do not hesitate to contact me. [Sincerely,] : Sincerely, [FreeTextEntry3] : Dima Cavanaugh M.D., Ph.D. DPN-N\par [facility]\par Neurology at [place]\par Medical Director of Stroke Services\par [facility]\par

## 2019-07-02 ENCOUNTER — APPOINTMENT (OUTPATIENT)
Dept: CT IMAGING | Facility: CLINIC | Age: 69
End: 2019-07-02
Payer: MEDICARE

## 2019-07-04 ENCOUNTER — FORM ENCOUNTER (OUTPATIENT)
Age: 69
End: 2019-07-04

## 2019-07-05 ENCOUNTER — APPOINTMENT (OUTPATIENT)
Dept: CT IMAGING | Facility: CLINIC | Age: 69
End: 2019-07-05
Payer: MEDICARE

## 2019-07-05 ENCOUNTER — OUTPATIENT (OUTPATIENT)
Dept: OUTPATIENT SERVICES | Facility: HOSPITAL | Age: 69
LOS: 1 days | End: 2019-07-05
Payer: MEDICARE

## 2019-07-05 DIAGNOSIS — C55 MALIGNANT NEOPLASM OF UTERUS, PART UNSPECIFIED: ICD-10-CM

## 2019-07-05 PROCEDURE — 74177 CT ABD & PELVIS W/CONTRAST: CPT | Mod: 26

## 2019-07-05 PROCEDURE — 82565 ASSAY OF CREATININE: CPT

## 2019-07-05 PROCEDURE — 74177 CT ABD & PELVIS W/CONTRAST: CPT

## 2019-07-05 PROCEDURE — 71260 CT THORAX DX C+: CPT | Mod: 26

## 2019-07-05 PROCEDURE — 71260 CT THORAX DX C+: CPT

## 2019-07-15 ENCOUNTER — FORM ENCOUNTER (OUTPATIENT)
Age: 69
End: 2019-07-15

## 2019-07-16 ENCOUNTER — APPOINTMENT (OUTPATIENT)
Dept: NUCLEAR MEDICINE | Facility: CLINIC | Age: 69
End: 2019-07-16
Payer: MEDICARE

## 2019-07-16 ENCOUNTER — APPOINTMENT (OUTPATIENT)
Dept: MRI IMAGING | Facility: CLINIC | Age: 69
End: 2019-07-16
Payer: MEDICARE

## 2019-07-16 ENCOUNTER — OUTPATIENT (OUTPATIENT)
Dept: OUTPATIENT SERVICES | Facility: HOSPITAL | Age: 69
LOS: 1 days | End: 2019-07-16

## 2019-07-16 DIAGNOSIS — K86.9 DISEASE OF PANCREAS, UNSPECIFIED: ICD-10-CM

## 2019-07-16 PROCEDURE — 78815 PET IMAGE W/CT SKULL-THIGH: CPT | Mod: 26,PI

## 2019-07-22 ENCOUNTER — APPOINTMENT (OUTPATIENT)
Dept: MRI IMAGING | Facility: CLINIC | Age: 69
End: 2019-07-22

## 2019-07-22 ENCOUNTER — OUTPATIENT (OUTPATIENT)
Dept: OUTPATIENT SERVICES | Facility: HOSPITAL | Age: 69
LOS: 1 days | End: 2019-07-22

## 2019-07-22 DIAGNOSIS — K86.9 DISEASE OF PANCREAS, UNSPECIFIED: ICD-10-CM

## 2019-07-23 ENCOUNTER — OUTPATIENT (OUTPATIENT)
Dept: OUTPATIENT SERVICES | Facility: HOSPITAL | Age: 69
LOS: 1 days | Discharge: HOME | End: 2019-07-23

## 2019-07-23 ENCOUNTER — RX RENEWAL (OUTPATIENT)
Age: 69
End: 2019-07-23

## 2019-07-23 DIAGNOSIS — K86.9 DISEASE OF PANCREAS, UNSPECIFIED: ICD-10-CM

## 2019-07-23 DIAGNOSIS — C55 MALIGNANT NEOPLASM OF UTERUS, PART UNSPECIFIED: ICD-10-CM

## 2019-07-23 DIAGNOSIS — C54.1 MALIGNANT NEOPLASM OF ENDOMETRIUM: ICD-10-CM

## 2019-07-26 ENCOUNTER — APPOINTMENT (OUTPATIENT)
Dept: HEMATOLOGY ONCOLOGY | Facility: CLINIC | Age: 69
End: 2019-07-26
Payer: MEDICARE

## 2019-07-26 ENCOUNTER — RESULT REVIEW (OUTPATIENT)
Age: 69
End: 2019-07-26

## 2019-07-26 VITALS
HEIGHT: 64 IN | DIASTOLIC BLOOD PRESSURE: 76 MMHG | BODY MASS INDEX: 24.42 KG/M2 | OXYGEN SATURATION: 91 % | HEART RATE: 85 BPM | WEIGHT: 143.02 LBS | SYSTOLIC BLOOD PRESSURE: 120 MMHG

## 2019-07-26 LAB
BASOPHILS # BLD AUTO: 0.1 K/UL — SIGNIFICANT CHANGE UP (ref 0–0.2)
BASOPHILS NFR BLD AUTO: 1 % — SIGNIFICANT CHANGE UP (ref 0–2)
EOSINOPHIL # BLD AUTO: 0.3 K/UL — SIGNIFICANT CHANGE UP (ref 0–0.5)
EOSINOPHIL NFR BLD AUTO: 3.5 % — SIGNIFICANT CHANGE UP (ref 0–6)
HCT VFR BLD CALC: 44.4 % — SIGNIFICANT CHANGE UP (ref 34.5–45)
HGB BLD-MCNC: 15.8 G/DL — HIGH (ref 11.5–15.5)
LYMPHOCYTES # BLD AUTO: 1.6 K/UL — SIGNIFICANT CHANGE UP (ref 1–3.3)
LYMPHOCYTES # BLD AUTO: 20.2 % — SIGNIFICANT CHANGE UP (ref 13–44)
MCHC RBC-ENTMCNC: 31.3 PG — SIGNIFICANT CHANGE UP (ref 27–34)
MCHC RBC-ENTMCNC: 35.6 G/DL — SIGNIFICANT CHANGE UP (ref 32–36)
MCV RBC AUTO: 88.1 FL — SIGNIFICANT CHANGE UP (ref 80–100)
MONOCYTES # BLD AUTO: 0.3 K/UL — SIGNIFICANT CHANGE UP (ref 0–0.9)
MONOCYTES NFR BLD AUTO: 4.5 % — SIGNIFICANT CHANGE UP (ref 2–14)
NEUTROPHILS # BLD AUTO: 5.5 K/UL — SIGNIFICANT CHANGE UP (ref 1.8–7.4)
NEUTROPHILS NFR BLD AUTO: 70.8 % — SIGNIFICANT CHANGE UP (ref 43–77)
PLATELET # BLD AUTO: 261 K/UL — SIGNIFICANT CHANGE UP (ref 150–400)
RBC # BLD: 5.04 M/UL — SIGNIFICANT CHANGE UP (ref 3.8–5.2)
RBC # FLD: 13 % — SIGNIFICANT CHANGE UP (ref 10.3–14.5)
WBC # BLD: 7.8 K/UL — SIGNIFICANT CHANGE UP (ref 3.8–10.5)
WBC # FLD AUTO: 7.8 K/UL — SIGNIFICANT CHANGE UP (ref 3.8–10.5)

## 2019-07-26 PROCEDURE — 99214 OFFICE O/P EST MOD 30 MIN: CPT

## 2019-07-26 RX ORDER — SERTRALINE 25 MG/1
25 TABLET, FILM COATED ORAL
Qty: 90 | Refills: 0 | Status: DISCONTINUED | COMMUNITY
Start: 2018-09-20 | End: 2019-07-26

## 2019-07-29 LAB
ALBUMIN SERPL ELPH-MCNC: 4.9 G/DL
ALP BLD-CCNC: 70 U/L
ALT SERPL-CCNC: 9 U/L
ANION GAP SERPL CALC-SCNC: 14 MMOL/L
AST SERPL-CCNC: 14 U/L
BILIRUB SERPL-MCNC: 0.5 MG/DL
BUN SERPL-MCNC: 19 MG/DL
CALCIUM SERPL-MCNC: 10 MG/DL
CANCER AG125 SERPL-ACNC: 39 U/ML
CHLORIDE SERPL-SCNC: 101 MMOL/L
CO2 SERPL-SCNC: 27 MMOL/L
CREAT SERPL-MCNC: 0.88 MG/DL
GLUCOSE SERPL-MCNC: 147 MG/DL
POTASSIUM SERPL-SCNC: 3.7 MMOL/L
PROT SERPL-MCNC: 7.8 G/DL
SODIUM SERPL-SCNC: 142 MMOL/L

## 2019-08-02 NOTE — ADDENDUM
[FreeTextEntry1] : I, Cherelle Isbell, acted solely as a scribe for Dr. Wilma Ochoa on this date 7/26/19.

## 2019-08-02 NOTE — HISTORY OF PRESENT ILLNESS
[Disease: _____________________] : Disease: [unfilled] [M: ___] : M[unfilled] [AJCC Stage: ____] : AJCC Stage: [unfilled] [de-identified] : Ms. Haas is a 67 year old female with recently diagnosed endometrial cancer. She had an abnormal Pap smear on May 10, 2017, which showed HGSIL, positive HPV 16. On May 30, 2017, she underwent colposcopic examination. Cervical biopsies were performed with anterior cervical biopsy showing LGSIL and smaller fragments, suspicious for HGSIL; posterior cervical biopsy showing small focus of HGSIL. 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 This is secondary \par to a hypoenhancing mass in this region measuring 1.8 x 0.9 x 1.9 cm.The left ovary/left adnexal region is enlarged and heterogeneous in appearance measuring 4.1 x 3.6 x 2.6 cm. It is in close proximity to the sigmoid colon which demonstrates extensive diverticulosis and a focal fluid collection inseparable from the wall of the sigmoid colon adjacent to the left ovary. This focal fluid collection measures 2.3 x 1.8 cm and demonstrates prominent enhancement of the tissue surrounding it which is inseparable from enhancement adjacent to the left ovary. Patient is reportedly asymptomatic but these findings may represent subacute sigmoid diverticulitis with intramural/serosal sigmoid fluid collection/abscess which secondarily involves the left ovary. Neoplastic disease in this region is felt to be less likely.There is a 1.4 cm enhancing polypoid lesion inseparable from the cecum likely arising from the serosal surface which is suspicious for a tumor implant. Lymph nodes within pelvis are normal in size.  There is an abnormal appearing left inguinal lymph node measuring 2.4 x 2.1 cm.   \par \par 6/23/17 PET CT -  FDG avid left inguinal lymph node, 2.5 x 2.1 cm (SUV 10.6; image 210). FDG avid left para-aortic lymph node, 1.4 x 1.3 cm (SUV 10.9; image 158).\par 1.3 x 1.2 cm soft tissue nodule along the serosal surface of the cecum (SUV 13.0; image 209), as well as an additional nodule 1.2 x 1.1 cm (image 208; SUV 11.6). FDG avidity in the sigmoid colon in an area of diverticulosis SUV 12.2 (image 203).Focus of FDG avidity in the endometrial cavity (SUV 6.7; \par image 210) .\par \par s/p cytoreduction on 12/18/17.  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 Haas returns for for follow up.  \par Not currently taking supplemental B12. \par Weight is stable. \par No abdominal pain or pelvic pain. No abdominal distention. No abnormal masses.\par No nausea or vomiting. No balance issues. \par She denies fatigue. \par Continues to smoke up to 1 PPD.\par Denies any neuropathy. \par \par 7/16/19 PET/CT: \par 1. FDG avid left para-aortic and bilateral internal iliac lymph nodes, suspicious for recurrent/metastatic disease\par 2. No discrete FDG avidity corresponding to abnormal pancreatic enhancement on CT. Suggest correlation with same day MRI which is dictated separately \par 3. Unchanged 0.5 cm right lower lobe pulmonary nodule, too small to characterize\par 4. Subcentimeter minimally FDG avid right submental lymph, nonspecific, possibly reactive.

## 2019-08-02 NOTE — PHYSICAL EXAM
[Fully active, able to carry on all pre-disease performance without restriction] : Status 0 - Fully active, able to carry on all pre-disease performance without restriction [Normal] : affect appropriate [de-identified] : NAD [de-identified] : supple [de-identified] : clear bilaterally [de-identified] : S1/S2 RR [de-identified] : No edema [de-identified] : soft, non tender, non distended

## 2019-08-08 ENCOUNTER — FORM ENCOUNTER (OUTPATIENT)
Age: 69
End: 2019-08-08

## 2019-08-09 ENCOUNTER — OUTPATIENT (OUTPATIENT)
Dept: OUTPATIENT SERVICES | Facility: HOSPITAL | Age: 69
LOS: 1 days | End: 2019-08-09

## 2019-08-09 ENCOUNTER — APPOINTMENT (OUTPATIENT)
Dept: MRI IMAGING | Facility: CLINIC | Age: 69
End: 2019-08-09
Payer: MEDICARE

## 2019-08-09 DIAGNOSIS — C55 MALIGNANT NEOPLASM OF UTERUS, PART UNSPECIFIED: ICD-10-CM

## 2019-08-09 PROCEDURE — 74183 MRI ABD W/O CNTR FLWD CNTR: CPT | Mod: 26

## 2019-08-12 ENCOUNTER — RESULT REVIEW (OUTPATIENT)
Age: 69
End: 2019-08-12

## 2019-08-12 ENCOUNTER — RX RENEWAL (OUTPATIENT)
Age: 69
End: 2019-08-12

## 2019-08-12 ENCOUNTER — LABORATORY RESULT (OUTPATIENT)
Age: 69
End: 2019-08-12

## 2019-08-12 ENCOUNTER — APPOINTMENT (OUTPATIENT)
Age: 69
End: 2019-08-12

## 2019-08-12 DIAGNOSIS — R11.2 NAUSEA WITH VOMITING, UNSPECIFIED: ICD-10-CM

## 2019-08-12 DIAGNOSIS — T45.1X5A NAUSEA WITH VOMITING, UNSPECIFIED: ICD-10-CM

## 2019-08-12 LAB
BASOPHILS # BLD AUTO: 0.1 K/UL — SIGNIFICANT CHANGE UP (ref 0–0.2)
BASOPHILS NFR BLD AUTO: 1.3 % — SIGNIFICANT CHANGE UP (ref 0–2)
EOSINOPHIL # BLD AUTO: 0.4 K/UL — SIGNIFICANT CHANGE UP (ref 0–0.5)
EOSINOPHIL NFR BLD AUTO: 4.5 % — SIGNIFICANT CHANGE UP (ref 0–6)
HCT VFR BLD CALC: 43.6 % — SIGNIFICANT CHANGE UP (ref 34.5–45)
HGB BLD-MCNC: 16.3 G/DL — HIGH (ref 11.5–15.5)
LYMPHOCYTES # BLD AUTO: 1.2 K/UL — SIGNIFICANT CHANGE UP (ref 1–3.3)
LYMPHOCYTES # BLD AUTO: 15.3 % — SIGNIFICANT CHANGE UP (ref 13–44)
MCHC RBC-ENTMCNC: 32.8 PG — SIGNIFICANT CHANGE UP (ref 27–34)
MCHC RBC-ENTMCNC: 37.5 G/DL — HIGH (ref 32–36)
MCV RBC AUTO: 87.6 FL — SIGNIFICANT CHANGE UP (ref 80–100)
MONOCYTES # BLD AUTO: 0.5 K/UL — SIGNIFICANT CHANGE UP (ref 0–0.9)
MONOCYTES NFR BLD AUTO: 6.6 % — SIGNIFICANT CHANGE UP (ref 2–14)
NEUTROPHILS # BLD AUTO: 5.6 K/UL — SIGNIFICANT CHANGE UP (ref 1.8–7.4)
NEUTROPHILS NFR BLD AUTO: 72.3 % — SIGNIFICANT CHANGE UP (ref 43–77)
PLATELET # BLD AUTO: 238 K/UL — SIGNIFICANT CHANGE UP (ref 150–400)
RBC # BLD: 4.98 M/UL — SIGNIFICANT CHANGE UP (ref 3.8–5.2)
RBC # FLD: 12.8 % — SIGNIFICANT CHANGE UP (ref 10.3–14.5)
WBC # BLD: 7.8 K/UL — SIGNIFICANT CHANGE UP (ref 3.8–10.5)
WBC # FLD AUTO: 7.8 K/UL — SIGNIFICANT CHANGE UP (ref 3.8–10.5)

## 2019-08-12 PROCEDURE — 93010 ELECTROCARDIOGRAM REPORT: CPT

## 2019-08-13 DIAGNOSIS — R11.2 NAUSEA WITH VOMITING, UNSPECIFIED: ICD-10-CM

## 2019-08-13 DIAGNOSIS — Z51.11 ENCOUNTER FOR ANTINEOPLASTIC CHEMOTHERAPY: ICD-10-CM

## 2019-08-13 DIAGNOSIS — Z51.89 ENCOUNTER FOR OTHER SPECIFIED AFTERCARE: ICD-10-CM

## 2019-08-22 ENCOUNTER — OUTPATIENT (OUTPATIENT)
Dept: OUTPATIENT SERVICES | Facility: HOSPITAL | Age: 69
LOS: 1 days | Discharge: HOME | End: 2019-08-22

## 2019-08-22 DIAGNOSIS — C55 MALIGNANT NEOPLASM OF UTERUS, PART UNSPECIFIED: ICD-10-CM

## 2019-08-22 DIAGNOSIS — C54.1 MALIGNANT NEOPLASM OF ENDOMETRIUM: ICD-10-CM

## 2019-08-23 ENCOUNTER — APPOINTMENT (OUTPATIENT)
Dept: HEMATOLOGY ONCOLOGY | Facility: CLINIC | Age: 69
End: 2019-08-23

## 2019-08-26 ENCOUNTER — APPOINTMENT (OUTPATIENT)
Dept: HEMATOLOGY ONCOLOGY | Facility: CLINIC | Age: 69
End: 2019-08-26
Payer: MEDICARE

## 2019-08-26 ENCOUNTER — RESULT REVIEW (OUTPATIENT)
Age: 69
End: 2019-08-26

## 2019-08-26 VITALS
TEMPERATURE: 98.8 F | HEIGHT: 64 IN | DIASTOLIC BLOOD PRESSURE: 75 MMHG | OXYGEN SATURATION: 91 % | SYSTOLIC BLOOD PRESSURE: 115 MMHG | HEART RATE: 71 BPM

## 2019-08-26 DIAGNOSIS — E83.42 HYPOMAGNESEMIA: ICD-10-CM

## 2019-08-26 LAB
BASOPHILS # BLD AUTO: 0.1 K/UL — SIGNIFICANT CHANGE UP (ref 0–0.2)
BASOPHILS NFR BLD AUTO: 1 % — SIGNIFICANT CHANGE UP (ref 0–2)
EOSINOPHIL # BLD AUTO: 0.3 K/UL — SIGNIFICANT CHANGE UP (ref 0–0.5)
EOSINOPHIL NFR BLD AUTO: 2.2 % — SIGNIFICANT CHANGE UP (ref 0–6)
HCT VFR BLD CALC: 38.4 % — SIGNIFICANT CHANGE UP (ref 34.5–45)
HGB BLD-MCNC: 14.3 G/DL — SIGNIFICANT CHANGE UP (ref 11.5–15.5)
LYMPHOCYTES # BLD AUTO: 1.6 K/UL — SIGNIFICANT CHANGE UP (ref 1–3.3)
LYMPHOCYTES # BLD AUTO: 11.8 % — LOW (ref 13–44)
MCHC RBC-ENTMCNC: 32.8 PG — SIGNIFICANT CHANGE UP (ref 27–34)
MCHC RBC-ENTMCNC: 37.1 G/DL — HIGH (ref 32–36)
MCV RBC AUTO: 88.2 FL — SIGNIFICANT CHANGE UP (ref 80–100)
MONOCYTES # BLD AUTO: 0.6 K/UL — SIGNIFICANT CHANGE UP (ref 0–0.9)
MONOCYTES NFR BLD AUTO: 4.7 % — SIGNIFICANT CHANGE UP (ref 2–14)
NEUTROPHILS # BLD AUTO: 11.1 K/UL — HIGH (ref 1.8–7.4)
NEUTROPHILS NFR BLD AUTO: 80.4 % — HIGH (ref 43–77)
PLATELET # BLD AUTO: 186 K/UL — SIGNIFICANT CHANGE UP (ref 150–400)
RBC # BLD: 4.36 M/UL — SIGNIFICANT CHANGE UP (ref 3.8–5.2)
RBC # FLD: 12.9 % — SIGNIFICANT CHANGE UP (ref 10.3–14.5)
WBC # BLD: 13.8 K/UL — HIGH (ref 3.8–10.5)
WBC # FLD AUTO: 13.8 K/UL — HIGH (ref 3.8–10.5)

## 2019-08-26 PROCEDURE — 99214 OFFICE O/P EST MOD 30 MIN: CPT

## 2019-09-03 ENCOUNTER — LABORATORY RESULT (OUTPATIENT)
Age: 69
End: 2019-09-03

## 2019-09-05 ENCOUNTER — APPOINTMENT (OUTPATIENT)
Age: 69
End: 2019-09-05

## 2019-09-05 ENCOUNTER — RESULT REVIEW (OUTPATIENT)
Age: 69
End: 2019-09-05

## 2019-09-05 ENCOUNTER — LABORATORY RESULT (OUTPATIENT)
Age: 69
End: 2019-09-05

## 2019-09-05 LAB
BASOPHILS # BLD AUTO: 0.1 K/UL — SIGNIFICANT CHANGE UP (ref 0–0.2)
BASOPHILS NFR BLD AUTO: 1.4 % — SIGNIFICANT CHANGE UP (ref 0–2)
EOSINOPHIL # BLD AUTO: 0.7 K/UL — HIGH (ref 0–0.5)
EOSINOPHIL NFR BLD AUTO: 7.2 % — HIGH (ref 0–6)
HCT VFR BLD CALC: 38.6 % — SIGNIFICANT CHANGE UP (ref 34.5–45)
HGB BLD-MCNC: 14.4 G/DL — SIGNIFICANT CHANGE UP (ref 11.5–15.5)
LYMPHOCYTES # BLD AUTO: 1 K/UL — SIGNIFICANT CHANGE UP (ref 1–3.3)
LYMPHOCYTES # BLD AUTO: 11.4 % — LOW (ref 13–44)
MCHC RBC-ENTMCNC: 33 PG — SIGNIFICANT CHANGE UP (ref 27–34)
MCHC RBC-ENTMCNC: 37.4 G/DL — HIGH (ref 32–36)
MCV RBC AUTO: 88.2 FL — SIGNIFICANT CHANGE UP (ref 80–100)
MONOCYTES # BLD AUTO: 0.5 K/UL — SIGNIFICANT CHANGE UP (ref 0–0.9)
MONOCYTES NFR BLD AUTO: 5.6 % — SIGNIFICANT CHANGE UP (ref 2–14)
NEUTROPHILS # BLD AUTO: 6.7 K/UL — SIGNIFICANT CHANGE UP (ref 1.8–7.4)
NEUTROPHILS NFR BLD AUTO: 74.5 % — SIGNIFICANT CHANGE UP (ref 43–77)
PLATELET # BLD AUTO: 301 K/UL — SIGNIFICANT CHANGE UP (ref 150–400)
RBC # BLD: 4.38 M/UL — SIGNIFICANT CHANGE UP (ref 3.8–5.2)
RBC # FLD: 13.4 % — SIGNIFICANT CHANGE UP (ref 10.3–14.5)
WBC # BLD: 9 K/UL — SIGNIFICANT CHANGE UP (ref 3.8–10.5)
WBC # FLD AUTO: 9 K/UL — SIGNIFICANT CHANGE UP (ref 3.8–10.5)

## 2019-09-06 DIAGNOSIS — R11.2 NAUSEA WITH VOMITING, UNSPECIFIED: ICD-10-CM

## 2019-09-06 DIAGNOSIS — Z51.89 ENCOUNTER FOR OTHER SPECIFIED AFTERCARE: ICD-10-CM

## 2019-09-06 DIAGNOSIS — Z51.11 ENCOUNTER FOR ANTINEOPLASTIC CHEMOTHERAPY: ICD-10-CM

## 2019-09-17 ENCOUNTER — RESULT REVIEW (OUTPATIENT)
Age: 69
End: 2019-09-17

## 2019-09-17 ENCOUNTER — APPOINTMENT (OUTPATIENT)
Dept: HEMATOLOGY ONCOLOGY | Facility: CLINIC | Age: 69
End: 2019-09-17

## 2019-09-17 VITALS
HEART RATE: 85 BPM | WEIGHT: 147.05 LBS | DIASTOLIC BLOOD PRESSURE: 62 MMHG | HEIGHT: 64 IN | TEMPERATURE: 98.4 F | SYSTOLIC BLOOD PRESSURE: 113 MMHG | BODY MASS INDEX: 25.1 KG/M2 | OXYGEN SATURATION: 92 %

## 2019-09-17 LAB
BASOPHILS # BLD AUTO: 0.1 K/UL — SIGNIFICANT CHANGE UP (ref 0–0.2)
BASOPHILS NFR BLD AUTO: 0.8 % — SIGNIFICANT CHANGE UP (ref 0–2)
EOSINOPHIL # BLD AUTO: 0.2 K/UL — SIGNIFICANT CHANGE UP (ref 0–0.5)
EOSINOPHIL NFR BLD AUTO: 1.4 % — SIGNIFICANT CHANGE UP (ref 0–6)
HCT VFR BLD CALC: 34.8 % — SIGNIFICANT CHANGE UP (ref 34.5–45)
HGB BLD-MCNC: 12.3 G/DL — SIGNIFICANT CHANGE UP (ref 11.5–15.5)
LYMPHOCYTES # BLD AUTO: 1.3 K/UL — SIGNIFICANT CHANGE UP (ref 1–3.3)
LYMPHOCYTES # BLD AUTO: 11.6 % — LOW (ref 13–44)
MCHC RBC-ENTMCNC: 31.8 PG — SIGNIFICANT CHANGE UP (ref 27–34)
MCHC RBC-ENTMCNC: 35.3 G/DL — SIGNIFICANT CHANGE UP (ref 32–36)
MCV RBC AUTO: 90.2 FL — SIGNIFICANT CHANGE UP (ref 80–100)
MONOCYTES # BLD AUTO: 0.5 K/UL — SIGNIFICANT CHANGE UP (ref 0–0.9)
MONOCYTES NFR BLD AUTO: 4.3 % — SIGNIFICANT CHANGE UP (ref 2–14)
NEUTROPHILS # BLD AUTO: 9 K/UL — HIGH (ref 1.8–7.4)
NEUTROPHILS NFR BLD AUTO: 81.9 % — HIGH (ref 43–77)
PLATELET # BLD AUTO: 177 K/UL — SIGNIFICANT CHANGE UP (ref 150–400)
RBC # BLD: 3.86 M/UL — SIGNIFICANT CHANGE UP (ref 3.8–5.2)
RBC # FLD: 14.8 % — HIGH (ref 10.3–14.5)
WBC # BLD: 11 K/UL — HIGH (ref 3.8–10.5)
WBC # FLD AUTO: 11 K/UL — HIGH (ref 3.8–10.5)

## 2019-09-18 ENCOUNTER — APPOINTMENT (OUTPATIENT)
Dept: RADIATION ONCOLOGY | Facility: CLINIC | Age: 69
End: 2019-09-18
Payer: MEDICARE

## 2019-09-18 VITALS
SYSTOLIC BLOOD PRESSURE: 110 MMHG | OXYGEN SATURATION: 94 % | HEIGHT: 64 IN | DIASTOLIC BLOOD PRESSURE: 70 MMHG | HEART RATE: 98 BPM | RESPIRATION RATE: 16 BRPM | BODY MASS INDEX: 24.92 KG/M2 | WEIGHT: 146 LBS

## 2019-09-18 PROCEDURE — 99213 OFFICE O/P EST LOW 20 MIN: CPT

## 2019-09-18 NOTE — DISEASE MANAGEMENT
[Pathological] : TNM Stage: p [IVB] : IVB [TTNM] : 3a [FreeTextEntry4] : adenocarcinoma of endometrium [NTNM] : 1 [MTNM] : 1 [de-identified] : 2100 cGy [de-identified] : vaginal cuff

## 2019-09-18 NOTE — ASSESSMENT
[Regional recurrence] : Regional recurrence [FreeTextEntry1] : no appreciable treatment related sequelae.

## 2019-09-18 NOTE — PHYSICAL EXAM
[Normal] : oriented to person, place and time, the affect was normal, the mood was normal and not anxious [FreeTextEntry1] : deferred [de-identified] : deferred

## 2019-09-18 NOTE — HISTORY OF PRESENT ILLNESS
[FreeTextEntry1] : This 69 year-old female returns today s/p radiation therapy (2,100 cG) to the vaginal cuff for [id number], stage lVb adenocarcinoma of the endometrium, completed 8/6/18. Denies vaginal itch, discharge, edema, change in bowel habits, change in urination, pain, fatigue, or weight loss.  Follows with Dr Ochoa and is s/p  6 cycles of carbo/taxol followed by cytoreductive surgery, which was followed by 2 cycles of adjuvant carbo/taxol.  CT scan 12/7/18 showed no evidence of recurrence.  Reports she is not sexually active. \par \par 7/16/19 -- PET CT which shows FDG avid left para-aortic and bilateral internal iliac lymph nodes, suspicious for recurrent/metastatic disease.  As it has been almost 1.5 years since herprior course of  chemotherapy treatment.  Patient has reinitiated chemotherapy and  is now s/p 2 cycles of Carbo/Taxol, last on 9/05/19. Denies fevers, no SOB w/ exertion, continues to smoke 1 PPD, no CP, appetite is good, no constipation or diarrhea, no pain/burning w/ urination.  Denies vaginal discharge or bleeding.  No LE edema. Energy level is good.

## 2019-09-18 NOTE — LETTER CLOSING
[Sincerely yours,] : Sincerely yours, [FreeTextEntry3] : Raleigh Freire MD\par Physician in Chief\par Department of Radiation Medicine\par [facility]\par [facility]\par \par  of Radiation Medicine\par [facility]\par at  [facility]\par \par Radiation \par [facility]/\par [facility]\par [address]\par [place], [place] [address]\par \par Tel: [phone]\par Fax: [phone]\par

## 2019-09-18 NOTE — REVIEW OF SYSTEMS
[Edema Limbs: Grade 0] : Edema Limbs: Grade 0  [Negative] : Allergic/Immunologic [Localized Edema: Grade 0] : Localized Edema: Grade 0  [Fatigue: Grade 0] : Fatigue: Grade 0 [Neck Edema: Grade 0] : Neck Edema: Grade 0 [Urinary Incontinence: Grade 0] : Urinary Incontinence: Grade 0  [Genital Edema: Grade 0] : Genital Edema: Grade 0 [Vaginal Stricture: Grade 0] : Vaginal Stricture: Grade 0 [Vaginal Infection: Grade 0] : Vaginal Infection: Grade 0

## 2019-09-20 ENCOUNTER — OUTPATIENT (OUTPATIENT)
Dept: OUTPATIENT SERVICES | Facility: HOSPITAL | Age: 69
LOS: 1 days | Discharge: HOME | End: 2019-09-20

## 2019-09-20 DIAGNOSIS — C55 MALIGNANT NEOPLASM OF UTERUS, PART UNSPECIFIED: ICD-10-CM

## 2019-09-20 DIAGNOSIS — C54.1 MALIGNANT NEOPLASM OF ENDOMETRIUM: ICD-10-CM

## 2019-09-24 ENCOUNTER — RESULT REVIEW (OUTPATIENT)
Age: 69
End: 2019-09-24

## 2019-09-24 ENCOUNTER — LABORATORY RESULT (OUTPATIENT)
Age: 69
End: 2019-09-24

## 2019-09-24 ENCOUNTER — APPOINTMENT (OUTPATIENT)
Age: 69
End: 2019-09-24

## 2019-09-24 LAB
BASOPHILS # BLD AUTO: 0.1 K/UL — SIGNIFICANT CHANGE UP (ref 0–0.2)
BASOPHILS NFR BLD AUTO: 0.8 % — SIGNIFICANT CHANGE UP (ref 0–2)
EOSINOPHIL # BLD AUTO: 0.1 K/UL — SIGNIFICANT CHANGE UP (ref 0–0.5)
EOSINOPHIL NFR BLD AUTO: 1 % — SIGNIFICANT CHANGE UP (ref 0–6)
HCT VFR BLD CALC: 33.5 % — LOW (ref 34.5–45)
HGB BLD-MCNC: 12.6 G/DL — SIGNIFICANT CHANGE UP (ref 11.5–15.5)
LYMPHOCYTES # BLD AUTO: 0.8 K/UL — LOW (ref 1–3.3)
LYMPHOCYTES # BLD AUTO: 8.2 % — LOW (ref 13–44)
MCHC RBC-ENTMCNC: 33.9 PG — SIGNIFICANT CHANGE UP (ref 27–34)
MCHC RBC-ENTMCNC: 37.7 G/DL — HIGH (ref 32–36)
MCV RBC AUTO: 89.8 FL — SIGNIFICANT CHANGE UP (ref 80–100)
MONOCYTES # BLD AUTO: 0.5 K/UL — SIGNIFICANT CHANGE UP (ref 0–0.9)
MONOCYTES NFR BLD AUTO: 4.8 % — SIGNIFICANT CHANGE UP (ref 2–14)
NEUTROPHILS # BLD AUTO: 8.7 K/UL — HIGH (ref 1.8–7.4)
NEUTROPHILS NFR BLD AUTO: 85.2 % — HIGH (ref 43–77)
PLATELET # BLD AUTO: 194 K/UL — SIGNIFICANT CHANGE UP (ref 150–400)
RBC # BLD: 3.73 M/UL — LOW (ref 3.8–5.2)
RBC # FLD: 16.2 % — HIGH (ref 10.3–14.5)
WBC # BLD: 10.2 K/UL — SIGNIFICANT CHANGE UP (ref 3.8–10.5)
WBC # FLD AUTO: 10.2 K/UL — SIGNIFICANT CHANGE UP (ref 3.8–10.5)

## 2019-09-25 DIAGNOSIS — R11.2 NAUSEA WITH VOMITING, UNSPECIFIED: ICD-10-CM

## 2019-09-25 DIAGNOSIS — E86.0 DEHYDRATION: ICD-10-CM

## 2019-09-25 DIAGNOSIS — Z51.11 ENCOUNTER FOR ANTINEOPLASTIC CHEMOTHERAPY: ICD-10-CM

## 2019-09-27 ENCOUNTER — RESULT REVIEW (OUTPATIENT)
Age: 69
End: 2019-09-27

## 2019-09-27 ENCOUNTER — APPOINTMENT (OUTPATIENT)
Dept: HEMATOLOGY ONCOLOGY | Facility: CLINIC | Age: 69
End: 2019-09-27
Payer: MEDICARE

## 2019-09-27 VITALS
HEIGHT: 64 IN | DIASTOLIC BLOOD PRESSURE: 61 MMHG | SYSTOLIC BLOOD PRESSURE: 98 MMHG | WEIGHT: 144 LBS | HEART RATE: 66 BPM | BODY MASS INDEX: 24.59 KG/M2 | OXYGEN SATURATION: 95 % | TEMPERATURE: 98.5 F

## 2019-09-27 LAB
ANISOCYTOSIS BLD QL: SLIGHT — SIGNIFICANT CHANGE UP
BASOPHILS # BLD AUTO: 0.1 K/UL — SIGNIFICANT CHANGE UP (ref 0–0.2)
EOSINOPHIL # BLD AUTO: 0.1 K/UL — SIGNIFICANT CHANGE UP (ref 0–0.5)
HCT VFR BLD CALC: 32.4 % — LOW (ref 34.5–45)
HGB BLD-MCNC: 12.1 G/DL — SIGNIFICANT CHANGE UP (ref 11.5–15.5)
LYMPHOCYTES # BLD AUTO: 0.6 K/UL — LOW (ref 1–3.3)
LYMPHOCYTES # BLD AUTO: 1 % — LOW (ref 13–44)
MACROCYTES BLD QL: SLIGHT — SIGNIFICANT CHANGE UP
MCHC RBC-ENTMCNC: 33.6 PG — SIGNIFICANT CHANGE UP (ref 27–34)
MCHC RBC-ENTMCNC: 37.3 G/DL — HIGH (ref 32–36)
MCV RBC AUTO: 90.1 FL — SIGNIFICANT CHANGE UP (ref 80–100)
METAMYELOCYTES # FLD: 2 % — HIGH (ref 0–0)
MICROCYTES BLD QL: SLIGHT — SIGNIFICANT CHANGE UP
MONOCYTES # BLD AUTO: 0.3 K/UL — SIGNIFICANT CHANGE UP (ref 0–0.9)
MONOCYTES NFR BLD AUTO: 1 % — LOW (ref 2–14)
NEUTROPHILS # BLD AUTO: 25.3 K/UL — HIGH (ref 1.8–7.4)
NEUTROPHILS NFR BLD AUTO: 94 % — HIGH (ref 43–77)
NEUTS BAND # BLD: 1 % — SIGNIFICANT CHANGE UP (ref 0–8)
NEUTS HYPERSEG # BLD: PRESENT — SIGNIFICANT CHANGE UP
PLAT MORPH BLD: NORMAL — SIGNIFICANT CHANGE UP
PLATELET # BLD AUTO: 143 K/UL — LOW (ref 150–400)
POIKILOCYTOSIS BLD QL AUTO: SLIGHT — SIGNIFICANT CHANGE UP
RBC # BLD: 3.6 M/UL — LOW (ref 3.8–5.2)
RBC # FLD: 15.1 % — HIGH (ref 10.3–14.5)
RBC BLD AUTO: SIGNIFICANT CHANGE UP
TOXIC GRANULES BLD QL SMEAR: PRESENT — SIGNIFICANT CHANGE UP
VARIANT LYMPHS # BLD: 1 % — SIGNIFICANT CHANGE UP (ref 0–6)
WBC # BLD: 26.2 K/UL — HIGH (ref 3.8–10.5)
WBC # FLD AUTO: 26.2 K/UL — HIGH (ref 3.8–10.5)

## 2019-09-27 PROCEDURE — 99213 OFFICE O/P EST LOW 20 MIN: CPT

## 2019-09-30 DIAGNOSIS — Z51.89 ENCOUNTER FOR OTHER SPECIFIED AFTERCARE: ICD-10-CM

## 2019-10-03 ENCOUNTER — APPOINTMENT (OUTPATIENT)
Dept: SURGICAL ONCOLOGY | Facility: CLINIC | Age: 69
End: 2019-10-03
Payer: MEDICARE

## 2019-10-03 VITALS
SYSTOLIC BLOOD PRESSURE: 114 MMHG | WEIGHT: 148 LBS | BODY MASS INDEX: 25.27 KG/M2 | HEART RATE: 78 BPM | DIASTOLIC BLOOD PRESSURE: 66 MMHG | HEIGHT: 64 IN

## 2019-10-03 DIAGNOSIS — Z80.41 FAMILY HISTORY OF MALIGNANT NEOPLASM OF OVARY: ICD-10-CM

## 2019-10-03 DIAGNOSIS — D49.0 NEOPLASM OF UNSPECIFIED BEHAVIOR OF DIGESTIVE SYSTEM: ICD-10-CM

## 2019-10-03 DIAGNOSIS — Z80.3 FAMILY HISTORY OF MALIGNANT NEOPLASM OF BREAST: ICD-10-CM

## 2019-10-03 PROCEDURE — 99204 OFFICE O/P NEW MOD 45 MIN: CPT

## 2019-10-03 NOTE — HISTORY OF PRESENT ILLNESS
[de-identified] : Ms. LISA CHO  is a 69 year  old female  presenting for an initial consultation for an incidentally found IPMN on imaging, referred by ISAIAH Priest/Dr. Ochoa. 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 Jaimes.  She had / lymph nodes with metastatic adenocarcinoma.  She received adjuvant chemotherapy under the care of Dr. Ochoa. She also underwent a restaging CT C/A/P in 2019 showing a 1.9 cm area of heterogenous enhancement in the pancreatic body and interval development of left para aortic adenopathy suspicious for metastatic disease.  \par \par PET CT in 2019, confirmed suspicious left para-aortic and bilateral internal iliac lymph nodes. No discrete FDG avidity corresponding to abnormal pancreatic enhancement on CT.  MRI was recommended.  She was restarted on chemotherapy which she is currently receiving.  \par \par MRI Abdomen w/ contrast on 19 - 1.3 x 1.1 x 1.8 cm septated cystic lesion in the pancreatic body in close proximity to the pancreatic duct corresponding to the area of heterogenous enhancement on CT scan. The MRI appearance suggests a side branch intraductal papillary mucinous neoplasm (IPMN). There are a couple of 3 mm unilocular cysts in the pancreatic body. The pancreatic duct is normal in caliber.  Left para-aortic adenopathy which remains stable. \par \par She is a current every day smoker, 1 ppd x 30 years.  Occasional alcohol use.   (age at first pregnancy -25 y/o). \par Family history- Sister with breast cancer and uterine cancer.\par \par Denies abdominal pain, nausea, vomiting, changes in appetite or weight.

## 2019-10-03 NOTE — ASSESSMENT
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  This is likely a branch duct IPMN, low risk < 3 cm, without mural nodularity, with a normal main pancreatic duct.  This is extremely low risk for malignancy and can be observed.\par \par PLAN:\par 1) Repeat MRI Abdomen w/ contrast in 8/2020\par 2) RTO after MRI \par 3) Chemo as per Dr. Ochoa for Uterine cancer

## 2019-10-03 NOTE — PHYSICAL EXAM
[Normal] : supple, no neck mass and thyroid not enlarged [Normal] : oriented to person, place and time, with appropriate affect [de-identified] : soft, ND, NT

## 2019-10-15 ENCOUNTER — APPOINTMENT (OUTPATIENT)
Dept: HEMATOLOGY ONCOLOGY | Facility: CLINIC | Age: 69
End: 2019-10-15
Payer: MEDICARE

## 2019-10-15 ENCOUNTER — RESULT REVIEW (OUTPATIENT)
Age: 69
End: 2019-10-15

## 2019-10-15 ENCOUNTER — LABORATORY RESULT (OUTPATIENT)
Age: 69
End: 2019-10-15

## 2019-10-15 ENCOUNTER — APPOINTMENT (OUTPATIENT)
Age: 69
End: 2019-10-15

## 2019-10-15 DIAGNOSIS — F41.9 ANXIETY DISORDER, UNSPECIFIED: ICD-10-CM

## 2019-10-15 DIAGNOSIS — R51 HEADACHE: ICD-10-CM

## 2019-10-15 PROBLEM — E83.42 HYPOMAGNESEMIA: Status: ACTIVE | Noted: 2019-10-15

## 2019-10-15 LAB
BASOPHILS # BLD AUTO: 0.1 K/UL — SIGNIFICANT CHANGE UP (ref 0–0.2)
BASOPHILS NFR BLD AUTO: 1.1 % — SIGNIFICANT CHANGE UP (ref 0–2)
EOSINOPHIL # BLD AUTO: 0.1 K/UL — SIGNIFICANT CHANGE UP (ref 0–0.5)
EOSINOPHIL NFR BLD AUTO: 1.6 % — SIGNIFICANT CHANGE UP (ref 0–6)
HCT VFR BLD CALC: 33.7 % — LOW (ref 34.5–45)
HGB BLD-MCNC: 12.3 G/DL — SIGNIFICANT CHANGE UP (ref 11.5–15.5)
LYMPHOCYTES # BLD AUTO: 0.8 K/UL — LOW (ref 1–3.3)
LYMPHOCYTES # BLD AUTO: 11.3 % — LOW (ref 13–44)
MCHC RBC-ENTMCNC: 34.3 PG — HIGH (ref 27–34)
MCHC RBC-ENTMCNC: 36.6 G/DL — HIGH (ref 32–36)
MCV RBC AUTO: 93.7 FL — SIGNIFICANT CHANGE UP (ref 80–100)
MONOCYTES # BLD AUTO: 0.4 K/UL — SIGNIFICANT CHANGE UP (ref 0–0.9)
MONOCYTES NFR BLD AUTO: 6.1 % — SIGNIFICANT CHANGE UP (ref 2–14)
NEUTROPHILS # BLD AUTO: 5.7 K/UL — SIGNIFICANT CHANGE UP (ref 1.8–7.4)
NEUTROPHILS NFR BLD AUTO: 79.9 % — HIGH (ref 43–77)
PLATELET # BLD AUTO: 216 K/UL — SIGNIFICANT CHANGE UP (ref 150–400)
RBC # BLD: 3.6 M/UL — LOW (ref 3.8–5.2)
RBC # FLD: 17.5 % — HIGH (ref 10.3–14.5)
WBC # BLD: 7.1 K/UL — SIGNIFICANT CHANGE UP (ref 3.8–10.5)
WBC # FLD AUTO: 7.1 K/UL — SIGNIFICANT CHANGE UP (ref 3.8–10.5)

## 2019-10-15 PROCEDURE — 99214 OFFICE O/P EST MOD 30 MIN: CPT

## 2019-10-15 RX ORDER — OMEGA-3/DHA/EPA/FISH OIL 300-1000MG
400 CAPSULE ORAL
Qty: 90 | Refills: 0 | Status: ACTIVE | COMMUNITY
Start: 2019-10-15 | End: 1900-01-01

## 2019-10-15 NOTE — PHYSICAL EXAM
[Restricted in physically strenuous activity but ambulatory and able to carry out work of a light or sedentary nature] : Status 1- Restricted in physically strenuous activity but ambulatory and able to carry out work of a light or sedentary nature, e.g., light house work, office work [Normal] : grossly intact [de-identified] : Interactive, well groomed, in NAD [de-identified] : negative cervical, supra/infraclavicular adenopathy. [de-identified] : clear bilaterally [de-identified] : S1/S2 RR [de-identified] : BS+, soft, nontender on palpation. [de-identified] : No edema [de-identified] : Forgetful [de-identified] : without spinal or cva tenderness.

## 2019-10-15 NOTE — ASSESSMENT
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 She received 6 cycles of carbo-taxol followed by cytoreductive surgery, followed by 2 cycles of adjuvant carbo-taxol. C9 omitted given prolonged cytopenias.  She completed vaginal cuff brachytherapy on 8/6/18.  7/16/19 PET CT which shows FDG avid left para-aortic and bilateral internal iliac lymph nodes, suspicious for recurrent/metastatic disease.  Since it has almost been 1.5 years since her last treatment, recommendation made for platinum sensitive recurrent disease with carbo AUC 5 and Taxol 160 mg/m2 every 3 weeks with Onpro support. \par [employer] [facility] testing (results 8/13/19) shows no reportable alterations w/  diagnostic claims.\par MS-Stable, TMB - low (5 Muts/Mb)\par \par C1 Carbo/Taxol/Onpro 8/12/19\par C2 Carbo/Taxol/Onpro 9/05/19\par C3 Carbo/Taxol/Onpro 9/24/19\par \par S/p 3 cycles of Carbo/taxol (retreat). WBC 7.1, Hgb 12.3, PLT 216K. She has been refusing B12 injection. Tolerating well. \par \par Plan:\par - Proceed w/ C4 Carbo/Taxol.\par - Restage after C4.\par - Smoking: still smoking 1ppd of cigarettes, not interested in strategies to quit.\par - Magnesium: 1.5, 1 Gm Mag+ Cl- IV today, rx for Mag+ oxide 400 mg daily.\par - F/u in office in 3 weeks.\par Addendum: friend came in at end of visit and mentions that family is concerned about her ++ forgetfulness. She has a consult w/ neuropsych ext week. She has been non-focal on exam, friend mentions she has persistent H/A's. Will order brain MRI to r/o mets. Patient requests it to be done w/ sedation.

## 2019-10-15 NOTE — HISTORY OF PRESENT ILLNESS
[Disease: _____________________] : Disease: [unfilled] [M: ___] : M[unfilled] [AJCC Stage: ____] : AJCC Stage: [unfilled] [de-identified] : Ms. Haas is a 67 year old female with recently diagnosed endometrial cancer. She had an abnormal Pap smear on May 10, 2017, which showed HGSIL, positive HPV 16. On May 30, 2017, she underwent colposcopic examination. Cervical biopsies were performed with anterior cervical biopsy showing LGSIL and smaller fragments, suspicious for HGSIL; posterior cervical biopsy showing small focus of HGSIL. Endocervical curettage showed papillary serous carcinoma with psamomma body formation, and a small separate fragment of dysplastic squamous epithelium, faver high-grade squamous intraepithelial lesion (CORDELIA 2/moderate dysplasia).\par \par 20392 MRI pelvis - There is abnormal increased signal in the lower uterine segment endometrial canal also involving the upper portion of the cervical canal. This is secondary \par to a hypoenhancing mass in this region measuring 1.8 x 0.9 x 1.9 cm.The left ovary/left adnexal region is enlarged and heterogeneous in appearance measuring 4.1 x 3.6 x 2.6 cm. It is in close proximity to the sigmoid colon which demonstrates extensive diverticulosis and a focal fluid collection inseparable from the wall of the sigmoid colon adjacent to the left ovary. This focal fluid collection measures 2.3 x 1.8 cm and demonstrates prominent enhancement of the tissue surrounding it which is inseparable from enhancement adjacent to the left ovary. Patient is reportedly asymptomatic but these findings may represent subacute sigmoid diverticulitis with intramural/serosal sigmoid fluid collection/abscess which secondarily involves the left ovary. Neoplastic disease in this region is felt to be less likely.There is a 1.4 cm enhancing polypoid lesion inseparable from the cecum likely arising from the serosal surface which is suspicious for a tumor implant. Lymph nodes within pelvis are normal in size.  There is an abnormal appearing left inguinal lymph node measuring 2.4 x 2.1 cm.   \par \par 6/23/17 PET CT -  FDG avid left inguinal lymph node, 2.5 x 2.1 cm (SUV 10.6; image 210). FDG avid left para-aortic lymph node, 1.4 x 1.3 cm (SUV 10.9; image 158).\par 1.3 x 1.2 cm soft tissue nodule along the serosal surface of the cecum (SUV 13.0; image 209), as well as an additional nodule 1.2 x 1.1 cm (image 208; SUV 11.6). FDG avidity in the sigmoid colon in an area of diverticulosis SUV 12.2 (image 203).Focus of FDG avidity in the endometrial cavity (SUV 6.7; \par image 210) .\par \par s/p cytoreduction on 12/18/17.  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 Denies fevers, no SOB, no CP, appetite is good, no c/o mouth soreness, no constipation or diarrhea, no pain/burning w/ urination. No LE edema. Energy level is fair to good. Denies numbness/tingling to hands or feet. Continues to smoke ~ 1PPD of cigarettes. The remainder of ROS is negative.\par

## 2019-10-23 ENCOUNTER — FORM ENCOUNTER (OUTPATIENT)
Age: 69
End: 2019-10-23

## 2019-10-24 ENCOUNTER — APPOINTMENT (OUTPATIENT)
Dept: CT IMAGING | Facility: CLINIC | Age: 69
End: 2019-10-24
Payer: MEDICARE

## 2019-10-24 ENCOUNTER — OUTPATIENT (OUTPATIENT)
Dept: OUTPATIENT SERVICES | Facility: HOSPITAL | Age: 69
LOS: 1 days | Discharge: HOME | End: 2019-10-24

## 2019-10-24 ENCOUNTER — OUTPATIENT (OUTPATIENT)
Dept: OUTPATIENT SERVICES | Facility: HOSPITAL | Age: 69
LOS: 1 days | End: 2019-10-24

## 2019-10-24 DIAGNOSIS — C55 MALIGNANT NEOPLASM OF UTERUS, PART UNSPECIFIED: ICD-10-CM

## 2019-10-24 DIAGNOSIS — C54.1 MALIGNANT NEOPLASM OF ENDOMETRIUM: ICD-10-CM

## 2019-10-24 PROCEDURE — 71260 CT THORAX DX C+: CPT | Mod: 26

## 2019-10-24 PROCEDURE — 74177 CT ABD & PELVIS W/CONTRAST: CPT | Mod: 26

## 2019-10-31 ENCOUNTER — FORM ENCOUNTER (OUTPATIENT)
Age: 69
End: 2019-10-31

## 2019-11-01 ENCOUNTER — OUTPATIENT (OUTPATIENT)
Dept: OUTPATIENT SERVICES | Facility: HOSPITAL | Age: 69
LOS: 1 days | End: 2019-11-01

## 2019-11-01 ENCOUNTER — APPOINTMENT (OUTPATIENT)
Dept: MRI IMAGING | Facility: CLINIC | Age: 69
End: 2019-11-01
Payer: MEDICARE

## 2019-11-01 DIAGNOSIS — C55 MALIGNANT NEOPLASM OF UTERUS, PART UNSPECIFIED: ICD-10-CM

## 2019-11-01 PROCEDURE — 70553 MRI BRAIN STEM W/O & W/DYE: CPT | Mod: 26

## 2019-11-05 ENCOUNTER — RESULT REVIEW (OUTPATIENT)
Age: 69
End: 2019-11-05

## 2019-11-05 ENCOUNTER — APPOINTMENT (OUTPATIENT)
Dept: HEMATOLOGY ONCOLOGY | Facility: CLINIC | Age: 69
End: 2019-11-05
Payer: MEDICARE

## 2019-11-05 ENCOUNTER — APPOINTMENT (OUTPATIENT)
Age: 69
End: 2019-11-05

## 2019-11-05 ENCOUNTER — LABORATORY RESULT (OUTPATIENT)
Age: 69
End: 2019-11-05

## 2019-11-05 LAB
BASOPHILS # BLD AUTO: 0.1 K/UL — SIGNIFICANT CHANGE UP (ref 0–0.2)
BASOPHILS NFR BLD AUTO: 0.9 % — SIGNIFICANT CHANGE UP (ref 0–2)
EOSINOPHIL # BLD AUTO: 0.1 K/UL — SIGNIFICANT CHANGE UP (ref 0–0.5)
EOSINOPHIL NFR BLD AUTO: 1.8 % — SIGNIFICANT CHANGE UP (ref 0–6)
HCT VFR BLD CALC: 33.2 % — LOW (ref 34.5–45)
HGB BLD-MCNC: 11.8 G/DL — SIGNIFICANT CHANGE UP (ref 11.5–15.5)
LYMPHOCYTES # BLD AUTO: 1.2 K/UL — SIGNIFICANT CHANGE UP (ref 1–3.3)
LYMPHOCYTES # BLD AUTO: 16.2 % — SIGNIFICANT CHANGE UP (ref 13–44)
MCHC RBC-ENTMCNC: 34.4 PG — HIGH (ref 27–34)
MCHC RBC-ENTMCNC: 35.5 G/DL — SIGNIFICANT CHANGE UP (ref 32–36)
MCV RBC AUTO: 96.8 FL — SIGNIFICANT CHANGE UP (ref 80–100)
MONOCYTES # BLD AUTO: 0.5 K/UL — SIGNIFICANT CHANGE UP (ref 0–0.9)
MONOCYTES NFR BLD AUTO: 6.3 % — SIGNIFICANT CHANGE UP (ref 2–14)
NEUTROPHILS # BLD AUTO: 5.4 K/UL — SIGNIFICANT CHANGE UP (ref 1.8–7.4)
NEUTROPHILS NFR BLD AUTO: 74.8 % — SIGNIFICANT CHANGE UP (ref 43–77)
PLATELET # BLD AUTO: 170 K/UL — SIGNIFICANT CHANGE UP (ref 150–400)
RBC # BLD: 3.43 M/UL — LOW (ref 3.8–5.2)
RBC # FLD: 17.4 % — HIGH (ref 10.3–14.5)
WBC # BLD: 7.2 K/UL — SIGNIFICANT CHANGE UP (ref 3.8–10.5)
WBC # FLD AUTO: 7.2 K/UL — SIGNIFICANT CHANGE UP (ref 3.8–10.5)

## 2019-11-05 PROCEDURE — 99214 OFFICE O/P EST MOD 30 MIN: CPT

## 2019-11-05 NOTE — ADDENDUM
[FreeTextEntry1] : I, Salvador Latham, solely acted as scribe for Dr. Yogi Price on 11/05/2019.\par

## 2019-11-05 NOTE — ASSESSMENT
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 She received 6 cycles of carbo-taxol followed by cytoreductive surgery, followed by 2 cycles of adjuvant carbo-taxol. C9 omitted given prolonged cytopenias.  She completed vaginal cuff brachytherapy on 8/6/18.  7/16/19 PET CT which shows FDG avid left para-aortic and bilateral internal iliac lymph nodes, suspicious for recurrent/metastatic disease.  Since it has almost been 1.5 years since her last treatment, recommendation made for platinum sensitive recurrent disease with carbo AUC 5 and Taxol 160 mg/m2 every 3 weeks with Onpro support. \par [employer] One testing (results 8/13/19) shows no reportable alterations w/  diagnostic claims.\par MS-Stable, TMB - low (5 Muts/Mb)\par \par C1 Carbo/Taxol/Onpro 8/12/19\par C2 Carbo/Taxol/Onpro 9/05/19\par C3 Carbo/Taxol/Onpro 9/24/19\par C4 Carbo/Taxol/Onpro 10/15/19\par C5 Carbo/Taxol/Onpro 11/5/19\par \par Plan:\par -Continue on current treatment \par -Follow with Dr. Ochoa\par \par

## 2019-11-05 NOTE — HISTORY OF PRESENT ILLNESS
[Disease: _____________________] : Disease: [unfilled] [M: ___] : M[unfilled] [AJCC Stage: ____] : AJCC Stage: [unfilled] [de-identified] : Ms. Haas is a 69 year old female with endometrial cancer. She had an abnormal Pap smear on May 10, 2017, which showed HGSIL, positive HPV 16. On May 30, 2017, she underwent colposcopic examination. Cervical biopsies were performed with anterior cervical biopsy showing LGSIL and smaller fragments, suspicious for HGSIL; posterior cervical biopsy showing small focus of HGSIL. Endocervical curettage showed papillary serous carcinoma with psamomma body formation, and a small separate fragment of dysplastic squamous epithelium, faver high-grade squamous intraepithelial lesion (CORDELIA 2/moderate dysplasia).\par \par 6/23/17 MRI pelvis - There is abnormal increased signal in the lower uterine segment endometrial canal also involving the upper portion of the cervical canal. This is secondary \par to a hypoenhancing mass in this region measuring 1.8 x 0.9 x 1.9 cm.The left ovary/left adnexal region is enlarged and heterogeneous in appearance measuring 4.1 x 3.6 x 2.6 cm. It is in close proximity to the sigmoid colon which demonstrates extensive diverticulosis and a focal fluid collection inseparable from the wall of the sigmoid colon adjacent to the left ovary. This focal fluid collection measures 2.3 x 1.8 cm and demonstrates prominent enhancement of the tissue surrounding it which is inseparable from enhancement adjacent to the left ovary. Patient is reportedly asymptomatic but these findings may represent subacute sigmoid diverticulitis with intramural/serosal sigmoid fluid collection/abscess which secondarily involves the left ovary. Neoplastic disease in this region is felt to be less likely.There is a 1.4 cm enhancing polypoid lesion inseparable from the cecum likely arising from the serosal surface which is suspicious for a tumor implant. Lymph nodes within pelvis are normal in size.  There is an abnormal appearing left inguinal lymph node measuring 2.4 x 2.1 cm.   \par \par 6/23/17 PET CT -  FDG avid left inguinal lymph node, 2.5 x 2.1 cm (SUV 10.6; image 210). FDG avid left para-aortic lymph node, 1.4 x 1.3 cm (SUV 10.9; image 158).\par 1.3 x 1.2 cm soft tissue nodule along the serosal surface of the cecum (SUV 13.0; image 209), as well as an additional nodule 1.2 x 1.1 cm (image 208; SUV 11.6). FDG avidity in the sigmoid colon in an area of diverticulosis SUV 12.2 (image 203).Focus of FDG avidity in the endometrial cavity (SUV 6.7; \par image 210) .\par \par s/p cytoreduction on 12/18/17.  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 \par MMR proficient.\par  [de-identified] : papillary serous carcinoma with psamomma body formation [de-identified] : Receiving C5 Carbo/Taxol today. \par Feeling well today. \par \par CT C/A/P 10/24/19:\par -Since the prior CT scan dated 7/5/2019, the previously enlarged left para-aortic lymph node is now normal in size. \par -Stable right lower lobe lung nodule.

## 2019-11-06 DIAGNOSIS — Z51.11 ENCOUNTER FOR ANTINEOPLASTIC CHEMOTHERAPY: ICD-10-CM

## 2019-11-06 DIAGNOSIS — R11.2 NAUSEA WITH VOMITING, UNSPECIFIED: ICD-10-CM

## 2019-11-06 DIAGNOSIS — Z51.89 ENCOUNTER FOR OTHER SPECIFIED AFTERCARE: ICD-10-CM

## 2019-11-08 ENCOUNTER — RESULT REVIEW (OUTPATIENT)
Age: 69
End: 2019-11-08

## 2019-11-08 ENCOUNTER — APPOINTMENT (OUTPATIENT)
Age: 69
End: 2019-11-08

## 2019-11-08 LAB
ANISOCYTOSIS BLD QL: SLIGHT — SIGNIFICANT CHANGE UP
BASOPHILS # BLD AUTO: 0.1 K/UL — SIGNIFICANT CHANGE UP (ref 0–0.2)
EOSINOPHIL # BLD AUTO: 0.2 K/UL — SIGNIFICANT CHANGE UP (ref 0–0.5)
EOSINOPHIL NFR BLD AUTO: 3 % — SIGNIFICANT CHANGE UP (ref 0–6)
HCT VFR BLD CALC: 30.7 % — LOW (ref 34.5–45)
HGB BLD-MCNC: 11.4 G/DL — LOW (ref 11.5–15.5)
LYMPHOCYTES # BLD AUTO: 0.7 K/UL — LOW (ref 1–3.3)
LYMPHOCYTES # BLD AUTO: 4 % — LOW (ref 13–44)
MACROCYTES BLD QL: SLIGHT — SIGNIFICANT CHANGE UP
MCHC RBC-ENTMCNC: 36.5 PG — HIGH (ref 27–34)
MCHC RBC-ENTMCNC: 37.3 G/DL — HIGH (ref 32–36)
MCV RBC AUTO: 97.9 FL — SIGNIFICANT CHANGE UP (ref 80–100)
MICROCYTES BLD QL: SLIGHT — SIGNIFICANT CHANGE UP
MONOCYTES # BLD AUTO: 0.3 K/UL — SIGNIFICANT CHANGE UP (ref 0–0.9)
MONOCYTES NFR BLD AUTO: 2 % — SIGNIFICANT CHANGE UP (ref 2–14)
MYELOCYTES NFR BLD: 2 % — HIGH (ref 0–0)
NEUTROPHILS # BLD AUTO: 28.6 K/UL — HIGH (ref 1.8–7.4)
NEUTROPHILS NFR BLD AUTO: 89 % — HIGH (ref 43–77)
PLAT MORPH BLD: NORMAL — SIGNIFICANT CHANGE UP
PLATELET # BLD AUTO: 104 K/UL — LOW (ref 150–400)
POIKILOCYTOSIS BLD QL AUTO: SLIGHT — SIGNIFICANT CHANGE UP
RBC # BLD: 3.14 M/UL — LOW (ref 3.8–5.2)
RBC # FLD: 16.5 % — HIGH (ref 10.3–14.5)
RBC BLD AUTO: SIGNIFICANT CHANGE UP
SMUDGE CELLS # BLD: PRESENT — SIGNIFICANT CHANGE UP
TOXIC GRANULES BLD QL SMEAR: PRESENT — SIGNIFICANT CHANGE UP
WBC # BLD: 29.9 K/UL — HIGH (ref 3.8–10.5)
WBC # FLD AUTO: 29.9 K/UL — HIGH (ref 3.8–10.5)

## 2019-11-12 NOTE — PHYSICAL EXAM
[Normal] : affect appropriate [Ambulatory and capable of all self care but unable to carry out any work activities] : Status 2- Ambulatory and capable of all self care but unable to carry out any work activities. Up and about more than 50% of waking hours [de-identified] : NAD [de-identified] : supple [de-identified] : clear bilaterally [de-identified] : S1/S2 RR [de-identified] : No edema [de-identified] : soft, non tender, non distended

## 2019-11-12 NOTE — ASSESSMENT
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 She received 6 cycles of carbo-taxol followed by cytoreductive surgery, followed by 2 cycles of adjuvant carbo-taxol. C9 omitted given prolonged cytopenias.  She completed vaginal cuff brachytherapy on 8/6/18.  7/16/19 PET CT which shows FDG avid left para-aortic and bilateral internal iliac lymph nodes, suspicious for recurrent/metastatic disease.  Since it has almost been 1.5 years since her last treatment, recommendation made for platinum sensitive recurrent disease with carbo AUC 5 and Taxol 160 mg/m2 every 3 weeks with Onpro support. \par [facility] One testing (results 8/13/19) shows no reportable alterations w/  diagnostic claims.\par MS-Stable, TMB - low (5 Muts/Mb).\par \par S/p C1 Carbo retreat/Taxol/Onpro 8/12/19. WBC today 13.8 (likely secondary to OnPro), hgb 14.3 g/dL, PLT 186K\par Tolerated well. \par \par Plan:\par - Proceed C2 Carbo/Taxol/Onpro 9/05/19\par - Restage after C4.\par - Smoking: still smoking 1ppd of cigarettes, not interested in strategies to quit.\par - F/u in office in 3 weeks, or sooner for any questions/concerns.

## 2019-11-12 NOTE — HISTORY OF PRESENT ILLNESS
[Disease: _____________________] : Disease: [unfilled] [M: ___] : M[unfilled] [AJCC Stage: ____] : AJCC Stage: [unfilled] [de-identified] : Ms. Haas is a 67 year old female with recently diagnosed endometrial cancer. She had an abnormal Pap smear on May 10, 2017, which showed HGSIL, positive HPV 16. On May 30, 2017, she underwent colposcopic examination. Cervical biopsies were performed with anterior cervical biopsy showing LGSIL and smaller fragments, suspicious for HGSIL; posterior cervical biopsy showing small focus of HGSIL. Endocervical curettage showed papillary serous carcinoma with psamomma body formation, and a small separate fragment of dysplastic squamous epithelium, faver high-grade squamous intraepithelial lesion (CORDELIA 2/moderate dysplasia).\par \par [id number] MRI pelvis - There is abnormal increased signal in the lower uterine segment endometrial canal also involving the upper portion of the cervical canal. This is secondary \par to a hypoenhancing mass in this region measuring 1.8 x 0.9 x 1.9 cm.The left ovary/left adnexal region is enlarged and heterogeneous in appearance measuring 4.1 x 3.6 x 2.6 cm. It is in close proximity to the sigmoid colon which demonstrates extensive diverticulosis and a focal fluid collection inseparable from the wall of the sigmoid colon adjacent to the left ovary. This focal fluid collection measures 2.3 x 1.8 cm and demonstrates prominent enhancement of the tissue surrounding it which is inseparable from enhancement adjacent to the left ovary. Patient is reportedly asymptomatic but these findings may represent subacute sigmoid diverticulitis with intramural/serosal sigmoid fluid collection/abscess which secondarily involves the left ovary. Neoplastic disease in this region is felt to be less likely.There is a 1.4 cm enhancing polypoid lesion inseparable from the cecum likely arising from the serosal surface which is suspicious for a tumor implant. Lymph nodes within pelvis are normal in size.  There is an abnormal appearing left inguinal lymph node measuring 2.4 x 2.1 cm.   \par \par 6/23/17 PET CT -  FDG avid left inguinal lymph node, 2.5 x 2.1 cm (SUV 10.6; image 210). FDG avid left para-aortic lymph node, 1.4 x 1.3 cm (SUV 10.9; image 158).\par 1.3 x 1.2 cm soft tissue nodule along the serosal surface of the cecum (SUV 13.0; image 209), as well as an additional nodule 1.2 x 1.1 cm (image 208; SUV 11.6). FDG avidity in the sigmoid colon in an area of diverticulosis SUV 12.2 (image 203).Focus of FDG avidity in the endometrial cavity (SUV 6.7; \par image 210) .\par \par s/p cytoreduction on 12/18/17.  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 \par MMR proficient.\par  [de-identified] : papillary serous carcinoma with psamomma body formation [de-identified] : S/p 2 cycles of Carbo/Taxol, last on 9/05/19. Has f/u w/ Dr. Freire tomorrow. Denies fevers, no SOB w/ exertion, continues to smoke 1 PPD, no CP, appetite is good, no constipation or diarrhea, no pain/burning w/ urination. No vaginal discharge or bleeding. No LE edema. Energy level is good. The remainder of ROS is negative.

## 2019-11-12 NOTE — PHYSICAL EXAM
[Ambulatory and capable of all self care but unable to carry out any work activities] : Status 2- Ambulatory and capable of all self care but unable to carry out any work activities. Up and about more than 50% of waking hours [Normal] : pharynx is unremarkable, moist mucus membrane, no oral lesions [de-identified] : Forgetful, interactive, well groomed, in NAD [de-identified] : negative cervical, supra/infraclavicular adenopathy. [de-identified] : S1/S2 RR [de-identified] : clear bilaterally [de-identified] : negative pedal edema or calve tenderness [de-identified] : soft, non tender, non distended [de-identified] : forgetful

## 2019-11-12 NOTE — ASSESSMENT
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 She received 6 cycles of carbo-taxol followed by cytoreductive surgery, followed by 2 cycles of adjuvant carbo-taxol.   C9 omitted given prolonged cytopenias.  She completed vaginal cuff brachytherapy on 8/6/18. \par On 7/16/19 PET CT shows FDG avid left para-aortic and bilateral internal iliac lymph nodes, suspicious for recurrent/metastatic disease.  As it has been almost 1.5 years since her last treatment, we discussed resuming chemotherapy for platinum sensitive recurrent disease with carbo AUC 5 and Taxol 160 mg/m2 every 3 weeks with OnPro support. MRI abdomen (to evaluate pancreas) on 8/09/19 suggests a side branch intraductal papillary mucinous neoplasm (IPMN).[facility] One testing (results 8/13/19) shows no reportable alterations w/  diagnostic claims. MS-Stable, TMB - low (5 Muts/Mb). \par \par S/p C3 Carbo retreat/Taxol/Onpro 9/24/19. WBC today 26.2, she is only 3 days post OnPro, hgb 12.1 g/dL, PLT 143K. C/o mouth soreness, no ulceration in mouth, or white patches, L buccal membrane and lateral tongue is erythemic.\par \par Plan:\par - Leukocytosis likely secondary to 72 hrs post OnPro. \par - Recommended salt and baking soda warm water rinses\par - Restage CT c/a/p post C4 to evaluate response to tx. \par - RTO on 10/15/19, or sooner for any questions/concerns, especially fever.

## 2019-11-12 NOTE — ASSESSMENT
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 She received 6 cycles of carbo-taxol followed by cytoreductive surgery, followed by 2 cycles of adjuvant carbo-taxol.   C9 omitted given prolonged cytopenias.  She completed vaginal cuff brachytherapy on 8/6/18. \par On 7/16/19 PET CT shows FDG avid left para-aortic and bilateral internal iliac lymph nodes, suspicious for recurrent/metastatic disease.  As it has been almost 1.5 years since her last treatment, we discussed resuming chemotherapy for platinum sensitive recurrent disease with carbo AUC 5 and Taxol 160 mg/m2 every 3 weeks with OnPro support. MRI abdomen (to evaluate pancreas) on 8/09/19 suggests a side branch intraductal papillary mucinous neoplasm (IPMN).[facility] One testing (results 8/13/19) shows no reportable alterations w/  diagnostic claims. MS-Stable, TMB - low (5 Muts/Mb). \par \par S/p C2 Carbo retreat/Taxol/Onpro 9/05/19. WBC today 11.0 (likely secondary to OnPro), hgb 12.3 g/dL, PLT 177K\par \par Plan:\par - Proceed w/ C3 Carbo/taxol/OnPro on 9/24/19. \par - F/u w/ Dr. Carmona for (IPMN), as seen on MRI abd.\par - RTO on 10/15 and F/u w/ Dr. Price on 11/05.Follow up in 4 weeks w/ NP

## 2019-11-12 NOTE — RESULTS/DATA
[FreeTextEntry1] : 8/09/19 MRI Abdomen\par IMPRESSION: \par \par Cystic lesion in the pancreatic body corresponds to the finding on CT scan. \par The MRI appearance suggests a side branch intraductal papillary mucinous \par neoplasm (IPMN). Other cystic pancreatic lesions are in the differential \par diagnosis. \par \par Left para-aortic adenopathy, stable.

## 2019-11-12 NOTE — HISTORY OF PRESENT ILLNESS
[Disease: _____________________] : Disease: [unfilled] [M: ___] : M[unfilled] [AJCC Stage: ____] : AJCC Stage: [unfilled] [de-identified] : Ms. Haas is a 67 year old female with recently diagnosed endometrial cancer. She had an abnormal Pap smear on May 10, 2017, which showed HGSIL, positive HPV 16. On May 30, 2017, she underwent colposcopic examination. Cervical biopsies were performed with anterior cervical biopsy showing LGSIL and smaller fragments, suspicious for HGSIL; posterior cervical biopsy showing small focus of HGSIL. Endocervical curettage showed papillary serous carcinoma with psamomma body formation, and a small separate fragment of dysplastic squamous epithelium, faver high-grade squamous intraepithelial lesion (CORDELIA 2/moderate dysplasia).\par \par [id number] MRI pelvis - There is abnormal increased signal in the lower uterine segment endometrial canal also involving the upper portion of the cervical canal. This is secondary \par to a hypoenhancing mass in this region measuring 1.8 x 0.9 x 1.9 cm.The left ovary/left adnexal region is enlarged and heterogeneous in appearance measuring 4.1 x 3.6 x 2.6 cm. It is in close proximity to the sigmoid colon which demonstrates extensive diverticulosis and a focal fluid collection inseparable from the wall of the sigmoid colon adjacent to the left ovary. This focal fluid collection measures 2.3 x 1.8 cm and demonstrates prominent enhancement of the tissue surrounding it which is inseparable from enhancement adjacent to the left ovary. Patient is reportedly asymptomatic but these findings may represent subacute sigmoid diverticulitis with intramural/serosal sigmoid fluid collection/abscess which secondarily involves the left ovary. Neoplastic disease in this region is felt to be less likely.There is a 1.4 cm enhancing polypoid lesion inseparable from the cecum likely arising from the serosal surface which is suspicious for a tumor implant. Lymph nodes within pelvis are normal in size.  There is an abnormal appearing left inguinal lymph node measuring 2.4 x 2.1 cm.   \par \par 6/23/17 PET CT -  FDG avid left inguinal lymph node, 2.5 x 2.1 cm (SUV 10.6; image 210). FDG avid left para-aortic lymph node, 1.4 x 1.3 cm (SUV 10.9; image 158).\par 1.3 x 1.2 cm soft tissue nodule along the serosal surface of the cecum (SUV 13.0; image 209), as well as an additional nodule 1.2 x 1.1 cm (image 208; SUV 11.6). FDG avidity in the sigmoid colon in an area of diverticulosis SUV 12.2 (image 203).Focus of FDG avidity in the endometrial cavity (SUV 6.7; \par image 210) .\par \par s/p cytoreduction on 12/18/17.  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 \par MMR proficient.\par  [de-identified] : papillary serous carcinoma with psamomma body formation [de-identified] : Restarted chemo on 8/12/19. Denies fevers, no SOB, no CP, appetite is good, no constipation or diarrhea, no pain/burning w/ urination.No vaginal discharge or bleeding. Has no c/o of numbness/tingling in the hands or feet. No LE edema. Energy level is good. Very forgetful during office visit. No H/A's or blurred vision. The remainder of ROS is negative.

## 2019-11-12 NOTE — HISTORY OF PRESENT ILLNESS
[Disease: _____________________] : Disease: [unfilled] [M: ___] : M[unfilled] [AJCC Stage: ____] : AJCC Stage: [unfilled] [de-identified] : Ms. Haas is a 67 year old female with recently diagnosed endometrial cancer. She had an abnormal Pap smear on May 10, 2017, which showed HGSIL, positive HPV 16. On May 30, 2017, she underwent colposcopic examination. Cervical biopsies were performed with anterior cervical biopsy showing LGSIL and smaller fragments, suspicious for HGSIL; posterior cervical biopsy showing small focus of HGSIL. Endocervical curettage showed papillary serous carcinoma with psamomma body formation, and a small separate fragment of dysplastic squamous epithelium, faver high-grade squamous intraepithelial lesion (CORDELIA 2/moderate dysplasia).\par \par [id number] MRI pelvis - There is abnormal increased signal in the lower uterine segment endometrial canal also involving the upper portion of the cervical canal. This is secondary \par to a hypoenhancing mass in this region measuring 1.8 x 0.9 x 1.9 cm.The left ovary/left adnexal region is enlarged and heterogeneous in appearance measuring 4.1 x 3.6 x 2.6 cm. It is in close proximity to the sigmoid colon which demonstrates extensive diverticulosis and a focal fluid collection inseparable from the wall of the sigmoid colon adjacent to the left ovary. This focal fluid collection measures 2.3 x 1.8 cm and demonstrates prominent enhancement of the tissue surrounding it which is inseparable from enhancement adjacent to the left ovary. Patient is reportedly asymptomatic but these findings may represent subacute sigmoid diverticulitis with intramural/serosal sigmoid fluid collection/abscess which secondarily involves the left ovary. Neoplastic disease in this region is felt to be less likely.There is a 1.4 cm enhancing polypoid lesion inseparable from the cecum likely arising from the serosal surface which is suspicious for a tumor implant. Lymph nodes within pelvis are normal in size.  There is an abnormal appearing left inguinal lymph node measuring 2.4 x 2.1 cm.   \par \par 6/23/17 PET CT -  FDG avid left inguinal lymph node, 2.5 x 2.1 cm (SUV 10.6; image 210). FDG avid left para-aortic lymph node, 1.4 x 1.3 cm (SUV 10.9; image 158).\par 1.3 x 1.2 cm soft tissue nodule along the serosal surface of the cecum (SUV 13.0; image 209), as well as an additional nodule 1.2 x 1.1 cm (image 208; SUV 11.6). FDG avidity in the sigmoid colon in an area of diverticulosis SUV 12.2 (image 203).Focus of FDG avidity in the endometrial cavity (SUV 6.7; \par image 210) .\par \par s/p cytoreduction on 12/18/17.  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 \par MMR proficient.\par  [de-identified] : papillary serous carcinoma with psamomma body formation [de-identified] : Comes in today after contacting the office for mouth soreness, in which she describes as a "canker sore", she noticed this on 9/25 and yesterday had L facial swelling. S/p C3 Carbo/Taxol OnPro on 9/24/19. There have been no associated fevers. It is taking her longer to chew.

## 2019-11-12 NOTE — PHYSICAL EXAM
[Normal] : affect appropriate [Restricted in physically strenuous activity but ambulatory and able to carry out work of a light or sedentary nature] : Status 1- Restricted in physically strenuous activity but ambulatory and able to carry out work of a light or sedentary nature, e.g., light house work, office work [de-identified] : supple [de-identified] : clear bilaterally [de-identified] : S1/S2 RR [de-identified] : No edema [de-identified] : BS+, soft, nontender on palpation. [de-identified] : without spinal or cva tenderness. [de-identified] : Fogetful, knows date, season, president...

## 2019-11-20 ENCOUNTER — OUTPATIENT (OUTPATIENT)
Dept: OUTPATIENT SERVICES | Facility: HOSPITAL | Age: 69
LOS: 1 days | Discharge: HOME | End: 2019-11-20

## 2019-11-20 DIAGNOSIS — C55 MALIGNANT NEOPLASM OF UTERUS, PART UNSPECIFIED: ICD-10-CM

## 2019-11-20 DIAGNOSIS — C54.1 MALIGNANT NEOPLASM OF ENDOMETRIUM: ICD-10-CM

## 2019-11-26 ENCOUNTER — RESULT REVIEW (OUTPATIENT)
Age: 69
End: 2019-11-26

## 2019-11-26 ENCOUNTER — APPOINTMENT (OUTPATIENT)
Dept: HEMATOLOGY ONCOLOGY | Facility: CLINIC | Age: 69
End: 2019-11-26
Payer: MEDICARE

## 2019-11-26 ENCOUNTER — LABORATORY RESULT (OUTPATIENT)
Age: 69
End: 2019-11-26

## 2019-11-26 ENCOUNTER — APPOINTMENT (OUTPATIENT)
Age: 69
End: 2019-11-26

## 2019-11-26 DIAGNOSIS — Z79.899 OTHER LONG TERM (CURRENT) DRUG THERAPY: ICD-10-CM

## 2019-11-26 LAB
ANISOCYTOSIS BLD QL: SLIGHT — SIGNIFICANT CHANGE UP
BASOPHILS # BLD AUTO: 0.1 K/UL — SIGNIFICANT CHANGE UP (ref 0–0.2)
BASOPHILS NFR BLD AUTO: 0.8 % — SIGNIFICANT CHANGE UP (ref 0–2)
ELLIPTOCYTES BLD QL SMEAR: SLIGHT — SIGNIFICANT CHANGE UP
EOSINOPHIL # BLD AUTO: 0.1 K/UL — SIGNIFICANT CHANGE UP (ref 0–0.5)
EOSINOPHIL NFR BLD AUTO: 1.7 % — SIGNIFICANT CHANGE UP (ref 0–6)
HCT VFR BLD CALC: 30.4 % — LOW (ref 34.5–45)
HGB BLD-MCNC: 11.1 G/DL — LOW (ref 11.5–15.5)
LYMPHOCYTES # BLD AUTO: 0.8 K/UL — LOW (ref 1–3.3)
LYMPHOCYTES # BLD AUTO: 9 % — LOW (ref 13–44)
MACROCYTES BLD QL: SLIGHT — SIGNIFICANT CHANGE UP
MCHC RBC-ENTMCNC: 36.4 PG — HIGH (ref 27–34)
MCHC RBC-ENTMCNC: 36.6 G/DL — HIGH (ref 32–36)
MCV RBC AUTO: 99.4 FL — SIGNIFICANT CHANGE UP (ref 80–100)
METAMYELOCYTES # FLD: 1 % — HIGH (ref 0–0)
MICROCYTES BLD QL: SLIGHT — SIGNIFICANT CHANGE UP
MONOCYTES # BLD AUTO: 0.5 K/UL — SIGNIFICANT CHANGE UP (ref 0–0.9)
MONOCYTES NFR BLD AUTO: 7 % — SIGNIFICANT CHANGE UP (ref 2–14)
NEUTROPHILS # BLD AUTO: 5.6 K/UL — SIGNIFICANT CHANGE UP (ref 1.8–7.4)
NEUTROPHILS NFR BLD AUTO: 82 % — HIGH (ref 43–77)
OVALOCYTES BLD QL SMEAR: SLIGHT — SIGNIFICANT CHANGE UP
PLAT MORPH BLD: NORMAL — SIGNIFICANT CHANGE UP
PLATELET # BLD AUTO: 132 K/UL — LOW (ref 150–400)
POIKILOCYTOSIS BLD QL AUTO: SLIGHT — SIGNIFICANT CHANGE UP
POLYCHROMASIA BLD QL SMEAR: SLIGHT — SIGNIFICANT CHANGE UP
RBC # BLD: 3.05 M/UL — LOW (ref 3.8–5.2)
RBC # FLD: 17.7 % — HIGH (ref 10.3–14.5)
RBC BLD AUTO: SIGNIFICANT CHANGE UP
VARIANT LYMPHS # BLD: 1 % — SIGNIFICANT CHANGE UP (ref 0–6)
WBC # BLD: 7.2 K/UL — SIGNIFICANT CHANGE UP (ref 3.8–10.5)
WBC # FLD AUTO: 7.2 K/UL — SIGNIFICANT CHANGE UP (ref 3.8–10.5)

## 2019-11-26 PROCEDURE — 99214 OFFICE O/P EST MOD 30 MIN: CPT

## 2019-11-26 NOTE — HISTORY OF PRESENT ILLNESS
[Disease: _____________________] : Disease: [unfilled] [M: ___] : M[unfilled] [AJCC Stage: ____] : AJCC Stage: [unfilled] [de-identified] : Ms. Haas is a 67 year old female with recently diagnosed endometrial cancer. She had an abnormal Pap smear on May 10, 2017, which showed HGSIL, positive HPV 16. On May 30, 2017, she underwent colposcopic examination. Cervical biopsies were performed with anterior cervical biopsy showing LGSIL and smaller fragments, suspicious for HGSIL; posterior cervical biopsy showing small focus of HGSIL. Endocervical curettage showed papillary serous carcinoma with psamomma body formation, and a small separate fragment of dysplastic squamous epithelium, faver high-grade squamous intraepithelial lesion (CORDELIA 2/moderate dysplasia).\par \par [id number] MRI pelvis - There is abnormal increased signal in the lower uterine segment endometrial canal also involving the upper portion of the cervical canal. This is secondary \par to a hypoenhancing mass in this region measuring 1.8 x 0.9 x 1.9 cm.The left ovary/left adnexal region is enlarged and heterogeneous in appearance measuring 4.1 x 3.6 x 2.6 cm. It is in close proximity to the sigmoid colon which demonstrates extensive diverticulosis and a focal fluid collection inseparable from the wall of the sigmoid colon adjacent to the left ovary. This focal fluid collection measures 2.3 x 1.8 cm and demonstrates prominent enhancement of the tissue surrounding it which is inseparable from enhancement adjacent to the left ovary. Patient is reportedly asymptomatic but these findings may represent subacute sigmoid diverticulitis with intramural/serosal sigmoid fluid collection/abscess which secondarily involves the left ovary. Neoplastic disease in this region is felt to be less likely.There is a 1.4 cm enhancing polypoid lesion inseparable from the cecum likely arising from the serosal surface which is suspicious for a tumor implant. Lymph nodes within pelvis are normal in size.  There is an abnormal appearing left inguinal lymph node measuring 2.4 x 2.1 cm.   \par \par 6/23/17 PET CT -  FDG avid left inguinal lymph node, 2.5 x 2.1 cm (SUV 10.6; image 210). FDG avid left para-aortic lymph node, 1.4 x 1.3 cm (SUV 10.9; image 158).\par 1.3 x 1.2 cm soft tissue nodule along the serosal surface of the cecum (SUV 13.0; image 209), as well as an additional nodule 1.2 x 1.1 cm (image 208; SUV 11.6). FDG avidity in the sigmoid colon in an area of diverticulosis SUV 12.2 (image 203).Focus of FDG avidity in the endometrial cavity (SUV 6.7; \par image 210) .\par \par s/p cytoreduction on 12/18/17.  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 \par MMR proficient.\par  [de-identified] : [place] for evaluation of C 6 carbo (retreat)/Taxol. Since last seen, denies fevers, no SOB, no CP, appetite is good, no constipation or diarrhea, no pain/burning w/ urination. No LE edema. Energy level is fair to good. Denies numbness/tingling to hands or feet. Daughter calls while I am there w/ patient, she tells me she took her to a neuropsychiatrist, Dr. Adonis Ogden ([phone]), on 10/25/19 and his initial impression is that she has vascular dementia. The remainder of ROS is negative.\par   [de-identified] : papillary serous carcinoma with psamomma body formation

## 2019-11-26 NOTE — PHYSICAL EXAM
[Restricted in physically strenuous activity but ambulatory and able to carry out work of a light or sedentary nature] : Status 1- Restricted in physically strenuous activity but ambulatory and able to carry out work of a light or sedentary nature, e.g., light house work, office work [Normal] : full range of motion and no deformities appreciated [de-identified] : rhonchi R base that cleared w/ cough. [de-identified] : Interactive, well groomed, in NAD [de-identified] : S1/S2 RR [de-identified] : negative cervical, supra/infraclavicular adenopathy. [de-identified] : BS+, soft, nontender on palpation. [de-identified] : No edema [de-identified] : without spinal or cva tenderness. 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 New she is traveling to [place] for Thanksgiving....

## 2019-11-26 NOTE — ASSESSMENT
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 She received 6 cycles of carbo-taxol followed by cytoreductive surgery, followed by 2 cycles of adjuvant carbo-taxol. C9 omitted given prolonged cytopenias.  She completed vaginal cuff brachytherapy on 8/6/18.  7/16/19 PET CT which shows FDG avid left para-aortic and bilateral internal iliac lymph nodes, suspicious for recurrent/metastatic disease.  Since it has almost been 1.5 years since her last treatment, recommendation made for platinum sensitive recurrent disease with carbo AUC 5 and Taxol 160 mg/m2 every 3 weeks with Onpro support. \par Foundation One testing (results 8/13/19) shows no reportable alterations w/  diagnostic claims.\par MS-Stable, TMB - low (5 Muts/Mb). Restaging CT C/A/P 10/24/19 since prior CT scan 7/5/2019, the previously enlarged left para-aortic lymph node is now normal in size. Stable right lower lobe lung nodule.\par \par C1 Carbo/Taxol/Onpro 8/12/19\par C2 Carbo/Taxol/Onpro 9/05/19\par C3 Carbo/Taxol/Onpro 9/24/19\par C4 Carbo/Taxol/Onpro 10/15/19\par C5 Carbo/Taxol/Onpro 11/05/19\par \par S/p 5 cycles of Carbo/taxol (retreat). WBC 7.2, Hgb 11.1g/dL  (down from 12.3 on 10/15), PLT 132K. Received B12 injections on 10/15 & 11/05. MRI brain for c/o H/A 11/01/19 show no evidence of acute hemorrhage, mass effect or abnormal areas of enhancement. Heterogeneous signal involving the calvarium as described. Reviewed head MRI w/ Dr. Ochoa, no concern regarding heterogeneous signal with associated enhancement involving the calvarium, as the incidence of bony mets is rare in uterine cancer. Had consult w/ neuropsychiatrist, Dr. Adonis Ogden ([phone]), on 10/25/19 and per his daughter, his initial impression is that she has vascular dementia. Recommendations below.\par \par Plan:\par - Proceed w/ C6 Carbo/Taxol/OnPro. Then discontinue chemotherapy.\par - Anemia: hgb trended down to 11.1g/d today. Repeat B12 level today.\par - Smoking:~ 1 ppd of cigarettes, still not interested in strategies to quit.\par - Magnesium: on 11/05 1.8, await results from today.\par - F/u w/ Dr. Ochoa in 3 weeks.

## 2019-11-27 ENCOUNTER — RESULT REVIEW (OUTPATIENT)
Age: 69
End: 2019-11-27

## 2019-11-27 DIAGNOSIS — Z51.11 ENCOUNTER FOR ANTINEOPLASTIC CHEMOTHERAPY: ICD-10-CM

## 2019-11-27 DIAGNOSIS — Z51.89 ENCOUNTER FOR OTHER SPECIFIED AFTERCARE: ICD-10-CM

## 2019-11-27 DIAGNOSIS — R11.2 NAUSEA WITH VOMITING, UNSPECIFIED: ICD-10-CM

## 2019-12-17 ENCOUNTER — APPOINTMENT (OUTPATIENT)
Age: 69
End: 2019-12-17

## 2019-12-19 ENCOUNTER — OUTPATIENT (OUTPATIENT)
Dept: OUTPATIENT SERVICES | Facility: HOSPITAL | Age: 69
LOS: 1 days | Discharge: HOME | End: 2019-12-19

## 2019-12-19 DIAGNOSIS — C54.1 MALIGNANT NEOPLASM OF ENDOMETRIUM: ICD-10-CM

## 2019-12-19 DIAGNOSIS — C55 MALIGNANT NEOPLASM OF UTERUS, PART UNSPECIFIED: ICD-10-CM

## 2019-12-23 ENCOUNTER — RX CHANGE (OUTPATIENT)
Age: 69
End: 2019-12-23

## 2019-12-23 ENCOUNTER — APPOINTMENT (OUTPATIENT)
Dept: HEMATOLOGY ONCOLOGY | Facility: CLINIC | Age: 69
End: 2019-12-23
Payer: MEDICARE

## 2019-12-23 ENCOUNTER — RESULT REVIEW (OUTPATIENT)
Age: 69
End: 2019-12-23

## 2019-12-23 VITALS
OXYGEN SATURATION: 94 % | DIASTOLIC BLOOD PRESSURE: 74 MMHG | HEIGHT: 64 IN | BODY MASS INDEX: 26.63 KG/M2 | HEART RATE: 81 BPM | WEIGHT: 156.01 LBS | SYSTOLIC BLOOD PRESSURE: 123 MMHG | TEMPERATURE: 97.8 F

## 2019-12-23 LAB
BASOPHILS # BLD AUTO: 0.1 K/UL — SIGNIFICANT CHANGE UP (ref 0–0.2)
BASOPHILS NFR BLD AUTO: 0.8 % — SIGNIFICANT CHANGE UP (ref 0–2)
EOSINOPHIL # BLD AUTO: 0.2 K/UL — SIGNIFICANT CHANGE UP (ref 0–0.5)
EOSINOPHIL NFR BLD AUTO: 2.4 % — SIGNIFICANT CHANGE UP (ref 0–6)
HCT VFR BLD CALC: 31 % — LOW (ref 34.5–45)
HGB BLD-MCNC: 10.6 G/DL — LOW (ref 11.5–15.5)
LYMPHOCYTES # BLD AUTO: 1.3 K/UL — SIGNIFICANT CHANGE UP (ref 1–3.3)
LYMPHOCYTES # BLD AUTO: 19.2 % — SIGNIFICANT CHANGE UP (ref 13–44)
MCHC RBC-ENTMCNC: 34.1 G/DL — SIGNIFICANT CHANGE UP (ref 32–36)
MCHC RBC-ENTMCNC: 35.4 PG — HIGH (ref 27–34)
MCV RBC AUTO: 104 FL — HIGH (ref 80–100)
MONOCYTES # BLD AUTO: 0.5 K/UL — SIGNIFICANT CHANGE UP (ref 0–0.9)
MONOCYTES NFR BLD AUTO: 8 % — SIGNIFICANT CHANGE UP (ref 2–14)
NEUTROPHILS # BLD AUTO: 4.7 K/UL — SIGNIFICANT CHANGE UP (ref 1.8–7.4)
NEUTROPHILS NFR BLD AUTO: 69.6 % — SIGNIFICANT CHANGE UP (ref 43–77)
PLATELET # BLD AUTO: 141 K/UL — LOW (ref 150–400)
RBC # BLD: 2.98 M/UL — LOW (ref 3.8–5.2)
RBC # FLD: 17.1 % — HIGH (ref 10.3–14.5)
WBC # BLD: 6.8 K/UL — SIGNIFICANT CHANGE UP (ref 3.8–10.5)
WBC # FLD AUTO: 6.8 K/UL — SIGNIFICANT CHANGE UP (ref 3.8–10.5)

## 2019-12-23 PROCEDURE — 99214 OFFICE O/P EST MOD 30 MIN: CPT

## 2019-12-23 RX ORDER — BREXPIPRAZOLE 1 MG/1
1 TABLET ORAL
Refills: 0 | Status: ACTIVE | COMMUNITY
Start: 2019-12-23

## 2019-12-23 NOTE — PHYSICAL EXAM
[Restricted in physically strenuous activity but ambulatory and able to carry out work of a light or sedentary nature] : Status 1- Restricted in physically strenuous activity but ambulatory and able to carry out work of a light or sedentary nature, e.g., light house work, office work [Normal] : affect appropriate [de-identified] : Interactive, well groomed, in NAD [de-identified] : negative cervical, supra/infraclavicular adenopathy. [de-identified] : clear [de-identified] : S1/S2 RR [de-identified] : No edema [de-identified] : without spinal or cva tenderness. [de-identified] : BS+, soft, nontender on palpation.

## 2019-12-23 NOTE — ASSESSMENT
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 She received 6 cycles of carbo-taxol followed by cytoreductive surgery, followed by 2 cycles of adjuvant carbo-taxol. C9 omitted given prolonged cytopenias.  She completed vaginal cuff brachytherapy on 8/6/18. Developed recurrence in 7/2019 with left para-aortic and bilateral internal iliac lymph nodes, suspicious for recurrent/metastatic disease.  For platinum sensitive recurrent disease she was treated with carbo AUC 5 and Taxol 160 mg/m2 every 3 weeks with Onpro support. \par [facility] One testing (results 8/13/19) shows no reportable alterations w/  diagnostic claims.\par MS-Stable, TMB - low (5 Muts/Mb). Restaging\par \par  CT C/A/P 10/24/19 since prior CT scan 7/5/2019, the previously enlarged left para-aortic lymph node is now normal in size. Stable right lower lobe lung nodule.\par \par \par S/p 6 cycles of carbo/taxol retreat completed on 11/26/19.  \par \par Plan:\par - Repeat CT scans in 1/2020.\par -if no evidence of disease, consider Avastin maintenance vs close surveillance and start Avastin when there is evidence of disease\par -Follow up in 1 month

## 2019-12-23 NOTE — HISTORY OF PRESENT ILLNESS
[Disease: _____________________] : Disease: [unfilled] [M: ___] : M[unfilled] [AJCC Stage: ____] : AJCC Stage: [unfilled] [de-identified] : Ms. Haas is a 67 year old female with recently diagnosed endometrial cancer. She had an abnormal Pap smear on May 10, 2017, which showed HGSIL, positive HPV 16. On May 30, 2017, she underwent colposcopic examination. Cervical biopsies were performed with anterior cervical biopsy showing LGSIL and smaller fragments, suspicious for HGSIL; posterior cervical biopsy showing small focus of HGSIL. Endocervical curettage showed papillary serous carcinoma with psamomma body formation, and a small separate fragment of dysplastic squamous epithelium, faver high-grade squamous intraepithelial lesion (CORDELIA 2/moderate dysplasia).\par \par 75038 MRI pelvis - There is abnormal increased signal in the lower uterine segment endometrial canal also involving the upper portion of the cervical canal. This is secondary \par to a hypoenhancing mass in this region measuring 1.8 x 0.9 x 1.9 cm.The left ovary/left adnexal region is enlarged and heterogeneous in appearance measuring 4.1 x 3.6 x 2.6 cm. It is in close proximity to the sigmoid colon which demonstrates extensive diverticulosis and a focal fluid collection inseparable from the wall of the sigmoid colon adjacent to the left ovary. This focal fluid collection measures 2.3 x 1.8 cm and demonstrates prominent enhancement of the tissue surrounding it which is inseparable from enhancement adjacent to the left ovary. Patient is reportedly asymptomatic but these findings may represent subacute sigmoid diverticulitis with intramural/serosal sigmoid fluid collection/abscess which secondarily involves the left ovary. Neoplastic disease in this region is felt to be less likely.There is a 1.4 cm enhancing polypoid lesion inseparable from the cecum likely arising from the serosal surface which is suspicious for a tumor implant. Lymph nodes within pelvis are normal in size.  There is an abnormal appearing left inguinal lymph node measuring 2.4 x 2.1 cm.   \par \par 6/23/17 PET CT -  FDG avid left inguinal lymph node, 2.5 x 2.1 cm (SUV 10.6; image 210). FDG avid left para-aortic lymph node, 1.4 x 1.3 cm (SUV 10.9; image 158).\par 1.3 x 1.2 cm soft tissue nodule along the serosal surface of the cecum (SUV 13.0; image 209), as well as an additional nodule 1.2 x 1.1 cm (image 208; SUV 11.6). FDG avidity in the sigmoid colon in an area of diverticulosis SUV 12.2 (image 203).Focus of FDG avidity in the endometrial cavity (SUV 6.7; \par image 210) .\par \par s/p cytoreduction on 12/18/17.  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 \par MMR proficient.\par  [de-identified] : papillary serous carcinoma with psamomma body formation [de-identified] : Returns for follow up.\par S/p C6 of carbo/taxol retreat on 11/26/19.  Reports she did well.\par Continues to have difficulty with memory and is following neuropsychiatrist, Dr. Adonis Ogden ([phone]). The remainder of ROS is negative.\par

## 2019-12-24 LAB
ALBUMIN SERPL ELPH-MCNC: 4.7 G/DL
ALP BLD-CCNC: 74 U/L
ALT SERPL-CCNC: 10 U/L
ANION GAP SERPL CALC-SCNC: 16 MMOL/L
AST SERPL-CCNC: 15 U/L
BILIRUB SERPL-MCNC: 0.5 MG/DL
BUN SERPL-MCNC: 20 MG/DL
CALCIUM SERPL-MCNC: 9.8 MG/DL
CANCER AG125 SERPL-ACNC: 25 U/ML
CHLORIDE SERPL-SCNC: 104 MMOL/L
CO2 SERPL-SCNC: 23 MMOL/L
CREAT SERPL-MCNC: 0.87 MG/DL
GLUCOSE SERPL-MCNC: 102 MG/DL
POTASSIUM SERPL-SCNC: 4.1 MMOL/L
PROT SERPL-MCNC: 7.3 G/DL
SODIUM SERPL-SCNC: 143 MMOL/L
VIT B12 SERPL-MCNC: 1327 PG/ML

## 2020-01-07 ENCOUNTER — RX RENEWAL (OUTPATIENT)
Age: 70
End: 2020-01-07

## 2020-01-07 ENCOUNTER — APPOINTMENT (OUTPATIENT)
Age: 70
End: 2020-01-07

## 2020-01-12 ENCOUNTER — FORM ENCOUNTER (OUTPATIENT)
Age: 70
End: 2020-01-12

## 2020-01-13 ENCOUNTER — APPOINTMENT (OUTPATIENT)
Dept: CT IMAGING | Facility: CLINIC | Age: 70
End: 2020-01-13
Payer: MEDICARE

## 2020-01-13 ENCOUNTER — OUTPATIENT (OUTPATIENT)
Dept: OUTPATIENT SERVICES | Facility: HOSPITAL | Age: 70
LOS: 1 days | End: 2020-01-13

## 2020-01-13 DIAGNOSIS — C55 MALIGNANT NEOPLASM OF UTERUS, PART UNSPECIFIED: ICD-10-CM

## 2020-01-13 PROCEDURE — 74177 CT ABD & PELVIS W/CONTRAST: CPT | Mod: 26

## 2020-01-13 PROCEDURE — 71260 CT THORAX DX C+: CPT | Mod: 26

## 2020-01-15 ENCOUNTER — OUTPATIENT (OUTPATIENT)
Dept: OUTPATIENT SERVICES | Facility: HOSPITAL | Age: 70
LOS: 1 days | Discharge: HOME | End: 2020-01-15

## 2020-01-15 DIAGNOSIS — C55 MALIGNANT NEOPLASM OF UTERUS, PART UNSPECIFIED: ICD-10-CM

## 2020-01-15 DIAGNOSIS — C54.1 MALIGNANT NEOPLASM OF ENDOMETRIUM: ICD-10-CM

## 2020-01-21 ENCOUNTER — APPOINTMENT (OUTPATIENT)
Dept: HEMATOLOGY ONCOLOGY | Facility: CLINIC | Age: 70
End: 2020-01-21
Payer: MEDICARE

## 2020-01-21 VITALS
TEMPERATURE: 98.1 F | OXYGEN SATURATION: 93 % | SYSTOLIC BLOOD PRESSURE: 112 MMHG | BODY MASS INDEX: 27.49 KG/M2 | DIASTOLIC BLOOD PRESSURE: 73 MMHG | WEIGHT: 161.03 LBS | HEART RATE: 82 BPM | HEIGHT: 64 IN

## 2020-01-21 PROCEDURE — 99214 OFFICE O/P EST MOD 30 MIN: CPT

## 2020-01-21 RX ORDER — ONDANSETRON 8 MG/1
8 TABLET ORAL
Qty: 15 | Refills: 1 | Status: DISCONTINUED | COMMUNITY
Start: 2019-08-12 | End: 2020-01-21

## 2020-01-21 RX ORDER — PROCHLORPERAZINE MALEATE 10 MG/1
10 TABLET ORAL
Qty: 45 | Refills: 1 | Status: DISCONTINUED | COMMUNITY
Start: 2019-08-12 | End: 2020-01-21

## 2020-01-21 NOTE — HISTORY OF PRESENT ILLNESS
[Disease: _____________________] : Disease: [unfilled] [M: ___] : M[unfilled] [AJCC Stage: ____] : AJCC Stage: [unfilled] [de-identified] : Ms. Haas is a 67 year old female with recently diagnosed endometrial cancer. She had an abnormal Pap smear on May 10, 2017, which showed HGSIL, positive HPV 16. On May 30, 2017, she underwent colposcopic examination. Cervical biopsies were performed with anterior cervical biopsy showing LGSIL and smaller fragments, suspicious for HGSIL; posterior cervical biopsy showing small focus of HGSIL. Endocervical curettage showed papillary serous carcinoma with psamomma body formation, and a small separate fragment of dysplastic squamous epithelium, faver high-grade squamous intraepithelial lesion (CORDELIA 2/moderate dysplasia).\par \par [id number] MRI pelvis - There is abnormal increased signal in the lower uterine segment endometrial canal also involving the upper portion of the cervical canal. This is secondary \par to a hypoenhancing mass in this region measuring 1.8 x 0.9 x 1.9 cm.The left ovary/left adnexal region is enlarged and heterogeneous in appearance measuring 4.1 x 3.6 x 2.6 cm. It is in close proximity to the sigmoid colon which demonstrates extensive diverticulosis and a focal fluid collection inseparable from the wall of the sigmoid colon adjacent to the left ovary. This focal fluid collection measures 2.3 x 1.8 cm and demonstrates prominent enhancement of the tissue surrounding it which is inseparable from enhancement adjacent to the left ovary. Patient is reportedly asymptomatic but these findings may represent subacute sigmoid diverticulitis with intramural/serosal sigmoid fluid collection/abscess which secondarily involves the left ovary. Neoplastic disease in this region is felt to be less likely.There is a 1.4 cm enhancing polypoid lesion inseparable from the cecum likely arising from the serosal surface which is suspicious for a tumor implant. Lymph nodes within pelvis are normal in size.  There is an abnormal appearing left inguinal lymph node measuring 2.4 x 2.1 cm.   \par \par 6/23/17 PET CT -  FDG avid left inguinal lymph node, 2.5 x 2.1 cm (SUV 10.6; image 210). FDG avid left para-aortic lymph node, 1.4 x 1.3 cm (SUV 10.9; image 158).\par 1.3 x 1.2 cm soft tissue nodule along the serosal surface of the cecum (SUV 13.0; image 209), as well as an additional nodule 1.2 x 1.1 cm (image 208; SUV 11.6). FDG avidity in the sigmoid colon in an area of diverticulosis SUV 12.2 (image 203).Focus of FDG avidity in the endometrial cavity (SUV 6.7; \par image 210) .\par \par s/p cytoreduction on 12/18/17.  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 She feeling well today. \par She had a sleep test recently by her neuropsychologist. \par  \par \par 1/13/2020 CT C/A/P: Stable right lung nodule. No adenopathy in the thorax, abdomen or pelvis.

## 2020-01-21 NOTE — PHYSICAL EXAM
[Restricted in physically strenuous activity but ambulatory and able to carry out work of a light or sedentary nature] : Status 1- Restricted in physically strenuous activity but ambulatory and able to carry out work of a light or sedentary nature, e.g., light house work, office work [Normal] : affect appropriate [de-identified] : negative cervical, supra/infraclavicular adenopathy. [de-identified] : Interactive, well groomed, in NAD [de-identified] : clear [de-identified] : No edema [de-identified] : S1/S2 RR [de-identified] : BS+, soft, nontender on palpation. [de-identified] : without spinal or cva tenderness.

## 2020-01-21 NOTE — ASSESSMENT
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 \par -s/p 6 cycles of carbo-taxol followed by cytoreductive surgery, followed by 2 cycles of adjuvant carbo-taxol. C9 omitted given prolonged cytopenias.  She completed vaginal cuff brachytherapy on 8/6/18. \par -Developed recurrence in 7/2019 with left para-aortic and bilateral internal iliac lymph nodes, suspicious for recurrent/metastatic disease.  S/p 6 cycles of carbo/taxol retreat completed on 11/26/19 for platinum sensitive disease\par -[facility] One testing (results 8/13/19) shows no reportable alterations w/  diagnostic claims.\par MS-Stable, TMB - low (5 Muts/Mb). \par -Labs reviewed and CT scans from 1/13/2020 reviewed which show no evidence of disease\par -discussed close surveillance at this time with restaging scans every 3 months, and \par -potential next line of therapy can include avastin, or lenvatinib+keytruda\par -Next scans in 4/2020 with follow up 1 week later\par \par 2)Anemia and thrombocytopenia due to chemotherapy - Hgb is stable at 10 g/dL, and platelets at 140K range, continue observation\par

## 2020-03-12 ENCOUNTER — APPOINTMENT (OUTPATIENT)
Dept: RADIATION ONCOLOGY | Facility: CLINIC | Age: 70
End: 2020-03-12

## 2020-04-07 ENCOUNTER — OUTPATIENT (OUTPATIENT)
Dept: OUTPATIENT SERVICES | Facility: HOSPITAL | Age: 70
LOS: 1 days | Discharge: HOME | End: 2020-04-07

## 2020-04-07 DIAGNOSIS — C55 MALIGNANT NEOPLASM OF UTERUS, PART UNSPECIFIED: ICD-10-CM

## 2020-06-15 ENCOUNTER — OUTPATIENT (OUTPATIENT)
Dept: OUTPATIENT SERVICES | Facility: HOSPITAL | Age: 70
LOS: 1 days | Discharge: HOME | End: 2020-06-15

## 2020-06-15 DIAGNOSIS — C55 MALIGNANT NEOPLASM OF UTERUS, PART UNSPECIFIED: ICD-10-CM

## 2020-06-19 ENCOUNTER — APPOINTMENT (OUTPATIENT)
Dept: HEMATOLOGY ONCOLOGY | Facility: CLINIC | Age: 70
End: 2020-06-19
Payer: MEDICARE

## 2020-06-19 PROCEDURE — 99214 OFFICE O/P EST MOD 30 MIN: CPT | Mod: 95

## 2020-06-19 RX ORDER — MULTIVIT-MIN/IRON/FOLIC ACID/K 18-600-40
500 CAPSULE ORAL
Refills: 0 | Status: ACTIVE | COMMUNITY
Start: 2020-06-19

## 2020-06-19 RX ORDER — CLONAZEPAM 0.25 MG/1
0.25 TABLET, ORALLY DISINTEGRATING ORAL TWICE DAILY
Refills: 0 | Status: ACTIVE | COMMUNITY
Start: 2020-06-19

## 2020-06-19 RX ORDER — KRILL/OM-3/DHA/EPA/PHOSPHO/AST 1000-230MG
81 CAPSULE ORAL
Refills: 0 | Status: ACTIVE | COMMUNITY
Start: 2020-06-19

## 2020-06-19 RX ORDER — FAMOTIDINE 20 MG/1
20 TABLET, FILM COATED ORAL
Qty: 60 | Refills: 0 | Status: ACTIVE | COMMUNITY
Start: 2020-06-19

## 2020-06-19 RX ORDER — ESCITALOPRAM OXALATE 20 MG/1
20 TABLET, FILM COATED ORAL DAILY
Refills: 0 | Status: ACTIVE | COMMUNITY
Start: 2020-06-19

## 2020-06-19 RX ORDER — CHLORHEXIDINE GLUCONATE 4 %
325 (65 FE) LIQUID (ML) TOPICAL
Refills: 3 | Status: ACTIVE | COMMUNITY
Start: 2020-06-19

## 2020-06-19 RX ORDER — POTASSIUM CHLORIDE 1.5 G/1
20 POWDER, FOR SOLUTION ORAL DAILY
Refills: 0 | Status: ACTIVE | COMMUNITY
Start: 2020-06-19

## 2020-06-19 RX ORDER — BUPROPION HYDROCHLORIDE 150 MG/1
150 TABLET, FILM COATED ORAL
Refills: 0 | Status: ACTIVE | COMMUNITY
Start: 2020-06-19

## 2020-06-19 RX ORDER — CLOPIDOGREL 75 MG/1
75 TABLET, FILM COATED ORAL
Refills: 0 | Status: ACTIVE | COMMUNITY
Start: 2020-06-19

## 2020-06-19 NOTE — ASSESSMENT
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 \par -s/p 6 cycles of carbo-taxol followed by cytoreductive surgery, followed by 2 cycles of adjuvant carbo-taxol. C9 omitted given prolonged cytopenias.  She completed vaginal cuff brachytherapy on 8/6/18. \par -Developed recurrence in 7/2019 with left para-aortic and bilateral internal iliac lymph nodes, suspicious for recurrent/metastatic disease.  S/p 6 cycles of carbo/taxol retreat completed on 11/26/19 for platinum sensitive disease\par -[facility] One testing (results 8/13/19) shows no reportable alterations w/  diagnostic claims.\par MS-Stable, TMB - low (5 Muts/Mb). \par -Labs reviewed and CT scans from 1/13/2020 reviewed which show no evidence of disease\par -discussed close surveillance at this time with restaging scans every 3 months, and \par -potential next line of therapy can include avastin, or lenvatinib+keytruda\par -recovered from [facility] and now home from rehab stay\par -restaging scans now\par \par 2)Anemia and thrombocytopenia  - repeat labs now\par \par \par Follow up in 4-5 weeks to discuss CT scans and labs.

## 2020-06-19 NOTE — HISTORY OF PRESENT ILLNESS
[Disease: _____________________] : Disease: [unfilled] [AJCC Stage: ____] : AJCC Stage: [unfilled] [M: ___] : M[unfilled] [de-identified] : Ms. Haas is a 67 year old female with recently diagnosed endometrial cancer. She had an abnormal Pap smear on May 10, 2017, which showed HGSIL, positive HPV 16. On May 30, 2017, she underwent colposcopic examination. Cervical biopsies were performed with anterior cervical biopsy showing LGSIL and smaller fragments, suspicious for HGSIL; posterior cervical biopsy showing small focus of HGSIL. Endocervical curettage showed papillary serous carcinoma with psamomma body formation, and a small separate fragment of dysplastic squamous epithelium, faver high-grade squamous intraepithelial lesion (CORDELIA 2/moderate dysplasia).\par \par 05339 MRI pelvis - There is abnormal increased signal in the lower uterine segment endometrial canal also involving the upper portion of the cervical canal. This is secondary \par to a hypoenhancing mass in this region measuring 1.8 x 0.9 x 1.9 cm.The left ovary/left adnexal region is enlarged and heterogeneous in appearance measuring 4.1 x 3.6 x 2.6 cm. It is in close proximity to the sigmoid colon which demonstrates extensive diverticulosis and a focal fluid collection inseparable from the wall of the sigmoid colon adjacent to the left ovary. This focal fluid collection measures 2.3 x 1.8 cm and demonstrates prominent enhancement of the tissue surrounding it which is inseparable from enhancement adjacent to the left ovary. Patient is reportedly asymptomatic but these findings may represent subacute sigmoid diverticulitis with intramural/serosal sigmoid fluid collection/abscess which secondarily involves the left ovary. Neoplastic disease in this region is felt to be less likely.There is a 1.4 cm enhancing polypoid lesion inseparable from the cecum likely arising from the serosal surface which is suspicious for a tumor implant. Lymph nodes within pelvis are normal in size.  There is an abnormal appearing left inguinal lymph node measuring 2.4 x 2.1 cm.   \par \par 6/23/17 PET CT -  FDG avid left inguinal lymph node, 2.5 x 2.1 cm (SUV 10.6; image 210). FDG avid left para-aortic lymph node, 1.4 x 1.3 cm (SUV 10.9; image 158).\par 1.3 x 1.2 cm soft tissue nodule along the serosal surface of the cecum (SUV 13.0; image 209), as well as an additional nodule 1.2 x 1.1 cm (image 208; SUV 11.6). FDG avidity in the sigmoid colon in an area of diverticulosis SUV 12.2 (image 203).Focus of FDG avidity in the endometrial cavity (SUV 6.7; \par image 210) .\par \par s/p cytoreduction on 12/18/17.  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 \par MMR proficient.\par  [de-identified] : Verbal consent given by patient to conduct this telehealth visit via audio-video conferencing.\par \par She was admitted to [facility] on 1/31/20, admitted with pneumonia requiring intubation, and extubated on 2/11/20.\par She was in rehab from 2/18 to 5/27/20. \par No weight loss.\par No pain.\par No abdominal distention, no N/V. Appetite is good. \par Still has memory issues intermittently\par \par  [de-identified] : papillary serous carcinoma with psamomma body formation

## 2020-06-19 NOTE — PHYSICAL EXAM
[Restricted in physically strenuous activity but ambulatory and able to carry out work of a light or sedentary nature] : Status 1- Restricted in physically strenuous activity but ambulatory and able to carry out work of a light or sedentary nature, e.g., light house work, office work [de-identified] : Interactive, well groomed, in NAD

## 2020-06-27 ENCOUNTER — APPOINTMENT (OUTPATIENT)
Dept: CT IMAGING | Facility: CLINIC | Age: 70
End: 2020-06-27
Payer: MEDICARE

## 2020-06-27 ENCOUNTER — OUTPATIENT (OUTPATIENT)
Dept: OUTPATIENT SERVICES | Facility: HOSPITAL | Age: 70
LOS: 1 days | End: 2020-06-27

## 2020-06-27 DIAGNOSIS — D64.89 OTHER SPECIFIED ANEMIAS: ICD-10-CM

## 2020-06-27 DIAGNOSIS — C54.1 MALIGNANT NEOPLASM OF ENDOMETRIUM: ICD-10-CM

## 2020-06-27 PROCEDURE — 71260 CT THORAX DX C+: CPT | Mod: 26

## 2020-06-27 PROCEDURE — 74177 CT ABD & PELVIS W/CONTRAST: CPT | Mod: 26

## 2020-07-21 ENCOUNTER — OUTPATIENT (OUTPATIENT)
Dept: OUTPATIENT SERVICES | Facility: HOSPITAL | Age: 70
LOS: 1 days | Discharge: HOME | End: 2020-07-21

## 2020-07-21 DIAGNOSIS — C55 MALIGNANT NEOPLASM OF UTERUS, PART UNSPECIFIED: ICD-10-CM

## 2020-07-27 ENCOUNTER — APPOINTMENT (OUTPATIENT)
Dept: HEMATOLOGY ONCOLOGY | Facility: CLINIC | Age: 70
End: 2020-07-27
Payer: MEDICARE

## 2020-07-27 DIAGNOSIS — D69.6 THROMBOCYTOPENIA, UNSPECIFIED: ICD-10-CM

## 2020-07-27 PROCEDURE — 99214 OFFICE O/P EST MOD 30 MIN: CPT | Mod: 95

## 2020-07-27 NOTE — PHYSICAL EXAM
[Restricted in physically strenuous activity but ambulatory and able to carry out work of a light or sedentary nature] : Status 1- Restricted in physically strenuous activity but ambulatory and able to carry out work of a light or sedentary nature, e.g., light house work, office work [de-identified] : Interactive, well groomed, in NAD

## 2020-07-27 NOTE — ASSESSMENT
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 \par -s/p 6 cycles of carbo-taxol followed by cytoreductive surgery, followed by 2 cycles of adjuvant carbo-taxol. C9 omitted given prolonged cytopenias.  She completed vaginal cuff brachytherapy on 8/6/18. \par -Developed recurrence in 7/2019 with left para-aortic and bilateral internal iliac lymph nodes, suspicious for recurrent/metastatic disease.  S/p 6 cycles of carbo/taxol retreat completed on 11/26/19 for platinum sensitive disease\par -Foundation One testing (results 8/13/19) shows no reportable alterations w/  diagnostic claims.\par MS-Stable, TMB - low (5 Muts/Mb). \par -Labs reviewed and CT scans from 1/13/2020 reviewed which show no evidence of disease\par -discussed close surveillance at this time with restaging scans every 3 months, and \par -potential next line of therapy can include avastin, or lenvatinib+keytruda\par -reviewed restaging scans from 6/27/20 which is ZEUS, L2 compression fracture (no pain currently)\par -continue observation, next scans in 11/2020 with follow up after\par \par 2)Anemia and thrombocytopenia  - repeat labs done, will request from [facility]. \par \par 3)Forgetfullness / ?dementia - follows neurology, continue donepezil\par

## 2020-07-27 NOTE — HISTORY OF PRESENT ILLNESS
[M: ___] : M[unfilled] [Disease: _____________________] : Disease: [unfilled] [AJCC Stage: ____] : AJCC Stage: [unfilled] [de-identified] : papillary serous carcinoma with psamomma body formation [de-identified] : Ms. Haas is a 67 year old female with recently diagnosed endometrial cancer. She had an abnormal Pap smear on May 10, 2017, which showed HGSIL, positive HPV 16. On May 30, 2017, she underwent colposcopic examination. Cervical biopsies were performed with anterior cervical biopsy showing LGSIL and smaller fragments, suspicious for HGSIL; posterior cervical biopsy showing small focus of HGSIL. Endocervical curettage showed papillary serous carcinoma with psamomma body formation, and a small separate fragment of dysplastic squamous epithelium, faver high-grade squamous intraepithelial lesion (CORDELIA 2/moderate dysplasia).\par \par [id number] MRI pelvis - There is abnormal increased signal in the lower uterine segment endometrial canal also involving the upper portion of the cervical canal. This is secondary \par to a hypoenhancing mass in this region measuring 1.8 x 0.9 x 1.9 cm.The left ovary/left adnexal region is enlarged and heterogeneous in appearance measuring 4.1 x 3.6 x 2.6 cm. It is in close proximity to the sigmoid colon which demonstrates extensive diverticulosis and a focal fluid collection inseparable from the wall of the sigmoid colon adjacent to the left ovary. This focal fluid collection measures 2.3 x 1.8 cm and demonstrates prominent enhancement of the tissue surrounding it which is inseparable from enhancement adjacent to the left ovary. Patient is reportedly asymptomatic but these findings may represent subacute sigmoid diverticulitis with intramural/serosal sigmoid fluid collection/abscess which secondarily involves the left ovary. Neoplastic disease in this region is felt to be less likely.There is a 1.4 cm enhancing polypoid lesion inseparable from the cecum likely arising from the serosal surface which is suspicious for a tumor implant. Lymph nodes within pelvis are normal in size.  There is an abnormal appearing left inguinal lymph node measuring 2.4 x 2.1 cm.   \par \par 6/23/17 PET CT -  FDG avid left inguinal lymph node, 2.5 x 2.1 cm (SUV 10.6; image 210). FDG avid left para-aortic lymph node, 1.4 x 1.3 cm (SUV 10.9; image 158).\par 1.3 x 1.2 cm soft tissue nodule along the serosal surface of the cecum (SUV 13.0; image 209), as well as an additional nodule 1.2 x 1.1 cm (image 208; SUV 11.6). FDG avidity in the sigmoid colon in an area of diverticulosis SUV 12.2 (image 203).Focus of FDG avidity in the endometrial cavity (SUV 6.7; \par image 210) .\par \par s/p cytoreduction on 12/18/17.  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 \par MMR proficient.\par  [de-identified] : Returns for follow up.\par Daughter reports s/p fall at rehab in early 2020, and had back pain.\par She has no back pain at this time.\par No abdominal pain. \par No N/V. \par Blood work done at [facility] [place] 1 week ago. \par \par 6/27/20 CT chest/abd/pelvis:\par IMPRESSION:\par 1.  New compression fracture of the superior endplate of L2.\par 2.  Nonobstructing bilateral nephrolithiasis.\par 3.  No evidence of recurrent or metastatic disease.\par

## 2020-11-23 ENCOUNTER — OUTPATIENT (OUTPATIENT)
Dept: OUTPATIENT SERVICES | Facility: HOSPITAL | Age: 70
LOS: 1 days | Discharge: HOME | End: 2020-11-23

## 2020-11-23 DIAGNOSIS — C55 MALIGNANT NEOPLASM OF UTERUS, PART UNSPECIFIED: ICD-10-CM

## 2020-11-23 DIAGNOSIS — C54.1 MALIGNANT NEOPLASM OF ENDOMETRIUM: ICD-10-CM

## 2020-11-27 ENCOUNTER — APPOINTMENT (OUTPATIENT)
Dept: HEMATOLOGY ONCOLOGY | Facility: CLINIC | Age: 70
End: 2020-11-27
Payer: MEDICARE

## 2020-11-27 ENCOUNTER — RESULT REVIEW (OUTPATIENT)
Age: 70
End: 2020-11-27

## 2020-11-27 VITALS
DIASTOLIC BLOOD PRESSURE: 79 MMHG | WEIGHT: 163.01 LBS | HEART RATE: 88 BPM | TEMPERATURE: 96.5 F | HEIGHT: 64 IN | OXYGEN SATURATION: 97 % | SYSTOLIC BLOOD PRESSURE: 117 MMHG | BODY MASS INDEX: 27.83 KG/M2

## 2020-11-27 LAB
BASOPHILS # BLD AUTO: 0.1 K/UL — SIGNIFICANT CHANGE UP (ref 0–0.2)
BASOPHILS NFR BLD AUTO: 1.1 % — SIGNIFICANT CHANGE UP (ref 0–2)
EOSINOPHIL # BLD AUTO: 0.2 K/UL — SIGNIFICANT CHANGE UP (ref 0–0.5)
EOSINOPHIL NFR BLD AUTO: 3.1 % — SIGNIFICANT CHANGE UP (ref 0–6)
HCT VFR BLD CALC: 36.5 % — SIGNIFICANT CHANGE UP (ref 34.5–45)
HGB BLD-MCNC: 12.9 G/DL — SIGNIFICANT CHANGE UP (ref 11.5–15.5)
LYMPHOCYTES # BLD AUTO: 1.3 K/UL — SIGNIFICANT CHANGE UP (ref 1–3.3)
LYMPHOCYTES # BLD AUTO: 16.7 % — SIGNIFICANT CHANGE UP (ref 13–44)
MCHC RBC-ENTMCNC: 33.1 PG — SIGNIFICANT CHANGE UP (ref 27–34)
MCHC RBC-ENTMCNC: 35.2 G/DL — SIGNIFICANT CHANGE UP (ref 32–36)
MCV RBC AUTO: 93.9 FL — SIGNIFICANT CHANGE UP (ref 80–100)
MONOCYTES # BLD AUTO: 0.6 K/UL — SIGNIFICANT CHANGE UP (ref 0–0.9)
MONOCYTES NFR BLD AUTO: 8.1 % — SIGNIFICANT CHANGE UP (ref 2–14)
NEUTROPHILS # BLD AUTO: 5.4 K/UL — SIGNIFICANT CHANGE UP (ref 1.8–7.4)
NEUTROPHILS NFR BLD AUTO: 70.9 % — SIGNIFICANT CHANGE UP (ref 43–77)
PLATELET # BLD AUTO: 206 K/UL — SIGNIFICANT CHANGE UP (ref 150–400)
RBC # BLD: 3.89 M/UL — SIGNIFICANT CHANGE UP (ref 3.8–5.2)
RBC # FLD: 12.2 % — SIGNIFICANT CHANGE UP (ref 10.3–14.5)
WBC # BLD: 7.7 K/UL — SIGNIFICANT CHANGE UP (ref 3.8–10.5)
WBC # FLD AUTO: 7.7 K/UL — SIGNIFICANT CHANGE UP (ref 3.8–10.5)

## 2020-11-27 PROCEDURE — 99214 OFFICE O/P EST MOD 30 MIN: CPT

## 2020-11-27 RX ORDER — ATORVASTATIN CALCIUM 20 MG/1
20 TABLET, FILM COATED ORAL
Qty: 30 | Refills: 0 | Status: ACTIVE | COMMUNITY
Start: 2020-05-26

## 2020-11-27 RX ORDER — ALPRAZOLAM 0.5 MG/1
0.5 TABLET ORAL
Qty: 1 | Refills: 0 | Status: DISCONTINUED | COMMUNITY
Start: 2019-10-15 | End: 2020-11-27

## 2020-11-27 RX ORDER — IPRATROPIUM BROMIDE AND ALBUTEROL SULFATE 2.5; .5 MG/3ML; MG/3ML
0.5-2.5 (3) SOLUTION RESPIRATORY (INHALATION)
Qty: 90 | Refills: 0 | Status: ACTIVE | COMMUNITY
Start: 2020-05-28

## 2020-11-27 RX ORDER — BUPROPION HYDROCHLORIDE 150 MG/1
150 TABLET, EXTENDED RELEASE ORAL
Qty: 30 | Refills: 0 | Status: ACTIVE | COMMUNITY
Start: 2020-06-10

## 2020-11-27 RX ORDER — ROSUVASTATIN CALCIUM 10 MG/1
10 TABLET, FILM COATED ORAL
Qty: 30 | Refills: 0 | Status: DISCONTINUED | COMMUNITY
Start: 2017-02-23 | End: 2020-11-27

## 2020-11-27 RX ORDER — ERGOCALCIFEROL 1.25 MG/1
1.25 MG CAPSULE, LIQUID FILLED ORAL
Qty: 3 | Refills: 0 | Status: ACTIVE | COMMUNITY
Start: 2020-05-29

## 2020-11-27 RX ORDER — DIAZEPAM 10 MG/1
10 TABLET ORAL
Refills: 0 | Status: DISCONTINUED | COMMUNITY
End: 2020-11-27

## 2020-11-27 NOTE — PHYSICAL EXAM
[Restricted in physically strenuous activity but ambulatory and able to carry out work of a light or sedentary nature] : Status 1- Restricted in physically strenuous activity but ambulatory and able to carry out work of a light or sedentary nature, e.g., light house work, office work [Normal] : PERRL, EOMI, no conjunctival infection, anicteric [de-identified] : Interactive, well groomed, in NAD [de-identified] : supple [de-identified] : CTA b/l [de-identified] : S1/S2 [de-identified] : No edema [de-identified] : soft, NT, ND

## 2020-11-27 NOTE — ASSESSMENT
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 \par -s/p 6 cycles of carbo-taxol followed by cytoreductive surgery, followed by 2 cycles of adjuvant carbo-taxol. C9 omitted given prolonged cytopenias.  She completed vaginal cuff brachytherapy on 8/6/18. \par -Developed recurrence in 7/2019 with left para-aortic and bilateral internal iliac lymph nodes, suspicious for recurrent/metastatic disease.  S/p 6 cycles of carbo/taxol retreat completed on 11/26/19 for platinum sensitive disease\par -Foundation One testing (results 8/13/19) shows no reportable alterations w/  diagnostic claims.\par MS-Stable, TMB - low (5 Muts/Mb). \par -discussed close surveillance at this time with restaging scans every 3 months, and \par -potential next line of therapy can include avastin, or lenvatinib+keytruda\par -reviewed restaging scans from 6/27/20 which is ZEUS, L2 compression fracture (no pain currently)\par -she did not have restaging scans as ordered, advised to get done now\par -next restaging in 4/2021\par \par 2)Anemia and thrombocytopenia  - resolved. \par \par 3)Forgetfullness / ?dementia - follows neurology, continue donepezil\par

## 2020-12-04 LAB
ALBUMIN SERPL ELPH-MCNC: 4.9 G/DL
ALP BLD-CCNC: 85 U/L
ALT SERPL-CCNC: 16 U/L
ANION GAP SERPL CALC-SCNC: 13 MMOL/L
AST SERPL-CCNC: 17 U/L
BILIRUB SERPL-MCNC: 1 MG/DL
BUN SERPL-MCNC: 28 MG/DL
CALCIUM SERPL-MCNC: 9.7 MG/DL
CANCER AG125 SERPL-ACNC: 62 U/ML
CHLORIDE SERPL-SCNC: 102 MMOL/L
CO2 SERPL-SCNC: 27 MMOL/L
CREAT SERPL-MCNC: 1.09 MG/DL
POTASSIUM SERPL-SCNC: 3.7 MMOL/L
PROT SERPL-MCNC: 7.2 G/DL
SODIUM SERPL-SCNC: 142 MMOL/L

## 2020-12-08 ENCOUNTER — RESULT REVIEW (OUTPATIENT)
Age: 70
End: 2020-12-08

## 2020-12-08 ENCOUNTER — OUTPATIENT (OUTPATIENT)
Dept: OUTPATIENT SERVICES | Facility: HOSPITAL | Age: 70
LOS: 1 days | End: 2020-12-08
Payer: MEDICARE

## 2020-12-08 ENCOUNTER — APPOINTMENT (OUTPATIENT)
Dept: CT IMAGING | Facility: CLINIC | Age: 70
End: 2020-12-08
Payer: MEDICARE

## 2020-12-08 DIAGNOSIS — C54.1 MALIGNANT NEOPLASM OF ENDOMETRIUM: ICD-10-CM

## 2020-12-08 PROCEDURE — 71260 CT THORAX DX C+: CPT

## 2020-12-08 PROCEDURE — 71260 CT THORAX DX C+: CPT | Mod: 26

## 2020-12-08 PROCEDURE — 74177 CT ABD & PELVIS W/CONTRAST: CPT

## 2020-12-08 PROCEDURE — 74177 CT ABD & PELVIS W/CONTRAST: CPT | Mod: 26

## 2020-12-11 ENCOUNTER — APPOINTMENT (OUTPATIENT)
Dept: HEMATOLOGY ONCOLOGY | Facility: CLINIC | Age: 70
End: 2020-12-11
Payer: MEDICARE

## 2020-12-11 VITALS
BODY MASS INDEX: 27.49 KG/M2 | WEIGHT: 161 LBS | DIASTOLIC BLOOD PRESSURE: 66 MMHG | HEIGHT: 64 IN | SYSTOLIC BLOOD PRESSURE: 126 MMHG | OXYGEN SATURATION: 96 % | HEART RATE: 96 BPM

## 2020-12-11 PROCEDURE — 99214 OFFICE O/P EST MOD 30 MIN: CPT

## 2020-12-16 PROBLEM — Z12.11 ENCOUNTER FOR SCREENING COLONOSCOPY: Status: RESOLVED | Noted: 2018-04-27 | Resolved: 2020-12-16

## 2020-12-16 NOTE — PHYSICAL EXAM
[Restricted in physically strenuous activity but ambulatory and able to carry out work of a light or sedentary nature] : Status 1- Restricted in physically strenuous activity but ambulatory and able to carry out work of a light or sedentary nature, e.g., light house work, office work [Normal] : PERRL, EOMI, no conjunctival infection, anicteric [de-identified] : Interactive, well groomed, in NAD [de-identified] : supple [de-identified] : CTA b/l [de-identified] : S1/S2 [de-identified] : No edema [de-identified] : soft, NT, ND

## 2020-12-16 NOTE — HISTORY OF PRESENT ILLNESS
[Disease: _____________________] : Disease: [unfilled] [M: ___] : M[unfilled] [AJCC Stage: ____] : AJCC Stage: [unfilled] [de-identified] : Ms. Haas is a 67 year old female with recently diagnosed endometrial cancer. She had an abnormal Pap smear on May 10, 2017, which showed HGSIL, positive HPV 16. On May 30, 2017, she underwent colposcopic examination. Cervical biopsies were performed with anterior cervical biopsy showing LGSIL and smaller fragments, suspicious for HGSIL; posterior cervical biopsy showing small focus of HGSIL. 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 This is secondary \par to a hypoenhancing mass in this region measuring 1.8 x 0.9 x 1.9 cm.The left ovary/left adnexal region is enlarged and heterogeneous in appearance measuring 4.1 x 3.6 x 2.6 cm. It is in close proximity to the sigmoid colon which demonstrates extensive diverticulosis and a focal fluid collection inseparable from the wall of the sigmoid colon adjacent to the left ovary. This focal fluid collection measures 2.3 x 1.8 cm and demonstrates prominent enhancement of the tissue surrounding it which is inseparable from enhancement adjacent to the left ovary. Patient is reportedly asymptomatic but these findings may represent subacute sigmoid diverticulitis with intramural/serosal sigmoid fluid collection/abscess which secondarily involves the left ovary. Neoplastic disease in this region is felt to be less likely.There is a 1.4 cm enhancing polypoid lesion inseparable from the cecum likely arising from the serosal surface which is suspicious for a tumor implant. Lymph nodes within pelvis are normal in size.  There is an abnormal appearing left inguinal lymph node measuring 2.4 x 2.1 cm.   \par \par 6/23/17 PET CT -  FDG avid left inguinal lymph node, 2.5 x 2.1 cm (SUV 10.6; image 210). FDG avid left para-aortic lymph node, 1.4 x 1.3 cm (SUV 10.9; image 158).\par 1.3 x 1.2 cm soft tissue nodule along the serosal surface of the cecum (SUV 13.0; image 209), as well as an additional nodule 1.2 x 1.1 cm (image 208; SUV 11.6). FDG avidity in the sigmoid colon in an area of diverticulosis SUV 12.2 (image 203).Focus of FDG avidity in the endometrial cavity (SUV 6.7; \par image 210) .\par \par s/p cytoreduction on 12/18/17.  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 \par No issues with walking. \par No numbness or tingling in hands or feet. \par Neuro-psychologist -  Adonis Ogden ([phone]) - vascular dementia\par Per daughter Mira, she has issues with short term memory, she is able to do some ADLs. \par \par 12/8/20\par CT c/a/p - RETROPERITONEUM/LYMPH NODES: Left para-aortic lymph node (3, 180) measures 1.9 x 1.3 cm, previously subcentimeter on 1/13/2020. Interval enlargement of common iliac lymph nodes measuring 1.3 x 0.9 cm on the right (3, 207) and 1.4 x 0.8 cm on the left (3, 208).\par

## 2020-12-21 ENCOUNTER — APPOINTMENT (OUTPATIENT)
Dept: NUCLEAR MEDICINE | Facility: CLINIC | Age: 70
End: 2020-12-21
Payer: MEDICARE

## 2020-12-21 ENCOUNTER — APPOINTMENT (OUTPATIENT)
Dept: NUCLEAR MEDICINE | Facility: CLINIC | Age: 70
End: 2020-12-21

## 2020-12-21 PROCEDURE — G1004: CPT

## 2020-12-21 PROCEDURE — 78815 PET IMAGE W/CT SKULL-THIGH: CPT | Mod: PS,KX,MG

## 2020-12-21 PROCEDURE — A9552B: CUSTOM

## 2021-01-06 ENCOUNTER — APPOINTMENT (OUTPATIENT)
Dept: RADIATION ONCOLOGY | Facility: CLINIC | Age: 71
End: 2021-01-06
Payer: MEDICARE

## 2021-01-06 PROCEDURE — 99442: CPT | Mod: 95

## 2021-01-06 NOTE — HISTORY OF PRESENT ILLNESS
[Home] : at home, [unfilled] , at the time of the visit. [Medical Office: ([facility])___] : at the medical office located in  [Verbal consent obtained from patient] : the patient, [unfilled] [FreeTextEntry3] : Mira ( daughter) [FreeTextEntry1] : This 70 year-old female is conferencing today for follow-up.  She is s/p radiation therapy (2,100 cG) to the vaginal cuff for [id number], stage lVb adenocarcinoma of the endometrium, completed 8/6/18. \par \par At last visit, patient denied vaginal discharge, edema, change in bowel habits, change in urination, pain, fatigue, or weight loss.  Follows with Dr Ochoa and is s/p  6 cycles of carbo/taxol followed by cytoreductive surgery, which was followed by 2 cycles of adjuvant carbo/taxol.  Reported she was not sexually active.\par \par 12/8/2020 CT c/a/p:\par IMPRESSION:\par Interval increase in size of retroperitoneal lymph nodes, suspicious for metastatic disease.\par \par Essentially stable subcentimeter pulmonary nodule.\par \par

## 2021-01-06 NOTE — ASSESSMENT
[Regional recurrence] : Regional recurrence [Metastatic disease without local control] : Metastatic disease without local control [FreeTextEntry1] : no appreciable treatment related sequelae.

## 2021-01-06 NOTE — LETTER CLOSING
[Sincerely yours,] : Sincerely yours, [FreeTextEntry3] : Raleigh Freire MD\par Physician in Chief\par Department of Radiation Medicine\par [facility] [place]\par [facility] [place]\par \par  of Radiation Medicine\par [facility]\par at  [facility]\par \par Radiation \par [facility]/\par [facility] [place]\par [address]\par [place], [place] [address]\par \par Tel: [phone]\par Fax: [phone]\par

## 2021-01-06 NOTE — DISEASE MANAGEMENT
[Pathological] : TNM Stage: p [IVB] : IVB [FreeTextEntry4] : adenocarcinoma of endometrium [TTNM] : 3a [NTNM] : 1 [MTNM] : 1 [de-identified] : 2100 cGy [de-identified] : vaginal cuff

## 2021-01-06 NOTE — PHYSICAL EXAM
[Normal] : oriented to person, place and time, the affect was normal, the mood was normal and not anxious [FreeTextEntry1] : deferred [de-identified] : deferred

## 2021-01-06 NOTE — REVIEW OF SYSTEMS
[Negative] : Allergic/Immunologic [Edema Limbs: Grade 0] : Edema Limbs: Grade 0  [Fatigue: Grade 0] : Fatigue: Grade 0 [Localized Edema: Grade 0] : Localized Edema: Grade 0  [Neck Edema: Grade 0] : Neck Edema: Grade 0 [Urinary Incontinence: Grade 0] : Urinary Incontinence: Grade 0  [Genital Edema: Grade 0] : Genital Edema: Grade 0 [Vaginal Stricture: Grade 0] : Vaginal Stricture: Grade 0 [Vaginal Infection: Grade 0] : Vaginal Infection: Grade 0  [Confused] : confusion [de-identified] : memory deficit progressive

## 2021-02-26 ENCOUNTER — APPOINTMENT (OUTPATIENT)
Dept: NUCLEAR MEDICINE | Facility: CLINIC | Age: 71
End: 2021-02-26
Payer: MEDICARE

## 2021-02-26 ENCOUNTER — RESULT REVIEW (OUTPATIENT)
Age: 71
End: 2021-02-26

## 2021-02-26 PROCEDURE — G1004: CPT

## 2021-02-26 PROCEDURE — 78816 PET IMAGE W/CT FULL BODY: CPT | Mod: PS,ME

## 2021-02-26 PROCEDURE — A9552B: CUSTOM

## 2021-03-09 ENCOUNTER — APPOINTMENT (OUTPATIENT)
Dept: RADIATION ONCOLOGY | Facility: CLINIC | Age: 71
End: 2021-03-09
Payer: MEDICARE

## 2021-03-09 DIAGNOSIS — Z92.3 PERSONAL HISTORY OF IRRADIATION: ICD-10-CM

## 2021-03-09 PROCEDURE — 99212 OFFICE O/P EST SF 10 MIN: CPT | Mod: 95

## 2021-03-10 PROBLEM — Z92.3 PERSONAL HISTORY OF RADIATION THERAPY: Status: ACTIVE | Noted: 2018-09-05

## 2021-03-10 NOTE — REVIEW OF SYSTEMS
[Confused] : confusion [Negative] : Allergic/Immunologic [Edema Limbs: Grade 0] : Edema Limbs: Grade 0  [Fatigue: Grade 0] : Fatigue: Grade 0 [Localized Edema: Grade 0] : Localized Edema: Grade 0  [Neck Edema: Grade 0] : Neck Edema: Grade 0 [Urinary Incontinence: Grade 0] : Urinary Incontinence: Grade 0  [Genital Edema: Grade 0] : Genital Edema: Grade 0 [Vaginal Stricture: Grade 0] : Vaginal Stricture: Grade 0 [Vaginal Infection: Grade 0] : Vaginal Infection: Grade 0  [de-identified] : memory deficit progressive

## 2021-03-10 NOTE — LETTER CLOSING
[Sincerely yours,] : Sincerely yours, [FreeTextEntry3] : Raleigh Freire MD\par Physician in Chief\par Department of Radiation Medicine\par [facility] [place]\par [facility] [place]\par \par  of Radiation Medicine\par [facility]\par at  [facility]\par \par Radiation \par [facility]par [facility] [place]\par [address]\par [place], [place] [address]\par \par Tel: [phone]\par Fax: [phone]\par

## 2021-03-10 NOTE — HISTORY OF PRESENT ILLNESS
[Home] : at home, [unfilled] , at the time of the visit. [Medical Office: ([facility])___] : at the medical office located in  [Family Member] : family member [FreeTextEntry3] : son [FreeTextEntry1] : \par \par This 71 year-old female is conferencing via her son dueto her advanced vascular dementia for re-evaluation.  She is s/p radiation therapy (2,100 cG) to the vaginal cuff for [id number], stage lVb adenocarcinoma of the endometrium, completed 8/6/18.  Ms. Haas was advised to follow up after axial imaging which was recently accomplished with a PET scan on Feb 26,2021\par \par At last visit, patient denied vaginal discharge, edema, change in bowel habits, change in urination, pain, fatigue, or weight loss.  Follows with Dr Ochoa and is s/p  6 cycles of carbo/taxol followed by cytoreductive surgery, which was followed by 2 cycles of adjuvant carbo/taxol.  Reported she was not sexually active.\par \par Todays visit was with her son only wno states that her demential has progressed although she is still able to live at home and can care for herself with the help of her daughter at times. No new respiratory symptoms. No longer on chemo due to it was exaserbating her dementia. She is strictly in a monitoring mode.\par \par \par PET/CT 2/26/2021:\par IMPRESSION:\par \par Since PET/CT December 21, 2020:\par \par 1.  Slightly increased FDG avidity of left para-aortic lymph node. Bilateral common iliac lymph nodes unchanged in size, with new minimal FDG avidity in the left and unchanged or slightly increased FDG avidity on the right.\par \par 2.  0.5 cm right middle lobe pulmonary nodule which is new since December 21, 2020, too small to characterize. Tree-in-bud opacities right upper lobe are probably infectious/inflammatory. 0.7 cm right lower lobe pulmonary nodule is unchanged. Consider follow-up with CT chest.\par \par ABDOMINOPELVIC NODES: Increased FDG avidity of left para-aortic lymph node 1.9 x 1.2 cm (SUV 6.9; image 180), previously 1.9 x 1.1 cm with SUV 4.0. Bilateral common iliac lymph nodes are unchanged for reference on the right 1.2 cm (SUV 3.4; image 196), previously 1.3 cm with SUV 2.5. Left common iliac lymph node is minimally avid 0.9 cm with SUV 3.4; image 195), unchanged in size and previously non-FDG avid.\par \par 12/8/2020 CT c/a/p:\par IMPRESSION:\par Interval increase in size of retroperitoneal lymph nodes, suspicious for metastatic disease.\par \par Essentially stable subcentimeter pulmonary nodule.\par \par

## 2021-03-10 NOTE — ASSESSMENT
[Regional recurrence] : Regional recurrence [Metastatic disease without local control] : Metastatic disease without local control [FreeTextEntry1] : no appreciable treatment related sequelae. Worsening of her dementia felt to be of vascular origin likely brought on by smoking and exaserbated by chemotherapy.

## 2021-03-10 NOTE — REASON FOR VISIT
[Endometrial Cancer] : endometrial cancer [Routine Follow-Up] : routine follow-up visit for [Other: ___] : [unfilled] [Family Member] : family member [Other: _____] : [unfilled]

## 2021-03-10 NOTE — DISEASE MANAGEMENT
[Pathological] : TNM Stage: p [IVB] : IVB [FreeTextEntry4] : adenocarcinoma of endometrium [TTNM] : 3a [NTNM] : 1 [MTNM] : 1 [de-identified] : 2100 cGy [de-identified] : vaginal cuff

## 2021-05-06 ENCOUNTER — RESULT REVIEW (OUTPATIENT)
Age: 71
End: 2021-05-06

## 2021-05-06 ENCOUNTER — APPOINTMENT (OUTPATIENT)
Dept: CT IMAGING | Facility: CLINIC | Age: 71
End: 2021-05-06
Payer: MEDICARE

## 2021-05-06 PROCEDURE — 82565A: CUSTOM | Mod: QW

## 2021-05-06 PROCEDURE — 71260 CT THORAX DX C+: CPT | Mod: MH

## 2021-05-06 PROCEDURE — 74177 CT ABD & PELVIS W/CONTRAST: CPT | Mod: MH

## 2021-05-19 ENCOUNTER — OUTPATIENT (OUTPATIENT)
Dept: OUTPATIENT SERVICES | Facility: HOSPITAL | Age: 71
LOS: 1 days | Discharge: HOME | End: 2021-05-19

## 2021-05-19 DIAGNOSIS — C55 MALIGNANT NEOPLASM OF UTERUS, PART UNSPECIFIED: ICD-10-CM

## 2021-05-21 ENCOUNTER — APPOINTMENT (OUTPATIENT)
Dept: HEMATOLOGY ONCOLOGY | Facility: CLINIC | Age: 71
End: 2021-05-21
Payer: MEDICARE

## 2021-05-21 ENCOUNTER — RESULT REVIEW (OUTPATIENT)
Age: 71
End: 2021-05-21

## 2021-05-21 VITALS
DIASTOLIC BLOOD PRESSURE: 70 MMHG | BODY MASS INDEX: 27.49 KG/M2 | SYSTOLIC BLOOD PRESSURE: 104 MMHG | OXYGEN SATURATION: 94 % | HEART RATE: 93 BPM | WEIGHT: 161.02 LBS | HEIGHT: 64 IN

## 2021-05-21 DIAGNOSIS — Z85.42 ENCOUNTER FOR FOLLOW-UP EXAMINATION AFTER COMPLETED TREATMENT FOR MALIGNANT NEOPLASM: ICD-10-CM

## 2021-05-21 DIAGNOSIS — Z08 ENCOUNTER FOR FOLLOW-UP EXAMINATION AFTER COMPLETED TREATMENT FOR MALIGNANT NEOPLASM: ICD-10-CM

## 2021-05-21 DIAGNOSIS — R59.1 GENERALIZED ENLARGED LYMPH NODES: ICD-10-CM

## 2021-05-21 LAB
BASOPHILS # BLD AUTO: 0.1 K/UL — SIGNIFICANT CHANGE UP (ref 0–0.2)
BASOPHILS NFR BLD AUTO: 1 % — SIGNIFICANT CHANGE UP (ref 0–2)
EOSINOPHIL # BLD AUTO: 0.2 K/UL — SIGNIFICANT CHANGE UP (ref 0–0.5)
EOSINOPHIL NFR BLD AUTO: 2.9 % — SIGNIFICANT CHANGE UP (ref 0–6)
HCT VFR BLD CALC: 39 % — SIGNIFICANT CHANGE UP (ref 34.5–45)
HGB BLD-MCNC: 13.5 G/DL — SIGNIFICANT CHANGE UP (ref 11.5–15.5)
LYMPHOCYTES # BLD AUTO: 1.1 K/UL — SIGNIFICANT CHANGE UP (ref 1–3.3)
LYMPHOCYTES # BLD AUTO: 13.6 % — SIGNIFICANT CHANGE UP (ref 13–44)
MCHC RBC-ENTMCNC: 33 PG — SIGNIFICANT CHANGE UP (ref 27–34)
MCHC RBC-ENTMCNC: 34.5 G/DL — SIGNIFICANT CHANGE UP (ref 32–36)
MCV RBC AUTO: 95.6 FL — SIGNIFICANT CHANGE UP (ref 80–100)
MONOCYTES # BLD AUTO: 0.5 K/UL — SIGNIFICANT CHANGE UP (ref 0–0.9)
MONOCYTES NFR BLD AUTO: 6.2 % — SIGNIFICANT CHANGE UP (ref 2–14)
NEUTROPHILS # BLD AUTO: 6.2 K/UL — SIGNIFICANT CHANGE UP (ref 1.8–7.4)
NEUTROPHILS NFR BLD AUTO: 76.3 % — SIGNIFICANT CHANGE UP (ref 43–77)
PLATELET # BLD AUTO: 252 K/UL — SIGNIFICANT CHANGE UP (ref 150–400)
RBC # BLD: 4.08 M/UL — SIGNIFICANT CHANGE UP (ref 3.8–5.2)
RBC # FLD: 13.1 % — SIGNIFICANT CHANGE UP (ref 10.3–14.5)
WBC # BLD: 8.2 K/UL — SIGNIFICANT CHANGE UP (ref 3.8–10.5)
WBC # FLD AUTO: 8.2 K/UL — SIGNIFICANT CHANGE UP (ref 3.8–10.5)

## 2021-05-21 PROCEDURE — 99214 OFFICE O/P EST MOD 30 MIN: CPT

## 2021-05-21 NOTE — HISTORY OF PRESENT ILLNESS
[Disease: _____________________] : Disease: [unfilled] [M: ___] : M[unfilled] [AJCC Stage: ____] : AJCC Stage: [unfilled] [de-identified] : Ms. Haas is a 67 year old female with recently diagnosed endometrial cancer. She had an abnormal Pap smear on May 10, 2017, which showed HGSIL, positive HPV 16. On May 30, 2017, she underwent colposcopic examination. Cervical biopsies were performed with anterior cervical biopsy showing LGSIL and smaller fragments, suspicious for HGSIL; posterior cervical biopsy showing small focus of HGSIL. 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 This is secondary \par to a hypoenhancing mass in this region measuring 1.8 x 0.9 x 1.9 cm.The left ovary/left adnexal region is enlarged and heterogeneous in appearance measuring 4.1 x 3.6 x 2.6 cm. It is in close proximity to the sigmoid colon which demonstrates extensive diverticulosis and a focal fluid collection inseparable from the wall of the sigmoid colon adjacent to the left ovary. This focal fluid collection measures 2.3 x 1.8 cm and demonstrates prominent enhancement of the tissue surrounding it which is inseparable from enhancement adjacent to the left ovary. Patient is reportedly asymptomatic but these findings may represent subacute sigmoid diverticulitis with intramural/serosal sigmoid fluid collection/abscess which secondarily involves the left ovary. Neoplastic disease in this region is felt to be less likely.There is a 1.4 cm enhancing polypoid lesion inseparable from the cecum likely arising from the serosal surface which is suspicious for a tumor implant. Lymph nodes within pelvis are normal in size.  There is an abnormal appearing left inguinal lymph node measuring 2.4 x 2.1 cm.   \par \par 6/23/17 PET CT -  FDG avid left inguinal lymph node, 2.5 x 2.1 cm (SUV 10.6; image 210). FDG avid left para-aortic lymph node, 1.4 x 1.3 cm (SUV 10.9; image 158).\par 1.3 x 1.2 cm soft tissue nodule along the serosal surface of the cecum (SUV 13.0; image 209), as well as an additional nodule 1.2 x 1.1 cm (image 208; SUV 11.6). FDG avidity in the sigmoid colon in an area of diverticulosis SUV 12.2 (image 203).Focus of FDG avidity in the endometrial cavity (SUV 6.7; \par image 210) .\par \par s/p cytoreduction on 12/18/17.  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 \par Denies N/V/D/C\par Denies pain\par No issues with walking. \par Able to feed self, dress self, bathe self with prompt from daughter\par \par 05/06/2021 CT CHEST IC & CT ABDOMEN AND PELVIS OC IC\par Findings:\par RETROPERITONEUM/LYMPH NODES: Left para-aortic lymph nodes (largest-1.9 x 1.5 cm, 2/97), not significantly changed from 12/8/2020. A subcentimeter LEFT paraceliac lymph node is equivocally enlarged (2/91). LEFT parailiac lymph node (6.5 mm max short axis, 2/108, coronal 601-50)), equivocally enlarged.\par IMPRESSION:\par 1.  Lower retroperitoneal /LEFT iliac lymph nodes, equivocally progressed from 12/8/2020.\par 2.  Stable RLL pulmonary nodule/density.\par 3.  Interval mild L3 superior endplate compression deformity\par \par 02/26/2021 PET/CT IMPRESSION:\par Since PET/CT December 21, 2020:\par 1.  Slightly increased FDG avidity of left para-aortic lymph node. Bilateral common iliac lymph nodes unchanged in size, with new minimal FDG avidity in the left and unchanged or slightly increased FDG avidity on the right.\par \par 2.  0.5 cm right middle lobe pulmonary nodule which is new since December 21, 2020, too small to characterize. Tree-in-bud opacities right upper lobe are probably infectious/inflammatory. 0.7 cm right lower lobe pulmonary nodule is unchanged. Consider follow-up with CT chest.\par \par \par

## 2021-05-21 NOTE — PHYSICAL EXAM
[Restricted in physically strenuous activity but ambulatory and able to carry out work of a light or sedentary nature] : Status 1- Restricted in physically strenuous activity but ambulatory and able to carry out work of a light or sedentary nature, e.g., light house work, office work [de-identified] : Interactive, well groomed, in NAD [de-identified] : supple [de-identified] : CTA b/l [de-identified] : S1/S2 [de-identified] : No edema [de-identified] : soft, NT, ND

## 2021-05-21 NOTE — ASSESSMENT
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 \par -s/p 6 cycles of carbo-taxol followed by cytoreductive surgery, followed by 2 cycles of adjuvant carbo-taxol. C9 omitted given prolonged cytopenias.  She completed vaginal cuff brachytherapy on 8/6/18. \par -Developed recurrence in 7/2019 with left para-aortic and bilateral internal iliac lymph nodes, suspicious for recurrent/metastatic disease.  S/p 6 cycles of carbo/taxol retreat completed on 11/26/19 for platinum sensitive disease\par -[employer] testing (results 8/13/19) shows no reportable alterations w/  diagnostic claims.\par MS-Stable, TMB - low (5 Muts/Mb). \par -potential next line of therapy can include Avastin, or lenvatinib+keytruda\par -reviewed restaging scans from 5/6/21which show POD --> A subcentimeter LEFT paraceliac lymph node is equivocally enlarged (2/91). LEFT parailiac lymph node (6.5 mm max short axis, 2/108, coronal 601-50), equivocally enlarged.\par -reviewed results with patient and daughter.  At this time, her QOL is limited by dementia.  Discussed systemic therapy and possible side effects, but would not improve her current QOL and it's unclear how much insight patient has about her disease.\par - Daughter in agreement and does not wish for patient to pursue treatment at this time and maintain QOL for patient\par -Labs: CBC w dif reviewed, CMP, Ca 125 ordered, pending results\par CBC w dif reviewed from today WBC 8.2 K HGB 13.5 g HCT 39.0 %  K \par \par 2)Vascular dementia - continue follow up with psychologist -  Adonis Ogdne ([phone]): continue donepezil\par

## 2021-06-23 ENCOUNTER — APPOINTMENT (OUTPATIENT)
Dept: RADIATION ONCOLOGY | Facility: CLINIC | Age: 71
End: 2021-06-23

## 2021-09-30 ENCOUNTER — OUTPATIENT (OUTPATIENT)
Dept: OUTPATIENT SERVICES | Facility: HOSPITAL | Age: 71
LOS: 1 days | Discharge: HOME | End: 2021-09-30

## 2021-09-30 DIAGNOSIS — C55 MALIGNANT NEOPLASM OF UTERUS, PART UNSPECIFIED: ICD-10-CM

## 2021-09-30 DIAGNOSIS — C54.1 MALIGNANT NEOPLASM OF ENDOMETRIUM: ICD-10-CM

## 2021-10-05 ENCOUNTER — APPOINTMENT (OUTPATIENT)
Dept: HEMATOLOGY ONCOLOGY | Facility: CLINIC | Age: 71
End: 2021-10-05
Payer: MEDICARE

## 2021-10-05 ENCOUNTER — RESULT REVIEW (OUTPATIENT)
Age: 71
End: 2021-10-05

## 2021-10-05 VITALS
HEART RATE: 105 BPM | HEIGHT: 64 IN | OXYGEN SATURATION: 92 % | WEIGHT: 169 LBS | SYSTOLIC BLOOD PRESSURE: 113 MMHG | DIASTOLIC BLOOD PRESSURE: 75 MMHG | BODY MASS INDEX: 28.85 KG/M2

## 2021-10-05 LAB
BASOPHILS # BLD AUTO: 0.1 K/UL — SIGNIFICANT CHANGE UP (ref 0–0.2)
BASOPHILS NFR BLD AUTO: 0.9 % — SIGNIFICANT CHANGE UP (ref 0–2)
EOSINOPHIL # BLD AUTO: 0.3 K/UL — SIGNIFICANT CHANGE UP (ref 0–0.5)
EOSINOPHIL NFR BLD AUTO: 3.4 % — SIGNIFICANT CHANGE UP (ref 0–6)
HCT VFR BLD CALC: 33.7 % — LOW (ref 34.5–45)
HGB BLD-MCNC: 11.8 G/DL — SIGNIFICANT CHANGE UP (ref 11.5–15.5)
LYMPHOCYTES # BLD AUTO: 1 K/UL — SIGNIFICANT CHANGE UP (ref 1–3.3)
LYMPHOCYTES # BLD AUTO: 12.2 % — LOW (ref 13–44)
MCHC RBC-ENTMCNC: 32.6 PG — SIGNIFICANT CHANGE UP (ref 27–34)
MCHC RBC-ENTMCNC: 34.9 G/DL — SIGNIFICANT CHANGE UP (ref 32–36)
MCV RBC AUTO: 93.5 FL — SIGNIFICANT CHANGE UP (ref 80–100)
MONOCYTES # BLD AUTO: 0.6 K/UL — SIGNIFICANT CHANGE UP (ref 0–0.9)
MONOCYTES NFR BLD AUTO: 7 % — SIGNIFICANT CHANGE UP (ref 2–14)
NEUTROPHILS # BLD AUTO: 6.1 K/UL — SIGNIFICANT CHANGE UP (ref 1.8–7.4)
NEUTROPHILS NFR BLD AUTO: 76.6 % — SIGNIFICANT CHANGE UP (ref 43–77)
PLATELET # BLD AUTO: 248 K/UL — SIGNIFICANT CHANGE UP (ref 150–400)
RBC # BLD: 3.6 M/UL — LOW (ref 3.8–5.2)
RBC # FLD: 13.8 % — SIGNIFICANT CHANGE UP (ref 10.3–14.5)
WBC # BLD: 8 K/UL — SIGNIFICANT CHANGE UP (ref 3.8–10.5)
WBC # FLD AUTO: 8 K/UL — SIGNIFICANT CHANGE UP (ref 3.8–10.5)

## 2021-10-05 PROCEDURE — 99214 OFFICE O/P EST MOD 30 MIN: CPT

## 2021-10-05 NOTE — ASSESSMENT
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 \par -s/p 6 cycles of carbo-taxol followed by cytoreductive surgery, followed by 2 cycles of adjuvant carbo-taxol. C9 omitted given prolonged cytopenias.  She completed vaginal cuff brachytherapy on 8/6/18. \par -Developed recurrence in 7/2019 with left para-aortic and bilateral internal iliac lymph nodes, suspicious for recurrent/metastatic disease.  S/p 6 cycles of carbo/taxol retreat completed on 11/26/19 for platinum sensitive disease\par -[employer] testing (results 8/13/19) shows no reportable alterations w/  diagnostic claims.\par MS-Stable, TMB - low (5 Muts/Mb). \par -restaging scans from 5/6/21which show POD --> A subcentimeter LEFT paraceliac lymph node is equivocally enlarged (2/91). LEFT parailiac lymph node (6.5 mm max short axis, 2/108, coronal 601-50), equivocally enlarged.\par - At this time, her QOL is limited by dementia, but otherwise doing well.\par -no symptoms currently\par -no further plans for treatment given poor QOL due to dementia, no role for scans at this time. CT scans only if she has symptoms\par -daughter agrees to this plan\par -CBC reviewed. WBC 8, Hgb 11.8, platelets 248K\par \par \par 2)Vascular dementia - continue follow up with psychologist -  Adonis Ogden ([phone]): continue donepezil\par \par RTO 3 months

## 2021-10-05 NOTE — HISTORY OF PRESENT ILLNESS
[Disease: _____________________] : Disease: [unfilled] [M: ___] : M[unfilled] [AJCC Stage: ____] : AJCC Stage: [unfilled] [de-identified] : Ms. Haas is a 67 year old female with recently diagnosed endometrial cancer. She had an abnormal Pap smear on May 10, 2017, which showed HGSIL, positive HPV 16. On May 30, 2017, she underwent colposcopic examination. Cervical biopsies were performed with anterior cervical biopsy showing LGSIL and smaller fragments, suspicious for HGSIL; posterior cervical biopsy showing small focus of HGSIL. 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 This is secondary \par to a hypoenhancing mass in this region measuring 1.8 x 0.9 x 1.9 cm.The left ovary/left adnexal region is enlarged and heterogeneous in appearance measuring 4.1 x 3.6 x 2.6 cm. It is in close proximity to the sigmoid colon which demonstrates extensive diverticulosis and a focal fluid collection inseparable from the wall of the sigmoid colon adjacent to the left ovary. This focal fluid collection measures 2.3 x 1.8 cm and demonstrates prominent enhancement of the tissue surrounding it which is inseparable from enhancement adjacent to the left ovary. Patient is reportedly asymptomatic but these findings may represent subacute sigmoid diverticulitis with intramural/serosal sigmoid fluid collection/abscess which secondarily involves the left ovary. Neoplastic disease in this region is felt to be less likely.There is a 1.4 cm enhancing polypoid lesion inseparable from the cecum likely arising from the serosal surface which is suspicious for a tumor implant. Lymph nodes within pelvis are normal in size.  There is an abnormal appearing left inguinal lymph node measuring 2.4 x 2.1 cm.   \par \par 6/23/17 PET CT -  FDG avid left inguinal lymph node, 2.5 x 2.1 cm (SUV 10.6; image 210). FDG avid left para-aortic lymph node, 1.4 x 1.3 cm (SUV 10.9; image 158).\par 1.3 x 1.2 cm soft tissue nodule along the serosal surface of the cecum (SUV 13.0; image 209), as well as an additional nodule 1.2 x 1.1 cm (image 208; SUV 11.6). FDG avidity in the sigmoid colon in an area of diverticulosis SUV 12.2 (image 203).Focus of FDG avidity in the endometrial cavity (SUV 6.7; \par image 210) .\par \par s/p cytoreduction on 12/18/17.  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 Daughter present for visit, spoke on behalf of patient. Patient lives with daughter.\par Memory is not so good due to vascular dementia, has not progressed as per daughter. \par Denies N/V/D/C\par Denies pain\par No difficulty breathing. No cough \par No issues with walking. \par Able to feed self, dress self, bathe self with prompts from daughter\par \par 05/06/2021 CT CHEST IC & CT ABDOMEN AND PELVIS OC IC\par Findings:\par RETROPERITONEUM/LYMPH NODES: Left para-aortic lymph nodes (largest-1.9 x 1.5 cm, 2/97), not significantly changed from 12/8/2020. A subcentimeter LEFT paraceliac lymph node is equivocally enlarged (2/91). LEFT parailiac lymph node (6.5 mm max short axis, 2/108, coronal 601-50)), equivocally enlarged.\par IMPRESSION:\par 1.  Lower retroperitoneal /LEFT iliac lymph nodes, equivocally progressed from 12/8/2020.\par 2.  Stable RLL pulmonary nodule/density.\par 3.  Interval mild L3 superior endplate compression deformity\par \par 02/26/2021 PET/CT IMPRESSION:\par Since PET/CT December 21, 2020:\par 1.  Slightly increased FDG avidity of left para-aortic lymph node. Bilateral common iliac lymph nodes unchanged in size, with new minimal FDG avidity in the left and unchanged or slightly increased FDG avidity on the right.\par \par 2.  0.5 cm right middle lobe pulmonary nodule which is new since December 21, 2020, too small to characterize. Tree-in-bud opacities right upper lobe are probably infectious/inflammatory. 0.7 cm right lower lobe pulmonary nodule is unchanged. Consider follow-up with CT chest.\par \par \par

## 2021-10-05 NOTE — ADDENDUM
[FreeTextEntry1] : Documented by Gabriel Talley acting as scribe for Dr. Ochoa on 10/05/2021. \par \par All Medical record entries made by the Scribe were at my, Dr. Ochoa's, direction and personally dictated by me on 10/05/2021. I have reviewed the chart and agree that the record accurately reflects my personal performance of the history, physical exam, assessment and plan. I have also personally directed, reviewed, and agreed with the discharge instructions.

## 2021-10-05 NOTE — PHYSICAL EXAM
[Restricted in physically strenuous activity but ambulatory and able to carry out work of a light or sedentary nature] : Status 1- Restricted in physically strenuous activity but ambulatory and able to carry out work of a light or sedentary nature, e.g., light house work, office work [de-identified] : Interactive, well groomed, in NAD [de-identified] : No edema [de-identified] : soft, NT, ND

## 2021-10-18 LAB
ALBUMIN SERPL ELPH-MCNC: 4.5 G/DL
ALP BLD-CCNC: 93 U/L
ALT SERPL-CCNC: 16 U/L
ANION GAP SERPL CALC-SCNC: 14 MMOL/L
AST SERPL-CCNC: 14 U/L
BILIRUB SERPL-MCNC: 1 MG/DL
BUN SERPL-MCNC: 19 MG/DL
CALCIUM SERPL-MCNC: 9.6 MG/DL
CANCER AG125 SERPL-ACNC: 355 U/ML
CHLORIDE SERPL-SCNC: 103 MMOL/L
CO2 SERPL-SCNC: 26 MMOL/L
CREAT SERPL-MCNC: 1.08 MG/DL
GLUCOSE SERPL-MCNC: 148 MG/DL
POTASSIUM SERPL-SCNC: 3.8 MMOL/L
PROT SERPL-MCNC: 7 G/DL
SODIUM SERPL-SCNC: 142 MMOL/L

## 2021-12-29 ENCOUNTER — OUTPATIENT (OUTPATIENT)
Dept: OUTPATIENT SERVICES | Facility: HOSPITAL | Age: 71
LOS: 1 days | Discharge: HOME | End: 2021-12-29

## 2021-12-29 DIAGNOSIS — C55 MALIGNANT NEOPLASM OF UTERUS, PART UNSPECIFIED: ICD-10-CM

## 2022-01-04 ENCOUNTER — LABORATORY RESULT (OUTPATIENT)
Age: 72
End: 2022-01-04

## 2022-01-04 ENCOUNTER — RESULT REVIEW (OUTPATIENT)
Age: 72
End: 2022-01-04

## 2022-01-04 ENCOUNTER — APPOINTMENT (OUTPATIENT)
Dept: HEMATOLOGY ONCOLOGY | Facility: CLINIC | Age: 72
End: 2022-01-04
Payer: MEDICARE

## 2022-01-04 VITALS
HEART RATE: 95 BPM | HEIGHT: 64 IN | BODY MASS INDEX: 28.34 KG/M2 | DIASTOLIC BLOOD PRESSURE: 74 MMHG | SYSTOLIC BLOOD PRESSURE: 112 MMHG | OXYGEN SATURATION: 94 % | WEIGHT: 166 LBS

## 2022-01-04 DIAGNOSIS — D64.81 ANEMIA DUE TO ANTINEOPLASTIC CHEMOTHERAPY: ICD-10-CM

## 2022-01-04 DIAGNOSIS — E53.8 DEFICIENCY OF OTHER SPECIFIED B GROUP VITAMINS: ICD-10-CM

## 2022-01-04 DIAGNOSIS — D64.89 OTHER SPECIFIED ANEMIAS: ICD-10-CM

## 2022-01-04 LAB
BASOPHILS # BLD AUTO: 0.1 K/UL — SIGNIFICANT CHANGE UP (ref 0–0.2)
BASOPHILS NFR BLD AUTO: 0.9 % — SIGNIFICANT CHANGE UP (ref 0–2)
EOSINOPHIL # BLD AUTO: 0.2 K/UL — SIGNIFICANT CHANGE UP (ref 0–0.5)
EOSINOPHIL NFR BLD AUTO: 3.3 % — SIGNIFICANT CHANGE UP (ref 0–6)
HCT VFR BLD CALC: 32.2 % — LOW (ref 34.5–45)
HGB BLD-MCNC: 10.8 G/DL — LOW (ref 11.5–15.5)
LYMPHOCYTES # BLD AUTO: 0.9 K/UL — LOW (ref 1–3.3)
LYMPHOCYTES # BLD AUTO: 13.1 % — SIGNIFICANT CHANGE UP (ref 13–44)
MCHC RBC-ENTMCNC: 30.6 PG — SIGNIFICANT CHANGE UP (ref 27–34)
MCHC RBC-ENTMCNC: 33.6 G/DL — SIGNIFICANT CHANGE UP (ref 32–36)
MCV RBC AUTO: 91.3 FL — SIGNIFICANT CHANGE UP (ref 80–100)
MONOCYTES # BLD AUTO: 0.4 K/UL — SIGNIFICANT CHANGE UP (ref 0–0.9)
MONOCYTES NFR BLD AUTO: 6.1 % — SIGNIFICANT CHANGE UP (ref 2–14)
NEUTROPHILS # BLD AUTO: 5.4 K/UL — SIGNIFICANT CHANGE UP (ref 1.8–7.4)
NEUTROPHILS NFR BLD AUTO: 76.5 % — SIGNIFICANT CHANGE UP (ref 43–77)
PLATELET # BLD AUTO: 303 K/UL — SIGNIFICANT CHANGE UP (ref 150–400)
RBC # BLD: 3.53 M/UL — LOW (ref 3.8–5.2)
RBC # FLD: 15.3 % — HIGH (ref 10.3–14.5)
WBC # BLD: 7.1 K/UL — SIGNIFICANT CHANGE UP (ref 3.8–10.5)
WBC # FLD AUTO: 7.1 K/UL — SIGNIFICANT CHANGE UP (ref 3.8–10.5)

## 2022-01-04 PROCEDURE — 99214 OFFICE O/P EST MOD 30 MIN: CPT

## 2022-01-04 RX ORDER — DONEPEZIL HYDROCHLORIDE 10 MG/1
10 TABLET ORAL
Refills: 0 | Status: ACTIVE | COMMUNITY
Start: 2020-06-19

## 2022-01-04 NOTE — ASSESSMENT
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 \par -s/p 6 cycles of carbo-taxol followed by cytoreductive surgery, followed by 2 cycles of adjuvant carbo-taxol. C9 omitted given prolonged cytopenias.  She completed vaginal cuff brachytherapy on 8/6/18. \par -Developed recurrence in 7/2019 with left para-aortic and bilateral internal iliac lymph nodes, suspicious for recurrent/metastatic disease.  S/p 6 cycles of carbo/taxol retreat completed on 11/26/19 for platinum sensitive disease\par -[facility] testing (results 8/13/19) shows no reportable alterations w/  diagnostic claims.\par MS-Stable, TMB - low (5 Muts/Mb). \par -restaging scans from 5/6/21which show POD --> A subcentimeter LEFT paraceliac lymph node is equivocally enlarged (2/91). LEFT parailiac lymph node (6.5 mm max short axis, 2/108, coronal 601-50), equivocally enlarged.\par - At this time, her QOL is limited by dementia, but otherwise doing well.\par -no symptoms currently\par -no further plans for treatment given poor QOL due to dementia\par -CT C/A/P ordered for 4/2022\par -HGB 10.8 g- iron panel, B12 added\par -daughter agrees to this plan\par -CBC reviewed from today WBC 7.1 K HGB 10.8 g HCT 32.2 %  K \par \par 2)Vascular dementia - continue follow up with psychologist -  Adonis Ogden ([phone]): continue donepezil\par \par RTO 4 months

## 2022-01-04 NOTE — PHYSICAL EXAM
[Restricted in physically strenuous activity but ambulatory and able to carry out work of a light or sedentary nature] : Status 1- Restricted in physically strenuous activity but ambulatory and able to carry out work of a light or sedentary nature, e.g., light house work, office work [de-identified] : Interactive, well groomed, in NAD [de-identified] : No edema [de-identified] : soft, NT, ND

## 2022-01-04 NOTE — HISTORY OF PRESENT ILLNESS
[Disease: _____________________] : Disease: [unfilled] [M: ___] : M[unfilled] [AJCC Stage: ____] : AJCC Stage: [unfilled] [de-identified] : Ms. Haas is a 67 year old female with recently diagnosed endometrial cancer. She had an abnormal Pap smear on May 10, 2017, which showed HGSIL, positive HPV 16. On May 30, 2017, she underwent colposcopic examination. Cervical biopsies were performed with anterior cervical biopsy showing LGSIL and smaller fragments, suspicious for HGSIL; posterior cervical biopsy showing small focus of HGSIL. Endocervical curettage showed papillary serous carcinoma with psamomma body formation, and a small separate fragment of dysplastic squamous epithelium, faver high-grade squamous intraepithelial lesion (CORDELIA 2/moderate dysplasia).\par \par 85614 MRI pelvis - There is abnormal increased signal in the lower uterine segment endometrial canal also involving the upper portion of the cervical canal. This is secondary \par to a hypoenhancing mass in this region measuring 1.8 x 0.9 x 1.9 cm.The left ovary/left adnexal region is enlarged and heterogeneous in appearance measuring 4.1 x 3.6 x 2.6 cm. It is in close proximity to the sigmoid colon which demonstrates extensive diverticulosis and a focal fluid collection inseparable from the wall of the sigmoid colon adjacent to the left ovary. This focal fluid collection measures 2.3 x 1.8 cm and demonstrates prominent enhancement of the tissue surrounding it which is inseparable from enhancement adjacent to the left ovary. Patient is reportedly asymptomatic but these findings may represent subacute sigmoid diverticulitis with intramural/serosal sigmoid fluid collection/abscess which secondarily involves the left ovary. Neoplastic disease in this region is felt to be less likely.There is a 1.4 cm enhancing polypoid lesion inseparable from the cecum likely arising from the serosal surface which is suspicious for a tumor implant. Lymph nodes within pelvis are normal in size.  There is an abnormal appearing left inguinal lymph node measuring 2.4 x 2.1 cm.   \par \par 6/23/17 PET CT -  FDG avid left inguinal lymph node, 2.5 x 2.1 cm (SUV 10.6; image 210). FDG avid left para-aortic lymph node, 1.4 x 1.3 cm (SUV 10.9; image 158).\par 1.3 x 1.2 cm soft tissue nodule along the serosal surface of the cecum (SUV 13.0; image 209), as well as an additional nodule 1.2 x 1.1 cm (image 208; SUV 11.6). FDG avidity in the sigmoid colon in an area of diverticulosis SUV 12.2 (image 203).Focus of FDG avidity in the endometrial cavity (SUV 6.7; \par image 210) .\par \par s/p cytoreduction on 12/18/17.  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 Daughter present for visit, spoke on behalf of patient. Patient lives with daughter.\par Currently on oral iron supplement BID daily \par No pain \par No difficulty breathing\par No cough\par No abdominal bloating \par No difficulty having bowel movement \par Good appetite \par Memory impairment is the same, has not progressed \par Since 11/2021 spends majority of day in bed f/u with PCP who advised seasonal depression and increased Donepezil and Wellbutrin\par Daughter requesting scans to evaluate disease at this time

## 2022-01-05 LAB
ALBUMIN SERPL ELPH-MCNC: 4.3 G/DL
ALP BLD-CCNC: 84 U/L
ALT SERPL-CCNC: 12 U/L
ANION GAP SERPL CALC-SCNC: 14 MMOL/L
AST SERPL-CCNC: 15 U/L
BILIRUB SERPL-MCNC: 0.7 MG/DL
BUN SERPL-MCNC: 23 MG/DL
CALCIUM SERPL-MCNC: 9.7 MG/DL
CANCER AG125 SERPL-ACNC: 504 U/ML
CHLORIDE SERPL-SCNC: 102 MMOL/L
CO2 SERPL-SCNC: 28 MMOL/L
CREAT SERPL-MCNC: 1.18 MG/DL
GLUCOSE SERPL-MCNC: 130 MG/DL
POTASSIUM SERPL-SCNC: 3.8 MMOL/L
PROT SERPL-MCNC: 7.1 G/DL
SODIUM SERPL-SCNC: 143 MMOL/L

## 2022-03-30 ENCOUNTER — OUTPATIENT (OUTPATIENT)
Dept: OUTPATIENT SERVICES | Facility: HOSPITAL | Age: 72
LOS: 1 days | Discharge: HOME | End: 2022-03-30

## 2022-03-30 DIAGNOSIS — C55 MALIGNANT NEOPLASM OF UTERUS, PART UNSPECIFIED: ICD-10-CM

## 2022-04-22 ENCOUNTER — RESULT REVIEW (OUTPATIENT)
Age: 72
End: 2022-04-22

## 2022-04-22 ENCOUNTER — APPOINTMENT (OUTPATIENT)
Dept: CT IMAGING | Facility: CLINIC | Age: 72
End: 2022-04-22
Payer: MEDICARE

## 2022-04-22 PROCEDURE — G1004: CPT

## 2022-04-22 PROCEDURE — 82565 ASSAY OF CREATININE: CPT | Mod: QW

## 2022-04-22 PROCEDURE — 71260 CT THORAX DX C+: CPT | Mod: MG

## 2022-04-22 PROCEDURE — 74177 CT ABD & PELVIS W/CONTRAST: CPT | Mod: MG

## 2022-06-02 ENCOUNTER — OUTPATIENT (OUTPATIENT)
Dept: OUTPATIENT SERVICES | Facility: HOSPITAL | Age: 72
LOS: 1 days | Discharge: HOME | End: 2022-06-02

## 2022-06-02 DIAGNOSIS — C55 MALIGNANT NEOPLASM OF UTERUS, PART UNSPECIFIED: ICD-10-CM

## 2022-06-07 ENCOUNTER — RESULT REVIEW (OUTPATIENT)
Age: 72
End: 2022-06-07

## 2022-06-07 ENCOUNTER — APPOINTMENT (OUTPATIENT)
Dept: HEMATOLOGY ONCOLOGY | Facility: CLINIC | Age: 72
End: 2022-06-07
Payer: MEDICARE

## 2022-06-07 VITALS
BODY MASS INDEX: 26.8 KG/M2 | HEIGHT: 64 IN | OXYGEN SATURATION: 94 % | DIASTOLIC BLOOD PRESSURE: 72 MMHG | WEIGHT: 157 LBS | HEART RATE: 111 BPM | SYSTOLIC BLOOD PRESSURE: 126 MMHG

## 2022-06-07 LAB
BASOPHILS # BLD AUTO: 0.1 K/UL — SIGNIFICANT CHANGE UP (ref 0–0.2)
BASOPHILS NFR BLD AUTO: 0.9 % — SIGNIFICANT CHANGE UP (ref 0–2)
EOSINOPHIL # BLD AUTO: 0.1 K/UL — SIGNIFICANT CHANGE UP (ref 0–0.5)
EOSINOPHIL NFR BLD AUTO: 0.9 % — SIGNIFICANT CHANGE UP (ref 0–6)
HCT VFR BLD CALC: 28.2 % — LOW (ref 34.5–45)
HGB BLD-MCNC: 9.2 G/DL — LOW (ref 11.5–15.5)
LYMPHOCYTES # BLD AUTO: 0.8 K/UL — LOW (ref 1–3.3)
LYMPHOCYTES # BLD AUTO: 6.6 % — LOW (ref 13–44)
MCHC RBC-ENTMCNC: 26.1 PG — LOW (ref 27–34)
MCHC RBC-ENTMCNC: 32.7 G/DL — SIGNIFICANT CHANGE UP (ref 32–36)
MCV RBC AUTO: 79.9 FL — LOW (ref 80–100)
MONOCYTES # BLD AUTO: 0.7 K/UL — SIGNIFICANT CHANGE UP (ref 0–0.9)
MONOCYTES NFR BLD AUTO: 6.3 % — SIGNIFICANT CHANGE UP (ref 2–14)
NEUTROPHILS # BLD AUTO: 9.8 K/UL — HIGH (ref 1.8–7.4)
NEUTROPHILS NFR BLD AUTO: 85.4 % — HIGH (ref 43–77)
PLATELET # BLD AUTO: 486 K/UL — HIGH (ref 150–400)
RBC # BLD: 3.53 M/UL — LOW (ref 3.8–5.2)
RBC # FLD: 16.5 % — HIGH (ref 10.3–14.5)
WBC # BLD: 11.5 K/UL — HIGH (ref 3.8–10.5)
WBC # FLD AUTO: 11.5 K/UL — HIGH (ref 3.8–10.5)

## 2022-06-07 PROCEDURE — 99214 OFFICE O/P EST MOD 30 MIN: CPT

## 2022-06-07 NOTE — HISTORY OF PRESENT ILLNESS
[Disease: _____________________] : Disease: [unfilled] [M: ___] : M[unfilled] [AJCC Stage: ____] : AJCC Stage: [unfilled] [de-identified] : Ms. Haas is a 67 year old female with recently diagnosed endometrial cancer. She had an abnormal Pap smear on May 10, 2017, which showed HGSIL, positive HPV 16. On May 30, 2017, she underwent colposcopic examination. Cervical biopsies were performed with anterior cervical biopsy showing LGSIL and smaller fragments, suspicious for HGSIL; posterior cervical biopsy showing small focus of HGSIL. Endocervical curettage showed papillary serous carcinoma with psamomma body formation, and a small separate fragment of dysplastic squamous epithelium, faver high-grade squamous intraepithelial lesion (CORDELIA 2/moderate dysplasia).\par \par 01406 MRI pelvis - There is abnormal increased signal in the lower uterine segment endometrial canal also involving the upper portion of the cervical canal. This is secondary \par to a hypoenhancing mass in this region measuring 1.8 x 0.9 x 1.9 cm.The left ovary/left adnexal region is enlarged and heterogeneous in appearance measuring 4.1 x 3.6 x 2.6 cm. It is in close proximity to the sigmoid colon which demonstrates extensive diverticulosis and a focal fluid collection inseparable from the wall of the sigmoid colon adjacent to the left ovary. This focal fluid collection measures 2.3 x 1.8 cm and demonstrates prominent enhancement of the tissue surrounding it which is inseparable from enhancement adjacent to the left ovary. Patient is reportedly asymptomatic but these findings may represent subacute sigmoid diverticulitis with intramural/serosal sigmoid fluid collection/abscess which secondarily involves the left ovary. Neoplastic disease in this region is felt to be less likely.There is a 1.4 cm enhancing polypoid lesion inseparable from the cecum likely arising from the serosal surface which is suspicious for a tumor implant. Lymph nodes within pelvis are normal in size.  There is an abnormal appearing left inguinal lymph node measuring 2.4 x 2.1 cm.   \par \par 6/23/17 PET CT -  FDG avid left inguinal lymph node, 2.5 x 2.1 cm (SUV 10.6; image 210). FDG avid left para-aortic lymph node, 1.4 x 1.3 cm (SUV 10.9; image 158).\par 1.3 x 1.2 cm soft tissue nodule along the serosal surface of the cecum (SUV 13.0; image 209), as well as an additional nodule 1.2 x 1.1 cm (image 208; SUV 11.6). FDG avidity in the sigmoid colon in an area of diverticulosis SUV 12.2 (image 203).Focus of FDG avidity in the endometrial cavity (SUV 6.7; \par image 210) .\par \par s/p cytoreduction on 12/18/17.  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 Daughter present for visit, spoke on behalf of patient. Patient lives with daughter. Patient did not participate in conversation.\par Now with 4 kg Weight loss since last viist. \par No pain \par No dysuria \par No blood in stool/urine \par Memory impairment is the same, has not progressed \par Daughter works full time and she checks in on her 1-2x per day. Does not require aid for now. \par \par

## 2022-06-07 NOTE — ADDENDUM
[FreeTextEntry1] : Documented by Gabriel Talley acting as scribe for Dr. Ochoa on 06/07/2022. \par \par All Medical record entries made by the Scribe were at my, Dr. Ochoa's, direction and personally dictated by me on 06/07/2022. I have reviewed the chart and agree that the record accurately reflects my personal performance of the history, physical exam, assessment and plan. I have also personally directed, reviewed, and agreed with the discharge instructions.

## 2022-06-07 NOTE — ASSESSMENT
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 \par -s/p 6 cycles of carbo-taxol followed by cytoreductive surgery, followed by 2 cycles of adjuvant carbo-taxol. C9 omitted given prolonged cytopenias.  She completed vaginal cuff brachytherapy on 8/6/18. \par -Developed recurrence in 7/2019 with left para-aortic and bilateral internal iliac lymph nodes, suspicious for recurrent/metastatic disease.  S/p 6 cycles of carbo/taxol retreat completed on 11/26/19 for platinum sensitive disease\par -[employer] testing (results 8/13/19) shows no reportable alterations w/  diagnostic claims.\par MS-Stable, TMB - low (5 Muts/Mb). \par -restaging scans from 5/6/21which show POD --> A subcentimeter LEFT paraceliac lymph node is equivocally enlarged (2/91). LEFT parailiac lymph node (6.5 mm max short axis, 2/108, coronal 601-50), equivocally enlarged.\par -reviewed : 4/22/22 CT CAP:  Interval development of left pelvic, retroperitoneal and retrocrural lymphadenopathy and progression of mesenteric lymphadenopathy in comparison to the prior study.\par Development of moderately severe left-sided hydroureteronephrosis secondary to left common iliac lymphadenopathy.Stable right lower lobe nodule. New pleural-based opacity dependent right lung base as described above.\par \par - At this time, her QOL is limited by dementia, but otherwise doing well. Discussed that hydrouteronephrosis can cause kidney issues which may require intervention. \par -no symptoms currently\par -no further plans for treatment given poor QOL due to dementia. Discussed with daughter and son want a conservative approach and are not interested in further cancer therapy. Discussed role of  hospice. \par -CBC reviewed from today WBC 11.5 K HGB 9.2 g HCT 28.2%  K \par \par 2)Vascular dementia - continue follow up with psychologist -  Adonis Ogden ([phone]): continue donepezil\par \par Tentative follow up in RTO 3 months

## 2022-06-07 NOTE — PHYSICAL EXAM
[Restricted in physically strenuous activity but ambulatory and able to carry out work of a light or sedentary nature] : Status 1- Restricted in physically strenuous activity but ambulatory and able to carry out work of a light or sedentary nature, e.g., light house work, office work [de-identified] : Interactive, well groomed, in NAD [de-identified] : clear [de-identified] : S1/S2 [de-identified] : No edema [de-identified] : soft, NT, ND

## 2022-06-09 ENCOUNTER — NON-APPOINTMENT (OUTPATIENT)
Age: 72
End: 2022-06-09

## 2022-06-09 DIAGNOSIS — R41.3 OTHER AMNESIA: ICD-10-CM

## 2022-06-09 DIAGNOSIS — C55 MALIGNANT NEOPLASM OF UTERUS, PART UNSPECIFIED: ICD-10-CM

## 2022-06-09 DIAGNOSIS — R68.89 OTHER GENERAL SYMPTOMS AND SIGNS: ICD-10-CM

## 2022-06-09 LAB
CANCER AG125 SERPL-ACNC: 1121 U/ML
FERRITIN SERPL-MCNC: 92 NG/ML
FOLATE SERPL-MCNC: >20 NG/ML
IRON SATN MFR SERPL: 7 %
IRON SERPL-MCNC: 20 UG/DL
TIBC SERPL-MCNC: 297 UG/DL
UIBC SERPL-MCNC: 277 UG/DL
VIT B12 SERPL-MCNC: 382 PG/ML

## 2022-06-10 LAB
ALBUMIN SERPL ELPH-MCNC: 4.5 G/DL
ALP BLD-CCNC: 95 U/L
ALT SERPL-CCNC: 12 U/L
ANION GAP SERPL CALC-SCNC: 19 MMOL/L
AST SERPL-CCNC: 17 U/L
BILIRUB SERPL-MCNC: 0.7 MG/DL
BUN SERPL-MCNC: 28 MG/DL
CALCIUM SERPL-MCNC: 10.1 MG/DL
CHLORIDE SERPL-SCNC: 98 MMOL/L
CO2 SERPL-SCNC: 22 MMOL/L
CREAT SERPL-MCNC: 1.65 MG/DL
EGFR: 33 ML/MIN/1.73M2
GLUCOSE SERPL-MCNC: 128 MG/DL
POTASSIUM SERPL-SCNC: 4.5 MMOL/L
PROT SERPL-MCNC: 7.5 G/DL
SODIUM SERPL-SCNC: 139 MMOL/L

## 2022-09-14 ENCOUNTER — OUTPATIENT (OUTPATIENT)
Dept: OUTPATIENT SERVICES | Facility: HOSPITAL | Age: 72
LOS: 1 days | Discharge: HOME | End: 2022-09-14

## 2022-09-14 DIAGNOSIS — C54.1 MALIGNANT NEOPLASM OF ENDOMETRIUM: ICD-10-CM

## 2022-09-14 DIAGNOSIS — C55 MALIGNANT NEOPLASM OF UTERUS, PART UNSPECIFIED: ICD-10-CM

## 2022-09-20 ENCOUNTER — APPOINTMENT (OUTPATIENT)
Dept: HEMATOLOGY ONCOLOGY | Facility: CLINIC | Age: 72
End: 2022-09-20

## 2022-09-20 DIAGNOSIS — C54.1 MALIGNANT NEOPLASM OF ENDOMETRIUM: ICD-10-CM

## 2022-09-20 PROCEDURE — 99214 OFFICE O/P EST MOD 30 MIN: CPT | Mod: 95

## 2022-09-20 NOTE — ADDENDUM
[FreeTextEntry1] : Documented by Ann Croft acting as scribe for Dr. Ochoa on 09/20/2022.\par \par All Medical record entries made by the Scribe were at my, Dr. Ochoa, direction and personally dictated by me on 09/20/2022. I have reviewed the chart and agree that the record accurately reflects my personal performance of the history, physical exam, assessment and plan. I have also personally directed, reviewed, and agreed with the discharge instructions.

## 2022-09-20 NOTE — HISTORY OF PRESENT ILLNESS
[Disease: _____________________] : Disease: [unfilled] [M: ___] : M[unfilled] [AJCC Stage: ____] : AJCC Stage: [unfilled] [Home] : at home, [unfilled] , at the time of the visit. [Medical Office: ([facility])___] : at the medical office located in  [Family Member] : family member [de-identified] : Ms. Haas is a 67 year old female with recently diagnosed endometrial cancer. She had an abnormal Pap smear on May 10, 2017, which showed HGSIL, positive HPV 16. On May 30, 2017, she underwent colposcopic examination. Cervical biopsies were performed with anterior cervical biopsy showing LGSIL and smaller fragments, suspicious for HGSIL; posterior cervical biopsy showing small focus of HGSIL. Endocervical curettage showed papillary serous carcinoma with psamomma body formation, and a small separate fragment of dysplastic squamous epithelium, faver high-grade squamous intraepithelial lesion (CORDELIA 2/moderate dysplasia).\par \par 28450 MRI pelvis - There is abnormal increased signal in the lower uterine segment endometrial canal also involving the upper portion of the cervical canal. This is secondary \par to a hypoenhancing mass in this region measuring 1.8 x 0.9 x 1.9 cm.The left ovary/left adnexal region is enlarged and heterogeneous in appearance measuring 4.1 x 3.6 x 2.6 cm. It is in close proximity to the sigmoid colon which demonstrates extensive diverticulosis and a focal fluid collection inseparable from the wall of the sigmoid colon adjacent to the left ovary. This focal fluid collection measures 2.3 x 1.8 cm and demonstrates prominent enhancement of the tissue surrounding it which is inseparable from enhancement adjacent to the left ovary. Patient is reportedly asymptomatic but these findings may represent subacute sigmoid diverticulitis with intramural/serosal sigmoid fluid collection/abscess which secondarily involves the left ovary. Neoplastic disease in this region is felt to be less likely.There is a 1.4 cm enhancing polypoid lesion inseparable from the cecum likely arising from the serosal surface which is suspicious for a tumor implant. Lymph nodes within pelvis are normal in size.  There is an abnormal appearing left inguinal lymph node measuring 2.4 x 2.1 cm.   \par \par 6/23/17 PET CT -  FDG avid left inguinal lymph node, 2.5 x 2.1 cm (SUV 10.6; image 210). FDG avid left para-aortic lymph node, 1.4 x 1.3 cm (SUV 10.9; image 158).\par 1.3 x 1.2 cm soft tissue nodule along the serosal surface of the cecum (SUV 13.0; image 209), as well as an additional nodule 1.2 x 1.1 cm (image 208; SUV 11.6). FDG avidity in the sigmoid colon in an area of diverticulosis SUV 12.2 (image 203).Focus of FDG avidity in the endometrial cavity (SUV 6.7; \par image 210) .\par \par s/p cytoreduction on 12/18/17.  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 \par MMR proficient.\par  [de-identified] : papillary serous carcinoma with psamomma body formation [FreeTextEntry3] : Mira, daughter [de-identified] : Returns for follow up. Daughter Mira present for visit, spoke with daughter and patient. Patient lives with daughter. \par Reports that pt started hospice 3 months ago\par Pt is now home and eats comfort foods, reports eating \par Reports complaints of L leg edema and does not elevated leg, starting to become discolored/ mild redness \par Reports one episode of complaints of face swelling that was warm to the touch \par Reports visiting nurse once/week\par Reports some urinary and fecal incontinence\par Reports being ambulatory \par Denies pain

## 2022-09-20 NOTE — ASSESSMENT
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 \par -s/p 6 cycles of carbo-taxol followed by cytoreductive surgery, followed by 2 cycles of adjuvant carbo-taxol. C9 omitted given prolonged cytopenias.  She completed vaginal cuff brachytherapy on 8/6/18. \par -Developed recurrence in 7/2019 with left para-aortic and bilateral internal iliac lymph nodes, suspicious for recurrent/metastatic disease.  S/p 6 cycles of carbo/taxol retreat completed on 11/26/19 for platinum sensitive disease\par -[employer] testing (results 8/13/19) shows no reportable alterations w/  diagnostic claims.\par MS-Stable, TMB - low (5 Muts/Mb). \par -restaging scans from 5/6/21which show POD --> A subcentimeter LEFT paraceliac lymph node is equivocally enlarged (2/91). LEFT parailiac lymph node (6.5 mm max short axis, 2/108, coronal 601-50), equivocally enlarged.\par -reviewed : 4/22/22 CT CAP:  Interval development of left pelvic, retroperitoneal and retrocrural lymphadenopathy and progression of mesenteric lymphadenopathy in comparison to the prior study.\par Development of moderately severe left-sided hydroureteronephrosis secondary to left common iliac lymphadenopathy.Stable right lower lobe nodule. New pleural-based opacity dependent right lung base as described above.\par --no further plans for treatment given poor QOL due to dementia. \par \par -9/20/22- pt is currently in hospice at home x 3months with weekly visiting nurse services, pt is ambulatory, eating, and moving bowels, however pt is experiencing urinary and fecal incontinence and worsening L leg edema which may be ?DVT vs worsening LAD in abd\par -patient to remain on hospice.\par \par Follow up PRN

## 2022-09-20 NOTE — END OF VISIT
Oxtellar XR approved non formulary exemption through 12/31/18     Patient ID [id number]    [Time Spent: ___ minutes] : I have spent [unfilled] minutes of time on the encounter. [>50% of the face to face encounter time was spent on counseling and/or coordination of care for ___] : Greater than 50% of the face to face encounter time was spent on counseling and/or coordination of care for [unfilled]

## 2022-09-20 NOTE — PHYSICAL EXAM
[Restricted in physically strenuous activity but ambulatory and able to carry out work of a light or sedentary nature] : Status 1- Restricted in physically strenuous activity but ambulatory and able to carry out work of a light or sedentary nature, e.g., light house work, office work [de-identified] : Interactive, well groomed, in NAD [de-identified] : Left leg edema

## 2023-07-03 NOTE — H&P PST ADULT - DOES THIS PATIENT HAVE A HISTORY OF OR HAS BEEN DX WITH HEART FAILURE?
PT admitted for hyperglycemia treated with episodic hypoglycemia called for stroke code with decreased responsiveness with spontaneous resolution currently back to baseline.  Suspect syncope vs hypoglycemia less likely seizure.  Recommendations as above.
no